# Patient Record
Sex: FEMALE | Race: WHITE | Employment: FULL TIME | URBAN - METROPOLITAN AREA
[De-identification: names, ages, dates, MRNs, and addresses within clinical notes are randomized per-mention and may not be internally consistent; named-entity substitution may affect disease eponyms.]

---

## 2022-04-11 ENCOUNTER — HOSPITAL ENCOUNTER (OUTPATIENT)
Age: 62
Setting detail: OBSERVATION
Discharge: HOME | End: 2022-04-12
Attending: EMERGENCY MEDICINE
Payer: COMMERCIAL

## 2022-04-11 ENCOUNTER — APPOINTMENT (OUTPATIENT)
Dept: CT IMAGING | Age: 62
End: 2022-04-11
Payer: COMMERCIAL

## 2022-04-11 DIAGNOSIS — K43.9 ABDOMINAL WALL HERNIA: ICD-10-CM

## 2022-04-11 DIAGNOSIS — R19.7 NAUSEA VOMITING AND DIARRHEA: Primary | ICD-10-CM

## 2022-04-11 DIAGNOSIS — R11.2 NAUSEA VOMITING AND DIARRHEA: Primary | ICD-10-CM

## 2022-04-11 DIAGNOSIS — E87.6 HYPOKALEMIA: ICD-10-CM

## 2022-04-11 LAB
-: ABNORMAL
ABSOLUTE EOS #: 0 K/UL (ref 0–0.4)
ABSOLUTE LYMPH #: 3.3 K/UL (ref 1–4.8)
ABSOLUTE MONO #: 1 K/UL (ref 0.1–1.2)
ALBUMIN SERPL-MCNC: 3.1 G/DL (ref 3.5–5.2)
ALBUMIN/GLOBULIN RATIO: 0.8 (ref 1–2.5)
ALP BLD-CCNC: 138 U/L (ref 35–104)
ALT SERPL-CCNC: 41 U/L (ref 5–33)
ANION GAP SERPL CALCULATED.3IONS-SCNC: 10 MMOL/L (ref 9–17)
AST SERPL-CCNC: 59 U/L
BACTERIA: ABNORMAL
BASOPHILS # BLD: 1 % (ref 0–2)
BASOPHILS ABSOLUTE: 0.1 K/UL (ref 0–0.2)
BILIRUB SERPL-MCNC: 1.62 MG/DL (ref 0.3–1.2)
BILIRUBIN DIRECT: 0.48 MG/DL
BILIRUBIN URINE: ABNORMAL
BILIRUBIN, INDIRECT: 1.14 MG/DL (ref 0–1)
BUN BLDV-MCNC: 14 MG/DL (ref 8–23)
CALCIUM SERPL-MCNC: 7.6 MG/DL (ref 8.6–10.4)
CASTS UA: ABNORMAL /LPF
CASTS UA: ABNORMAL /LPF
CHLORIDE BLD-SCNC: 90 MMOL/L (ref 98–107)
CO2: 30 MMOL/L (ref 20–31)
COLOR: ABNORMAL
CREAT SERPL-MCNC: 0.87 MG/DL (ref 0.5–0.9)
EOSINOPHILS RELATIVE PERCENT: 1 % (ref 1–4)
EPITHELIAL CELLS UA: ABNORMAL /HPF (ref 0–5)
GFR AFRICAN AMERICAN: >60 ML/MIN
GFR NON-AFRICAN AMERICAN: >60 ML/MIN
GFR SERPL CREATININE-BSD FRML MDRD: ABNORMAL ML/MIN/{1.73_M2}
GLUCOSE BLD-MCNC: 119 MG/DL (ref 70–99)
GLUCOSE URINE: NEGATIVE
HCT VFR BLD CALC: 50.5 % (ref 36–46)
HEMOGLOBIN: 16.9 G/DL (ref 12–16)
KETONES, URINE: ABNORMAL
LACTIC ACID: 2.4 MMOL/L (ref 0.5–2.2)
LEUKOCYTE ESTERASE, URINE: NEGATIVE
LIPASE: 20 U/L (ref 13–60)
LYMPHOCYTES # BLD: 42 % (ref 24–44)
MCH RBC QN AUTO: 30.7 PG (ref 26–34)
MCHC RBC AUTO-ENTMCNC: 33.5 G/DL (ref 31–37)
MCV RBC AUTO: 91.6 FL (ref 80–100)
MONOCYTES # BLD: 13 % (ref 2–11)
NITRITE, URINE: POSITIVE
OTHER OBSERVATIONS UA: ABNORMAL
PDW BLD-RTO: 16.4 % (ref 12.5–15.4)
PH UA: 6 (ref 5–8)
PLATELET # BLD: 215 K/UL (ref 140–450)
PMV BLD AUTO: 8 FL (ref 6–12)
POTASSIUM SERPL-SCNC: 3 MMOL/L (ref 3.7–5.3)
PROTEIN UA: ABNORMAL
RBC # BLD: 5.51 M/UL (ref 4–5.2)
RBC UA: ABNORMAL /HPF (ref 0–2)
SARS-COV-2, RAPID: NOT DETECTED
SEG NEUTROPHILS: 43 % (ref 36–66)
SEGMENTED NEUTROPHILS ABSOLUTE COUNT: 3.3 K/UL (ref 1.8–7.7)
SODIUM BLD-SCNC: 130 MMOL/L (ref 135–144)
SPECIFIC GRAVITY UA: 1.03 (ref 1–1.03)
SPECIMEN DESCRIPTION: NORMAL
TOTAL PROTEIN: 6.9 G/DL (ref 6.4–8.3)
TROPONIN, HIGH SENSITIVITY: 6 NG/L (ref 0–14)
TURBIDITY: CLEAR
URINE HGB: NEGATIVE
UROBILINOGEN, URINE: NORMAL
WBC # BLD: 7.8 K/UL (ref 3.5–11)
WBC UA: ABNORMAL /HPF (ref 0–5)

## 2022-04-11 PROCEDURE — 2580000003 HC RX 258: Performed by: EMERGENCY MEDICINE

## 2022-04-11 PROCEDURE — 83605 ASSAY OF LACTIC ACID: CPT

## 2022-04-11 PROCEDURE — G0378 HOSPITAL OBSERVATION PER HR: HCPCS

## 2022-04-11 PROCEDURE — 6370000000 HC RX 637 (ALT 250 FOR IP): Performed by: STUDENT IN AN ORGANIZED HEALTH CARE EDUCATION/TRAINING PROGRAM

## 2022-04-11 PROCEDURE — 6360000002 HC RX W HCPCS: Performed by: STUDENT IN AN ORGANIZED HEALTH CARE EDUCATION/TRAINING PROGRAM

## 2022-04-11 PROCEDURE — 93005 ELECTROCARDIOGRAM TRACING: CPT | Performed by: STUDENT IN AN ORGANIZED HEALTH CARE EDUCATION/TRAINING PROGRAM

## 2022-04-11 PROCEDURE — 6360000004 HC RX CONTRAST MEDICATION: Performed by: EMERGENCY MEDICINE

## 2022-04-11 PROCEDURE — 83690 ASSAY OF LIPASE: CPT

## 2022-04-11 PROCEDURE — 81001 URINALYSIS AUTO W/SCOPE: CPT

## 2022-04-11 PROCEDURE — 87635 SARS-COV-2 COVID-19 AMP PRB: CPT

## 2022-04-11 PROCEDURE — 96374 THER/PROPH/DIAG INJ IV PUSH: CPT

## 2022-04-11 PROCEDURE — 74177 CT ABD & PELVIS W/CONTRAST: CPT

## 2022-04-11 PROCEDURE — 85025 COMPLETE CBC W/AUTO DIFF WBC: CPT

## 2022-04-11 PROCEDURE — 96372 THER/PROPH/DIAG INJ SC/IM: CPT

## 2022-04-11 PROCEDURE — 99219 PR INITIAL OBSERVATION CARE/DAY 50 MINUTES: CPT | Performed by: STUDENT IN AN ORGANIZED HEALTH CARE EDUCATION/TRAINING PROGRAM

## 2022-04-11 PROCEDURE — 6370000000 HC RX 637 (ALT 250 FOR IP): Performed by: EMERGENCY MEDICINE

## 2022-04-11 PROCEDURE — 96365 THER/PROPH/DIAG IV INF INIT: CPT

## 2022-04-11 PROCEDURE — 96375 TX/PRO/DX INJ NEW DRUG ADDON: CPT

## 2022-04-11 PROCEDURE — 6360000002 HC RX W HCPCS: Performed by: EMERGENCY MEDICINE

## 2022-04-11 PROCEDURE — 84484 ASSAY OF TROPONIN QUANT: CPT

## 2022-04-11 PROCEDURE — 2580000003 HC RX 258: Performed by: STUDENT IN AN ORGANIZED HEALTH CARE EDUCATION/TRAINING PROGRAM

## 2022-04-11 PROCEDURE — 80076 HEPATIC FUNCTION PANEL: CPT

## 2022-04-11 PROCEDURE — 99284 EMERGENCY DEPT VISIT MOD MDM: CPT

## 2022-04-11 PROCEDURE — 80048 BASIC METABOLIC PNL TOTAL CA: CPT

## 2022-04-11 PROCEDURE — 36415 COLL VENOUS BLD VENIPUNCTURE: CPT

## 2022-04-11 RX ORDER — HYDROCHLOROTHIAZIDE 12.5 MG/1
TABLET ORAL
COMMUNITY
Start: 2022-01-14

## 2022-04-11 RX ORDER — CITALOPRAM 20 MG/1
10 TABLET ORAL DAILY
Status: DISCONTINUED | OUTPATIENT
Start: 2022-04-11 | End: 2022-04-12 | Stop reason: HOSPADM

## 2022-04-11 RX ORDER — 0.9 % SODIUM CHLORIDE 0.9 %
1000 INTRAVENOUS SOLUTION INTRAVENOUS ONCE
Status: COMPLETED | OUTPATIENT
Start: 2022-04-11 | End: 2022-04-11

## 2022-04-11 RX ORDER — ACETAMINOPHEN 650 MG/1
650 SUPPOSITORY RECTAL EVERY 6 HOURS PRN
Status: DISCONTINUED | OUTPATIENT
Start: 2022-04-11 | End: 2022-04-12 | Stop reason: HOSPADM

## 2022-04-11 RX ORDER — LISINOPRIL 10 MG/1
40 TABLET ORAL
COMMUNITY
Start: 2022-02-14

## 2022-04-11 RX ORDER — ONDANSETRON 4 MG/1
4 TABLET, FILM COATED ORAL EVERY 8 HOURS PRN
Qty: 20 TABLET | Refills: 0 | Status: SHIPPED | OUTPATIENT
Start: 2022-04-11 | End: 2022-05-09 | Stop reason: ALTCHOICE

## 2022-04-11 RX ORDER — ONDANSETRON 4 MG/1
4 TABLET, ORALLY DISINTEGRATING ORAL EVERY 8 HOURS PRN
Status: DISCONTINUED | OUTPATIENT
Start: 2022-04-11 | End: 2022-04-12 | Stop reason: HOSPADM

## 2022-04-11 RX ORDER — SODIUM CHLORIDE 0.9 % (FLUSH) 0.9 %
5-40 SYRINGE (ML) INJECTION EVERY 12 HOURS SCHEDULED
Status: DISCONTINUED | OUTPATIENT
Start: 2022-04-11 | End: 2022-04-12 | Stop reason: HOSPADM

## 2022-04-11 RX ORDER — 0.9 % SODIUM CHLORIDE 0.9 %
80 INTRAVENOUS SOLUTION INTRAVENOUS ONCE
Status: DISCONTINUED | OUTPATIENT
Start: 2022-04-11 | End: 2022-04-12 | Stop reason: HOSPADM

## 2022-04-11 RX ORDER — SODIUM CHLORIDE 9 MG/ML
INJECTION, SOLUTION INTRAVENOUS PRN
Status: DISCONTINUED | OUTPATIENT
Start: 2022-04-11 | End: 2022-04-12 | Stop reason: HOSPADM

## 2022-04-11 RX ORDER — SODIUM CHLORIDE 0.9 % (FLUSH) 0.9 %
5-40 SYRINGE (ML) INJECTION PRN
Status: DISCONTINUED | OUTPATIENT
Start: 2022-04-11 | End: 2022-04-12 | Stop reason: HOSPADM

## 2022-04-11 RX ORDER — LISINOPRIL 10 MG/1
10 TABLET ORAL DAILY
Status: DISCONTINUED | OUTPATIENT
Start: 2022-04-11 | End: 2022-04-12 | Stop reason: HOSPADM

## 2022-04-11 RX ORDER — SODIUM CHLORIDE 9 MG/ML
INJECTION, SOLUTION INTRAVENOUS CONTINUOUS
Status: DISCONTINUED | OUTPATIENT
Start: 2022-04-11 | End: 2022-04-12 | Stop reason: HOSPADM

## 2022-04-11 RX ORDER — ONDANSETRON 2 MG/ML
4 INJECTION INTRAMUSCULAR; INTRAVENOUS ONCE
Status: COMPLETED | OUTPATIENT
Start: 2022-04-11 | End: 2022-04-11

## 2022-04-11 RX ORDER — POLYETHYLENE GLYCOL 3350 17 G/17G
17 POWDER, FOR SOLUTION ORAL DAILY PRN
Status: DISCONTINUED | OUTPATIENT
Start: 2022-04-11 | End: 2022-04-12 | Stop reason: HOSPADM

## 2022-04-11 RX ORDER — CITALOPRAM 10 MG/1
TABLET ORAL
COMMUNITY
Start: 2021-09-14

## 2022-04-11 RX ORDER — LOSARTAN POTASSIUM 50 MG/1
50 TABLET ORAL DAILY
Status: ON HOLD | COMMUNITY
End: 2022-04-11

## 2022-04-11 RX ORDER — CEPHALEXIN 500 MG/1
500 CAPSULE ORAL 3 TIMES DAILY
Qty: 21 CAPSULE | Refills: 0 | Status: SHIPPED | OUTPATIENT
Start: 2022-04-11 | End: 2022-04-18

## 2022-04-11 RX ORDER — POTASSIUM CHLORIDE 20 MEQ/1
40 TABLET, EXTENDED RELEASE ORAL ONCE
Status: COMPLETED | OUTPATIENT
Start: 2022-04-11 | End: 2022-04-11

## 2022-04-11 RX ORDER — SODIUM CHLORIDE 0.9 % (FLUSH) 0.9 %
10 SYRINGE (ML) INJECTION PRN
Status: DISCONTINUED | OUTPATIENT
Start: 2022-04-11 | End: 2022-04-12 | Stop reason: HOSPADM

## 2022-04-11 RX ORDER — ACETAMINOPHEN 325 MG/1
650 TABLET ORAL EVERY 6 HOURS PRN
Status: DISCONTINUED | OUTPATIENT
Start: 2022-04-11 | End: 2022-04-12 | Stop reason: HOSPADM

## 2022-04-11 RX ORDER — HYDROCHLOROTHIAZIDE 25 MG/1
12.5 TABLET ORAL DAILY
Status: DISCONTINUED | OUTPATIENT
Start: 2022-04-11 | End: 2022-04-12 | Stop reason: HOSPADM

## 2022-04-11 RX ORDER — ONDANSETRON 2 MG/ML
4 INJECTION INTRAMUSCULAR; INTRAVENOUS EVERY 6 HOURS PRN
Status: DISCONTINUED | OUTPATIENT
Start: 2022-04-11 | End: 2022-04-12 | Stop reason: HOSPADM

## 2022-04-11 RX ADMIN — SODIUM CHLORIDE: 9 INJECTION, SOLUTION INTRAVENOUS at 11:20

## 2022-04-11 RX ADMIN — CITALOPRAM HYDROBROMIDE 10 MG: 20 TABLET, FILM COATED ORAL at 11:17

## 2022-04-11 RX ADMIN — POTASSIUM CHLORIDE 40 MEQ: 20 TABLET, EXTENDED RELEASE ORAL at 09:40

## 2022-04-11 RX ADMIN — HYDROCHLOROTHIAZIDE 12.5 MG: 25 TABLET ORAL at 11:17

## 2022-04-11 RX ADMIN — CEFTRIAXONE SODIUM 1000 MG: 1 INJECTION, POWDER, FOR SOLUTION INTRAMUSCULAR; INTRAVENOUS at 09:44

## 2022-04-11 RX ADMIN — IOPAMIDOL 100 ML: 755 INJECTION, SOLUTION INTRAVENOUS at 08:24

## 2022-04-11 RX ADMIN — LISINOPRIL 10 MG: 10 TABLET ORAL at 11:18

## 2022-04-11 RX ADMIN — SODIUM CHLORIDE, PRESERVATIVE FREE 10 ML: 5 INJECTION INTRAVENOUS at 11:21

## 2022-04-11 RX ADMIN — ONDANSETRON 4 MG: 2 INJECTION INTRAMUSCULAR; INTRAVENOUS at 07:45

## 2022-04-11 RX ADMIN — Medication 80 ML: at 08:26

## 2022-04-11 RX ADMIN — ACETAMINOPHEN 650 MG: 325 TABLET ORAL at 11:17

## 2022-04-11 RX ADMIN — ENOXAPARIN SODIUM 30 MG: 100 INJECTION SUBCUTANEOUS at 20:34

## 2022-04-11 RX ADMIN — ACETAMINOPHEN 650 MG: 325 TABLET ORAL at 20:31

## 2022-04-11 RX ADMIN — SODIUM CHLORIDE 1000 ML: 9 INJECTION, SOLUTION INTRAVENOUS at 07:44

## 2022-04-11 RX ADMIN — SODIUM CHLORIDE, PRESERVATIVE FREE 10 ML: 5 INJECTION INTRAVENOUS at 08:25

## 2022-04-11 RX ADMIN — SODIUM CHLORIDE, PRESERVATIVE FREE 10 ML: 5 INJECTION INTRAVENOUS at 22:52

## 2022-04-11 ASSESSMENT — PAIN DESCRIPTION - LOCATION
LOCATION: ABDOMEN

## 2022-04-11 ASSESSMENT — PAIN DESCRIPTION - ONSET: ONSET: ON-GOING

## 2022-04-11 ASSESSMENT — PAIN SCALES - GENERAL
PAINLEVEL_OUTOF10: 0
PAINLEVEL_OUTOF10: 3
PAINLEVEL_OUTOF10: 5
PAINLEVEL_OUTOF10: 7
PAINLEVEL_OUTOF10: 3
PAINLEVEL_OUTOF10: 7

## 2022-04-11 ASSESSMENT — ENCOUNTER SYMPTOMS
DIARRHEA: 1
ABDOMINAL PAIN: 1
VOMITING: 1
NAUSEA: 1

## 2022-04-11 ASSESSMENT — PAIN DESCRIPTION - PAIN TYPE
TYPE: ACUTE PAIN

## 2022-04-11 ASSESSMENT — PAIN - FUNCTIONAL ASSESSMENT
PAIN_FUNCTIONAL_ASSESSMENT: ACTIVITIES ARE NOT PREVENTED
PAIN_FUNCTIONAL_ASSESSMENT: 0-10

## 2022-04-11 ASSESSMENT — PAIN DESCRIPTION - DESCRIPTORS
DESCRIPTORS: DISCOMFORT
DESCRIPTORS: DISCOMFORT
DESCRIPTORS: ACHING;DISCOMFORT

## 2022-04-11 ASSESSMENT — PAIN DESCRIPTION - FREQUENCY: FREQUENCY: INTERMITTENT

## 2022-04-11 ASSESSMENT — PAIN DESCRIPTION - PROGRESSION: CLINICAL_PROGRESSION: NOT CHANGED

## 2022-04-11 NOTE — H&P
[facility]  Office: [address], DO, Sherrie Garza, DO, Mary Ellen Lewis, DO, Fransisco Peralta Blood, DO, Emilie Sheth MD, Shira Luis MD, Michael Reyes MD, Jerad Beck MD, Pushpa Mccarthy MD, Hernando Ceballos MD, Romana Ramos MD, Winston Serra, DO, Trung Collado, DO, Juliane Ascencio MD,  Bruce Schroeder, DO, Berto Heredia MD, Eri Gale MD, Hope Opitz, MD, Mercedes Morales, DO, Harshil Pierre MD, Tenzin Matamoros MD, Abby Abdalla, [facility], [facility], CNP, Anneliese Abraham, CNP, Dennis Benavidez, CNP, James Garcia, CNP, Marianna Warren, CNP, Nette Negron PARebeccaC, Lindsey Colon, [facility], Demetrio Birmingham, CNP, Meche Lucas, CNP, Juanis Abel, CNP, Gilmar Coles, CNS, Katarzyna Cha, [facility], Vianey Parry, CNP, Laura Connolly, CNP, Royal Pickering, [address]    HISTORY AND PHYSICAL EXAMINATION            Date:   4/11/2022  Patient name:  Bhavna Cuadra  Date of admission:  4/11/2022  7:08 AM  MRN:   [id number]  Account:  [de-identified]  YOB: 1960  PCP:    CONCHIS Vargas CNP  Room:   ER11/ER11  Code Status:    No Order    Chief Complaint:     Chief Complaint   Patient presents with    Fever     since wednesday, denies sick contacts    Nausea & Vomiting    Diarrhea    Abdominal Pain     discomfort, hx of diverticulitis     History Obtained From:     patient    History of Present Illness:     Bhavna Cuadra is a 62 y.o. female with a past medical history of depression, hypertension and hyperlipidemia who presented to the emergency department on 4/11/2022 complaining of intractable diarrhea, nausea/vomiting and abdominal pain. The patient states that her symptoms began last Wednesday and have remained stable. She states that she has had very little to eat or drink since then due to persistent nausea. In the ED, the patient was afebrile and nontoxic appearing. Lipase was within normal limits.  Hepatic function panel was remarkable for a mild transaminitis (ALT 41, AST 59). CT scan of the abdomen and pelvis was done and was negative for an acute intraabdominal finding. The patient is admitted to internal medicine for further management of intractable nausea, vomiting and diarrhea likely secondary to gastroenteritis. Past Medical History:     Past Medical History:   Diagnosis Date    Anemia 10/23/2013    History of depression     History of spontaneous      Hyperlipidemia     Hypertension     Vision abnormalities         Past Surgical History:     Past Surgical History:   Procedure Laterality Date    CHOLECYSTECTOMY      COLONOSCOPY      [facility]    HERNIA REPAIR Right 1987        Medications Prior to Admission:     Prior to Admission medications    Medication Sig Start Date End Date Taking? Authorizing Provider   citalopram (CELEXA) 10 MG tablet TAKE 1 TABLET BY MOUTH EVERY DAY 21  Yes Historical Provider, MD   lisinopril (PRINIVIL;ZESTRIL) 10 MG tablet TAKE 1 TABLET BY MOUTH EVERY DAY 22  Yes Historical Provider, MD   hydroCHLOROthiazide (HYDRODIURIL) 12.5 MG tablet TAKE 1 TABLET BY MOUTH EVERY DAY 22  Yes Historical Provider, MD   ondansetron (ZOFRAN) 4 MG tablet Take 1 tablet by mouth every 8 hours as needed for Nausea 22  Yes Katherin Santiago MD   cephALEXin (KEFLEX) 500 MG capsule Take 1 capsule by mouth 3 times daily for 7 days 22 Yes Katherin Santiago MD   losartan-hydrochlorothiazide [facility]) 50-12.5 MG per tablet TAKE 1 TABLET DAILY 16   Liane Wheatley MD        Allergies:     Codeine    Social History:     Tobacco:    reports that she has never smoked. She has never used smokeless tobacco.  Alcohol:      reports current alcohol use. Drug Use:  reports no history of drug use.     Family History:     Family History   Problem Relation Age of Onset    Heart Disease Father     Hypertension Mother     Heart Disease Mother     Breast Cancer Neg Hx     Cancer Neg Hx     Colon Cancer Neg Hx     Diabetes Neg Hx     Eclampsia Neg Hx     Ovarian Cancer Neg Hx      Labor Neg Hx     Spont Abortions Neg Hx     Stroke Neg Hx        Review of Systems:     Positive and Negative as described in HPI. CONSTITUTIONAL: Positive for fever and chills. HEENT:  negative for vision, hearing changes, runny nose, throat pain  RESPIRATORY:  negative for shortness of breath, cough, congestion, wheezing  CARDIOVASCULAR:  negative for chest pain, palpitations  GASTROINTESTINAL: Positive for abdominal pain, nausea/vomiting and diarrhea. GENITOURINARY:  negative for difficulty of urination, burning with urination, frequency   INTEGUMENT:  negative for rash, skin lesions, easy bruising   HEMATOLOGIC/LYMPHATIC:  negative for swelling/edema   ALLERGIC/IMMUNOLOGIC:  negative for urticaria , itching  ENDOCRINE:  negative increase in drinking, increase in urination, hot or cold intolerance  MUSCULOSKELETAL:  negative joint pains, muscle aches, swelling of joints  NEUROLOGICAL:  negative for headaches, dizziness, lightheadedness, numbness, pain, tingling extremities  BEHAVIOR/PSYCH:  negative for depression, anxiety    Physical Exam:   BP (!) 152/78   Pulse 97   Temp 98 °F (36.7 °C) (Oral)   Resp 14   Ht 5' 4\" (1.626 m)   Wt 250 lb (113.4 kg)   LMP 10/01/2013   SpO2 94%   BMI 42.91 kg/m²   Temp (24hrs), Av °F (36.7 °C), Min:98 °F (36.7 °C), Max:98 °F (36.7 °C)    No results for input(s): POCGLU in the last 72 hours.   No intake or output data in the 24 hours ending 22 1001    General Appearance:  alert, well appearing, and in no acute distress  Mental status: oriented to person, place, and time  Head:  normocephalic, atraumatic  Eye: no icterus, redness, pupils equal and reactive, extraocular eye movements intact, conjunctiva clear  Ear: normal external ear, no discharge, hearing intact  Nose:  no drainage noted  Mouth: mucous membranes moist  Neck: supple, no carotid bruits, thyroid not palpable  Lungs: Bilateral equal air entry, clear to ausculation, no wheezing, rales or rhonchi, normal effort  Cardiovascular: normal rate, regular rhythm, no murmur, gallop, rub  Abdomen: Diffuse tenderness to palpation appreciated.    Neurologic: There are no new focal motor or sensory deficits, normal muscle tone and bulk, no abnormal sensation, normal speech, cranial nerves II through XII grossly intact  Skin: No gross lesions, rashes, bruising or bleeding on exposed skin area  Extremities:  peripheral pulses palpable, no pedal edema or calf pain with palpation  Psych: normal affect     Investigations:      Laboratory Testing:  Recent Results (from the past 24 hour(s))   Basic Metabolic Panel    Collection Time: 04/11/22  7:45 AM   Result Value Ref Range    Glucose 119 (H) 70 - 99 mg/dL    BUN 14 8 - 23 mg/dL    CREATININE 0.87 0.50 - 0.90 mg/dL    Calcium 7.6 (L) 8.6 - 10.4 mg/dL    Sodium 130 (L) 135 - 144 mmol/L    Potassium 3.0 (L) 3.7 - 5.3 mmol/L    Chloride 90 (L) 98 - 107 mmol/L    CO2 30 20 - 31 mmol/L    Anion Gap 10 9 - 17 mmol/L    GFR Non-African American >60 >60 mL/min    GFR African American >60 >60 mL/min    GFR Comment         CBC with Auto Differential    Collection Time: 04/11/22  7:45 AM   Result Value Ref Range    WBC 7.8 3.5 - 11.0 k/uL    RBC 5.51 (H) 4.0 - 5.2 m/uL    Hemoglobin 16.9 (H) 12.0 - 16.0 g/dL    Hematocrit 50.5 (H) 36 - 46 %    MCV 91.6 80 - 100 fL    MCH 30.7 26 - 34 pg    MCHC 33.5 31 - 37 g/dL    RDW 16.4 (H) 12.5 - 15.4 %    Platelets 280 093 - 190 k/uL    MPV 8.0 6.0 - 12.0 fL    Seg Neutrophils 43 36 - 66 %    Lymphocytes 42 24 - 44 %    Monocytes 13 (H) 2 - 11 %    Eosinophils % 1 1 - 4 %    Basophils 1 0 - 2 %    Segs Absolute 3.30 1.8 - 7.7 k/uL    Absolute Lymph # 3.30 1.0 - 4.8 k/uL    Absolute Mono # 1.00 0.1 - 1.2 k/uL    Absolute Eos # 0.00 0.0 - 0.4 k/uL    Basophils Absolute 0.10 0.0 - 0.2 k/uL   Lactic Acid    Collection Time: 04/11/22  7:45 AM   Result Value Ref Range    Lactic Acid 2.4 (H) 0.5 - 2.2 mmol/L   Hepatic Function Panel    Collection Time: 04/11/22  7:45 AM   Result Value Ref Range    Albumin 3.1 (L) 3.5 - 5.2 g/dL    Alkaline Phosphatase 138 (H) 35 - 104 U/L    ALT 41 (H) 5 - 33 U/L    AST 59 (H) <32 U/L    Total Bilirubin 1.62 (H) 0.3 - 1.2 mg/dL    Bilirubin, Direct 0.48 (H) <0.31 mg/dL    Bilirubin, Indirect 1.14 (H) 0.00 - 1.00 mg/dL    Total Protein 6.9 6.4 - 8.3 g/dL    Albumin/Globulin Ratio 0.8 (L) 1.0 - 2.5   Lipase    Collection Time: 04/11/22  7:45 AM   Result Value Ref Range    Lipase 20 13 - 60 U/L   Troponin    Collection Time: 04/11/22  7:45 AM   Result Value Ref Range    Troponin, High Sensitivity 6 0 - 14 ng/L   Urinalysis with Reflex to Culture    Collection Time: 04/11/22  8:19 AM   Result Value Ref Range    Color, UA Dark Yellow (A) Yellow    Turbidity UA Clear Clear    Glucose, Ur NEGATIVE NEGATIVE    Bilirubin Urine NEGATIVE  Verified by ictotest. (A) NEGATIVE    Ketones, Urine TRACE (A) NEGATIVE    Specific Gravity, UA 1.034 (H) 1.005 - 1.030    Urine Hgb NEGATIVE NEGATIVE    pH, UA 6.0 5.0 - 8.0    Protein, UA 1+ (A) NEGATIVE    Urobilinogen, Urine Normal Normal    Nitrite, Urine POSITIVE (A) NEGATIVE    Leukocyte Esterase, Urine NEGATIVE NEGATIVE   Microscopic Urinalysis    Collection Time: 04/11/22  8:19 AM   Result Value Ref Range    -          WBC, UA 2 TO 5 0 - 5 /HPF    RBC, UA 0 TO 2 0 - 2 /HPF    Casts UA 0 TO 2 /LPF    Casts UA HYALINE /LPF    Epithelial Cells UA 2 TO 5 0 - 5 /HPF    Bacteria, UA FEW (A) None    Other Observations UA (A) NOT REQ. Utilizing a urinalysis as the only screening method to exclude a potential uropathogen can be unreliable in many patient populations. Rapid screening tests are less sensitive than culture and if UTI is a clinical possibility, culture should be considered despite a negative urinalysis.    COVID-19, Rapid    Collection Time: 04/11/22  9:30 AM Specimen: Nasopharyngeal Swab   Result Value Ref Range    Specimen Description . NASOPHARYNGEAL SWAB     SARS-CoV-2, Rapid Not Detected Not Detected       Imaging/Diagnostics:  CT ABDOMEN PELVIS W IV CONTRAST Additional Contrast? None    Result Date: 4/11/2022  Diastasis of the abdominal structures muscle with fat containing midline hernias throughout a majority of the span of the abdomen. A hernia inferiorly contains fat and small bowel loops. Uncomplicated colonic diverticulosis. Assessment :      Hospital Problems           Last Modified POA    * (Principal) Intractable vomiting with nausea 4/11/2022 Yes    Hypertension 4/11/2022 Yes    History of depression 4/11/2022 Yes          Plan:     Patient status observation in the Med/Surge    Intractable nausea and vomiting   -Likely secondary to viral gastroenteritis   -GI pathogen panel pending   -Maintenance fluids at 125 mL/hr   -Diet clear liquid   -Daily CBC   -Daily BMP   -PRN ondansetron   -Anticipate discharge home within the next 24-hours if the patient improves     Hypertension   -Continue home lisinopril 10 mg daily   -Continue home hydrochlorothiazide    Depression   -Continue home citalopram 10 mg daily      Consultations:   None    Patient is admitted as inpatient status because of co-morbidities listed above, severity of signs and symptoms as outlined, requirement for current medical therapies and most importantly because of direct risk to patient if care not provided in a hospital setting. Expected length of stay > 48 hours.     Lupe Rodriguez MD  4/11/2022  10:01 AM    Copy sent to CONCHIS Carlos - CNP

## 2022-04-11 NOTE — LETTER
[address] [facility] ICU  [address] [facility] [address]  Phone: [phone]        April 12, 2022     Patient: Terese Olszewski   YOB: 1960   Date of Visit: 4/11/2022       To Whom It May Concern: It is my medical opinion that Diane Hooker may return to work Friday 4/15/2022. If you have any questions or concerns, please don't hesitate to call.     Sincerely,        Dr Willard Pearson

## 2022-04-11 NOTE — PROGRESS NOTES
[facility]up[facility] [address] [employer]  Occupational Therapy Not Seen Note    DATE: 2022    NAME: Rafael Baker  MRN: [id number]   : 1960      Patient not seen this date for Occupational Therapy due to:      Patient independent with ADLs and functional tasks with no acute OT needs. Pt demo ability to independently don socks while seated EOB and perform functional mobility within hospital room/hallway independently. Pt reports no safety concerns for returning home at discharge or with performing ADL tasks at this time; pt states only \"feeling tired\" due to lack of tolerating nutrients for ~1 week. Will defer OT evaluation at this time. Please reorder OT if future needs arise. Pt educated in activity promotion while hospitalized and verbalized understanding.          Electronically signed by Josiane Zhang OT on 2022 at 11:50 AM

## 2022-04-11 NOTE — PROGRESS NOTES
Physical Therapy         Physical Therapy Cancel Note      DATE: 2022    NAME: Jackie Oliver  MRN: [id number]   : 1960      Patient not seen this date for Physical Therapy due to:    Patient independent with functional mobility. Will defer PT evaluation at this time. Please reorder PT if future needs arise. Pt ambulated without device Independently without LOB or difficulty. No PT needs at this time.       Electronically signed by Butch Peoples PT on 2022 at 11:50 AM

## 2022-04-11 NOTE — PROGRESS NOTES
Pharmacist Review and Automatic Dose Adjustment of Prophylactic Enoxaparin      The reviewing pharmacist has made an adjustment to the ordered enoxaparin dose or converted to UFH per the approved [facility] protocol and table as identified below. Gautam Meyers is a 62 y.o. female. Recent Labs     04/11/22  0745   CREATININE 0.87       Estimated Creatinine Clearance: 85 mL/min (based on SCr of 0.87 mg/dL). Height:   Ht Readings from Last 1 Encounters:   04/11/22 5' 4\" (1.626 m)     Weight:  Wt Readings from Last 1 Encounters:   04/11/22 250 lb (113.4 kg)               Plan: Based upon the patient's weight and renal function, the enoxaparin dose has been changed/converted to Lovenox 30 mg BID.       Thank you,  Geoffrey Diallo [id number], [facility]  4/11/2022, 10:38 AM

## 2022-04-11 NOTE — ED PROVIDER NOTES
[id number] [facility] ED  [address] [facility] [address]  Phone: [phone]  Fax: [phone]        Pt Name: Yoel Henson  MRN: [id number]  [place] 1960  Date of evaluation: 22      CHIEF COMPLAINT     Chief Complaint   Patient presents with    Fever     since wednesday, denies sick contacts    Nausea & Vomiting    Diarrhea    Abdominal Pain     discomfort, hx of diverticulitis         HISTORY OF PRESENT ILLNESS  (Location/Symptom, Timing/Onset, Context/Setting, Quality, Duration, Modifying Factors, Severity.)    Yoel Henson is a 62 y.o. female who presents with fever chills nausea vomiting diarrhea. The patient dates on Wednesday she started developing nausea vomiting diarrhea fever chills some abdominal cramping she had one episode where she thought there might be some blood in her stool she does have a history of diverticular disease denies any bad food exposure nobody around her with similar symptoms no chest pain no shortness of breath she is able to eat small amounts of food but then will have either vomiting or diarrhea if she eats too much      REVIEW OF SYSTEMS    (2-9 systems for level 4, 10 or more for level 5)     Review of Systems   Constitutional: Positive for chills and fever. Gastrointestinal: Positive for abdominal pain, diarrhea, nausea and vomiting. All other systems reviewed and are negative. PAST MEDICAL HISTORY    has a past medical history of Anemia, History of depression, History of spontaneous , Hyperlipidemia, Hypertension, and Vision abnormalities. SURGICAL HISTORY      has a past surgical history that includes Cholecystectomy (); hernia repair (Right, ); and Colonoscopy.     CURRENTMEDICATIONS       Previous Medications    CITALOPRAM (CELEXA) 10 MG TABLET    TAKE 1 TABLET BY MOUTH EVERY DAY    HYDROCHLOROTHIAZIDE (HYDRODIURIL) 12.5 MG TABLET    TAKE 1 TABLET BY MOUTH EVERY DAY    LISINOPRIL (PRINIVIL;ZESTRIL) 10 MG TABLET    TAKE 1 TABLET BY MOUTH EVERY DAY    LOSARTAN-HYDROCHLOROTHIAZIDE (HYZAAR) 50-12.5 MG PER TABLET    TAKE 1 TABLET DAILY       ALLERGIES     is allergic to codeine. FAMILY HISTORY     She indicated that her mother is alive. She indicated that her father is . She indicated that the status of her neg hx is unknown.     family history includes Heart Disease in her father and mother; Hypertension in her mother. SOCIAL HISTORY      reports that she has never smoked. She has never used smokeless tobacco. She reports current alcohol use. She reports that she does not use drugs. PHYSICAL EXAM    (up to 7 for level 4, 8 or more for level 5)   INITIAL VITALS:  height is 5' 4\" (1.626 m) and weight is 113.4 kg (250 lb). Her oral temperature is 98 °F (36.7 °C). Her blood pressure is 152/78 (abnormal) and her pulse is 97. Her respiration is 14 and oxygen saturation is 94%. Physical Exam  Vitals and nursing note reviewed. Constitutional:       Appearance: Normal appearance. HENT:      Head: Normocephalic and atraumatic. Eyes:      Conjunctiva/sclera: Conjunctivae normal.   Cardiovascular:      Rate and Rhythm: Regular rhythm. Tachycardia present. Pulmonary:      Effort: Pulmonary effort is normal.      Breath sounds: Normal breath sounds. Abdominal:      General: Bowel sounds are normal. There is no distension. Palpations: Abdomen is soft. There is no mass. Tenderness: There is abdominal tenderness. There is no right CVA tenderness, left CVA tenderness, guarding or rebound. Comments: Mild tenderness in the periumbilical region no other abdominal tenderness no overt peritoneal findings   Musculoskeletal:         General: Normal range of motion. Cervical back: Normal range of motion and neck supple. Skin:     General: Skin is warm and dry. Neurological:      General: No focal deficit present. Mental Status: She is alert. DIFFERENTIAL DIAGNOSIS/ MDM:     We will establish an IV give the patient IV fluids check labs UA EKG and a CT of the abdomen and pelvis    DIAGNOSTIC RESULTS     EKG: All EKG's are interpreted by the Emergency Department Physician who either signs or Co-signs this chart in the absence of a cardiologist.      Interpreted by Clovis Nogueira MD     Rhythm: normal sinus   Rate: 99  Axis: -18  Ectopy: none  Conduction: normal  ST Segments: no acute change  T Waves: no acute change  Q Waves: none    Clinical Impression: normal sinus rhythm with no acute changes/normal EKG. No acute infarction/ischemia noted. RADIOLOGY:        Interpretation per the Radiologist below, if available at the time of this note:    CT ABDOMEN PELVIS W IV CONTRAST Additional Contrast? None (Final result)  Result time 04/11/22 09:08:38  Final result by Ephriam Severe, MD (04/11/22 09:08:38)                Impression:    Diastasis of the abdominal structures muscle with fat containing midline   hernias throughout a majority of the span of the abdomen.  A hernia   inferiorly contains fat and small bowel loops. Uncomplicated colonic diverticulosis. Narrative:    EXAMINATION:   CT OF THE ABDOMEN AND PELVIS WITH CONTRAST 4/11/2022 8:22 am     TECHNIQUE:   CT of the abdomen and pelvis was performed with the administration of   intravenous contrast. Multiplanar reformatted images are provided for review. Dose modulation, iterative reconstruction, and/or weight based adjustment of   the mA/kV was utilized to reduce the radiation dose to as low as reasonably   achievable. COMPARISON:   CT abdomen and pelvis performed 07/26/2016.      HISTORY:   ORDERING SYSTEM PROVIDED HISTORY: pain   TECHNOLOGIST PROVIDED HISTORY:     pain   Decision Support Exception - unselect if not a suspected or confirmed   emergency medical condition->Emergency Medical Condition (MA)   Reason for Exam: Fever; Nausea & Vomiting; Diarrhea; Abdominal Pain     FINDINGS:   Lower Chest: The lung bases are without consolidation or effusion.  The   visualized cardiac structures are unremarkable. Organs: The liver and spleen are normal size and overall attenuation.  There   are splenic granulomas.  The gallbladder has been removed.  Pancreas and   adrenal glands are unremarkable. Harvey Patee are without obstructive uropathy. The ureters are not dilated.  The urinary bladder is contracted. GI/Bowel: The stomach is contracted and otherwise unremarkable.  Loops of   small bowel are normal in caliber without evidence for obstruction.  The   colon contains air and fecal residue. Jackie Cheeks are uncomplicated diverticula. There is no free air or free fluid. Pelvis: The uterus is age-appropriate. Peritoneum/Retroperitoneum: The psoas muscles are symmetric.  The abdominal   aorta is normal in caliber.  The inferior vena cava is unremarkable. Jackie Cheeks   is no retroperitoneal or mesenteric adenopathy. Bones/Soft Tissues: There is diastasis of the abdominus rectus muscle with   herniation of intra-abdominal fat in the midline throughout a majority of the   abdomen.  There is a midline hernia inferiorly containing abdominal fat and   small bowel loops.  There is no acute osseous abnormality.                    LABS:  Results for orders placed or performed during the hospital encounter of 35/97/03   Basic Metabolic Panel   Result Value Ref Range    Glucose 119 (H) 70 - 99 mg/dL    BUN 14 8 - 23 mg/dL    CREATININE 0.87 0.50 - 0.90 mg/dL    Calcium 7.6 (L) 8.6 - 10.4 mg/dL    Sodium 130 (L) 135 - 144 mmol/L    Potassium 3.0 (L) 3.7 - 5.3 mmol/L    Chloride 90 (L) 98 - 107 mmol/L    CO2 30 20 - 31 mmol/L    Anion Gap 10 9 - 17 mmol/L    GFR Non-African American >60 >60 mL/min    GFR African American >60 >60 mL/min    GFR Comment         CBC with Auto Differential   Result Value Ref Range    WBC 7.8 3.5 - 11.0 k/uL    RBC 5.51 (H) 4.0 - 5.2 m/uL    Hemoglobin 16.9 (H) 12.0 - 16.0 g/dL    Hematocrit 50.5 (H) 36 - 46 %    MCV 91.6 80 - 100 fL    MCH 30.7 26 - 34 pg    MCHC 33.5 31 - 37 g/dL    RDW 16.4 (H) 12.5 - 15.4 %    Platelets 933 506 - 407 k/uL    MPV 8.0 6.0 - 12.0 fL    Seg Neutrophils 43 36 - 66 %    Lymphocytes 42 24 - 44 %    Monocytes 13 (H) 2 - 11 %    Eosinophils % 1 1 - 4 %    Basophils 1 0 - 2 %    Segs Absolute 3.30 1.8 - 7.7 k/uL    Absolute Lymph # 3.30 1.0 - 4.8 k/uL    Absolute Mono # 1.00 0.1 - 1.2 k/uL    Absolute Eos # 0.00 0.0 - 0.4 k/uL    Basophils Absolute 0.10 0.0 - 0.2 k/uL   Lactic Acid   Result Value Ref Range    Lactic Acid 2.4 (H) 0.5 - 2.2 mmol/L   Hepatic Function Panel   Result Value Ref Range    Albumin 3.1 (L) 3.5 - 5.2 g/dL    Alkaline Phosphatase 138 (H) 35 - 104 U/L    ALT 41 (H) 5 - 33 U/L    AST 59 (H) <32 U/L    Total Bilirubin 1.62 (H) 0.3 - 1.2 mg/dL    Bilirubin, Direct 0.48 (H) <0.31 mg/dL    Bilirubin, Indirect 1.14 (H) 0.00 - 1.00 mg/dL    Total Protein 6.9 6.4 - 8.3 g/dL    Albumin/Globulin Ratio 0.8 (L) 1.0 - 2.5   Lipase   Result Value Ref Range    Lipase 20 13 - 60 U/L   Troponin   Result Value Ref Range    Troponin, High Sensitivity 6 0 - 14 ng/L   Urinalysis with Reflex to Culture   Result Value Ref Range    Color, UA Dark Yellow (A) Yellow    Turbidity UA Clear Clear    Glucose, Ur NEGATIVE NEGATIVE    Bilirubin Urine NEGATIVE  Verified by ictotest. (A) NEGATIVE    Ketones, Urine TRACE (A) NEGATIVE    Specific Gravity, UA 1.034 (H) 1.005 - 1.030    Urine Hgb NEGATIVE NEGATIVE    pH, UA 6.0 5.0 - 8.0    Protein, UA 1+ (A) NEGATIVE    Urobilinogen, Urine Normal Normal    Nitrite, Urine POSITIVE (A) NEGATIVE    Leukocyte Esterase, Urine NEGATIVE NEGATIVE   Microscopic Urinalysis   Result Value Ref Range    -          WBC, UA 2 TO 5 0 - 5 /HPF    RBC, UA 0 TO 2 0 - 2 /HPF    Casts UA 0 TO 2 /LPF    Casts UA HYALINE /LPF    Epithelial Cells UA 2 TO 5 0 - 5 /HPF    Bacteria, UA FEW (A) None    Other Observations UA (A) NOT REQ. Utilizing a urinalysis as the only screening method to exclude a potential uropathogen can be unreliable in many patient populations. Rapid screening tests are less sensitive than culture and if UTI is a clinical possibility, culture should be considered despite a negative urinalysis. EMERGENCY DEPARTMENT COURSE:   Vitals:    Vitals:    04/11/22 0715 04/11/22 0915   BP: (!) 161/95 (!) 152/78   Pulse: 111 97   Resp: 16 14   Temp: 98 °F (36.7 °C)    TempSrc: Oral    SpO2: 95% 94%   Weight: 113.4 kg (250 lb)    Height: 5' 4\" (1.626 m)      -------------------------  BP: (!) 152/78, Temp: 98 °F (36.7 °C), Pulse: 97, Resp: 14      RE-EVALUATION:  On repeat evaluation the patient is feeling better after IV fluids I did did review her labs and CT report and offer the patient admission to hospital setting giving her multiple hernias nausea vomiting diarrhea initially the patient refused however after discussing this with her  the patient states now she would prefer to be admitted as she has not been able to eat and drink much and feels as though additional IV fluids would help get her over her symptoms quicker so I will contact my hospitalist for admission    Consult:  Dr. Vladimir Quintana is kind enough to omit the patient to his service      PROCEDURES:  None    FINAL IMPRESSION      1. Nausea vomiting and diarrhea    2. Hypokalemia    3.  Abdominal wall hernia          DISPOSITION/PLAN   DISPOSITION        CONDITION ON DISPOSITION:   Improved - Stable    PATIENT REFERRED TO:  CONCHIS Alvarez - CNP  [address] [id number]  [phone]    Call in 1 day        DISCHARGE MEDICATIONS:  New Prescriptions    CEPHALEXIN (KEFLEX) 500 MG CAPSULE    Take 1 capsule by mouth 3 times daily for 7 days    ONDANSETRON (ZOFRAN) 4 MG TABLET    Take 1 tablet by mouth every 8 hours as needed for Nausea       (Please note that portions of this note were completed with a voicerecognition program.  Efforts were made to edit the dictations but occasionally words are mis-transcribed.)    Debby Rojas MD,, MD, F.A.C.E.P.   Attending Emergency Medicine Physician       Debby Rojas MD  04/11/22 1999

## 2022-04-12 VITALS
OXYGEN SATURATION: 93 % | BODY MASS INDEX: 48.55 KG/M2 | WEIGHT: 284.39 LBS | HEART RATE: 90 BPM | TEMPERATURE: 98.3 F | SYSTOLIC BLOOD PRESSURE: 133 MMHG | HEIGHT: 64 IN | DIASTOLIC BLOOD PRESSURE: 68 MMHG | RESPIRATION RATE: 16 BRPM

## 2022-04-12 PROBLEM — R19.7 NAUSEA VOMITING AND DIARRHEA: Status: ACTIVE | Noted: 2022-04-11

## 2022-04-12 LAB
ABSOLUTE EOS #: 0.05 K/UL (ref 0–0.4)
ABSOLUTE LYMPH #: 0.99 K/UL (ref 1–4.8)
ABSOLUTE MONO #: 0.41 K/UL (ref 0.1–0.8)
ALBUMIN SERPL-MCNC: 2.4 G/DL (ref 3.5–5.2)
ALBUMIN/GLOBULIN RATIO: 0.8 (ref 1–2.5)
ALP BLD-CCNC: 102 U/L (ref 35–104)
ALT SERPL-CCNC: 35 U/L (ref 5–33)
ANION GAP SERPL CALCULATED.3IONS-SCNC: 6 MMOL/L (ref 9–17)
AST SERPL-CCNC: 49 U/L
ATYPICAL LYMPHOCYTE ABSOLUTE COUNT: 0.23 K/UL
ATYPICAL LYMPHOCYTES: 5 %
BASOPHILS # BLD: 1 % (ref 0–2)
BASOPHILS ABSOLUTE: 0.05 K/UL (ref 0–0.2)
BILIRUB SERPL-MCNC: 1.05 MG/DL (ref 0.3–1.2)
BUN BLDV-MCNC: 9 MG/DL (ref 8–23)
CALCIUM SERPL-MCNC: 7.1 MG/DL (ref 8.6–10.4)
CHLORIDE BLD-SCNC: 97 MMOL/L (ref 98–107)
CO2: 30 MMOL/L (ref 20–31)
CREAT SERPL-MCNC: 0.71 MG/DL (ref 0.5–0.9)
EKG ATRIAL RATE: 99 BPM
EKG P AXIS: 22 DEGREES
EKG P-R INTERVAL: 178 MS
EKG Q-T INTERVAL: 358 MS
EKG QRS DURATION: 90 MS
EKG QTC CALCULATION (BAZETT): 459 MS
EKG R AXIS: -18 DEGREES
EKG T AXIS: 17 DEGREES
EKG VENTRICULAR RATE: 99 BPM
EOSINOPHILS RELATIVE PERCENT: 1 % (ref 1–4)
GFR AFRICAN AMERICAN: >60 ML/MIN
GFR NON-AFRICAN AMERICAN: >60 ML/MIN
GFR SERPL CREATININE-BSD FRML MDRD: ABNORMAL ML/MIN/{1.73_M2}
GLUCOSE BLD-MCNC: 101 MG/DL (ref 70–99)
HCT VFR BLD CALC: 44.4 % (ref 36–46)
HEMOGLOBIN: 14.9 G/DL (ref 12–16)
LYMPHOCYTES # BLD: 22 % (ref 24–44)
MAGNESIUM: 1.2 MG/DL (ref 1.6–2.6)
MCH RBC QN AUTO: 30.9 PG (ref 26–34)
MCHC RBC AUTO-ENTMCNC: 33.5 G/DL (ref 31–37)
MCV RBC AUTO: 92.3 FL (ref 80–100)
MONOCYTES # BLD: 9 % (ref 1–7)
MORPHOLOGY: ABNORMAL
PDW BLD-RTO: 16.7 % (ref 12.5–15.4)
PLATELET # BLD: 221 K/UL (ref 140–450)
PMV BLD AUTO: 7.9 FL (ref 6–12)
POTASSIUM SERPL-SCNC: 3.4 MMOL/L (ref 3.7–5.3)
RBC # BLD: 4.81 M/UL (ref 4–5.2)
SEG NEUTROPHILS: 62 % (ref 36–66)
SEGMENTED NEUTROPHILS ABSOLUTE COUNT: 2.77 K/UL (ref 1.8–7.7)
SODIUM BLD-SCNC: 133 MMOL/L (ref 135–144)
TOTAL PROTEIN: 5.4 G/DL (ref 6.4–8.3)
WBC # BLD: 4.5 K/UL (ref 3.5–11)

## 2022-04-12 PROCEDURE — G0378 HOSPITAL OBSERVATION PER HR: HCPCS

## 2022-04-12 PROCEDURE — 2580000003 HC RX 258: Performed by: STUDENT IN AN ORGANIZED HEALTH CARE EDUCATION/TRAINING PROGRAM

## 2022-04-12 PROCEDURE — 6370000000 HC RX 637 (ALT 250 FOR IP): Performed by: STUDENT IN AN ORGANIZED HEALTH CARE EDUCATION/TRAINING PROGRAM

## 2022-04-12 PROCEDURE — 96376 TX/PRO/DX INJ SAME DRUG ADON: CPT

## 2022-04-12 PROCEDURE — 85025 COMPLETE CBC W/AUTO DIFF WBC: CPT

## 2022-04-12 PROCEDURE — 80053 COMPREHEN METABOLIC PANEL: CPT

## 2022-04-12 PROCEDURE — 96372 THER/PROPH/DIAG INJ SC/IM: CPT

## 2022-04-12 PROCEDURE — 6360000002 HC RX W HCPCS: Performed by: STUDENT IN AN ORGANIZED HEALTH CARE EDUCATION/TRAINING PROGRAM

## 2022-04-12 PROCEDURE — 6370000000 HC RX 637 (ALT 250 FOR IP): Performed by: HOSPITALIST

## 2022-04-12 PROCEDURE — 36415 COLL VENOUS BLD VENIPUNCTURE: CPT

## 2022-04-12 PROCEDURE — 83735 ASSAY OF MAGNESIUM: CPT

## 2022-04-12 PROCEDURE — 99217 PR OBSERVATION CARE DISCHARGE MANAGEMENT: CPT | Performed by: HOSPITALIST

## 2022-04-12 RX ORDER — POTASSIUM CHLORIDE 20 MEQ/1
40 TABLET, EXTENDED RELEASE ORAL ONCE
Status: COMPLETED | OUTPATIENT
Start: 2022-04-12 | End: 2022-04-12

## 2022-04-12 RX ORDER — POTASSIUM CHLORIDE 20 MEQ/1
40 TABLET, EXTENDED RELEASE ORAL ONCE
Status: DISCONTINUED | OUTPATIENT
Start: 2022-04-12 | End: 2022-04-12

## 2022-04-12 RX ADMIN — SODIUM CHLORIDE: 9 INJECTION, SOLUTION INTRAVENOUS at 11:44

## 2022-04-12 RX ADMIN — CITALOPRAM HYDROBROMIDE 10 MG: 20 TABLET, FILM COATED ORAL at 08:36

## 2022-04-12 RX ADMIN — ENOXAPARIN SODIUM 30 MG: 100 INJECTION SUBCUTANEOUS at 08:38

## 2022-04-12 RX ADMIN — POTASSIUM CHLORIDE 40 MEQ: 1500 TABLET, EXTENDED RELEASE ORAL at 08:38

## 2022-04-12 RX ADMIN — SODIUM CHLORIDE, PRESERVATIVE FREE 10 ML: 5 INJECTION INTRAVENOUS at 08:45

## 2022-04-12 RX ADMIN — ONDANSETRON 4 MG: 2 INJECTION INTRAMUSCULAR; INTRAVENOUS at 06:19

## 2022-04-12 RX ADMIN — LISINOPRIL 10 MG: 10 TABLET ORAL at 08:38

## 2022-04-12 RX ADMIN — HYDROCHLOROTHIAZIDE 12.5 MG: 25 TABLET ORAL at 08:37

## 2022-04-12 ASSESSMENT — PAIN SCALES - GENERAL
PAINLEVEL_OUTOF10: 0

## 2022-04-12 NOTE — PROGRESS NOTES
Pt discharged via wheelchair with all belongings, scripts and discharge paperwork. Follow up appointments and discharge instructions reviewed with pt. All questions answered to satisfaction.

## 2022-04-12 NOTE — PROGRESS NOTES
[facility]  Office: [address], DO, Sherrie Garza, DO, Mary Ellen Lewis, DO, Fransisco Peralta Blood, DO, Emilie Sheth MD, Shira Luis MD, Michael Reyes MD, Jerad Beck MD, Pushpa Mccarthy MD, Hernando Ceballos MD, Romana Ramos MD, Winston Serra, DO, Trung Collado, DO, Juliane Ascencio MD,  Bruce Schroeder, DO, Berto Heredia MD, Eri Gale MD, Hope Opitz, MD, Mercedes Morales, DO, Harshil Pierre MD, Tenzin Matamoros MD, Abby Abdalla, [facility], [facility] KemAxsonnetta, CNP, Anneliese Abraham, CNP, Dennis Benavidez CNP, James Garcia, CNP, Marianna Warren, CNP, FLORENCIA ThomasC, Lindsey Colon, [facility], Demetrio Birmingham, CNP, Meche Lucas, CNP, Juanis Abel, CNP, Gilmar Coles, CNS, Katarzyna Cha, [facility], Vianey Parry, CNP, Laura Connolly, CNP, Royal Pickering, CNP         [address]    Progress Note    4/12/2022    2:29 PM    Name:   Bhavna Cuadra  MRN:     [id number]     Acct:      [de-identified]   Room:   [address] [place] [address] Day:  0  Admit Date:  4/11/2022  7:08 AM    PCP:   CONCHIS Vargas CNP  Code Status:  Full Code    Subjective:     C/C:   Chief Complaint   Patient presents with    Fever     since wednesday, denies sick contacts    Nausea & Vomiting    Diarrhea    Abdominal Pain     discomfort, hx of diverticulitis   Patient seen in follow-up for intractable nausea and vomiting secondary to clinical viral gastroenteritis, patient states \"I am feeling much better\"    Interval History Status: significantly improved. Patient is feeling much better. At this point time we will advance her diet. Clinically this is a viral gastroenteritis. She is mildly hypokalemic which we will replace with oral supplementation. The patient's mild hyponatremia has improved dramatically. At this point in time she is hopeful that if she is able to tolerate a diet we will provide her Zofran and she can be discharged home with outpatient follow-up. reports current alcohol use. She reports that she does not use drugs. Family History:   Family History   Problem Relation Age of Onset    Heart Disease Father     Hypertension Mother     Heart Disease Mother     Breast Cancer Neg Hx     Cancer Neg Hx     Colon Cancer Neg Hx     Diabetes Neg Hx     Eclampsia Neg Hx     Ovarian Cancer Neg Hx      Labor Neg Hx     Spont Abortions Neg Hx     Stroke Neg Hx        Vitals:  /68   Pulse 90   Temp 98.3 °F (36.8 °C) (Oral)   Resp 16   Ht 5' 4\" (1.626 m)   Wt 284 lb 6.3 oz (129 kg)   LMP 10/01/2013   SpO2 93%   BMI 48.82 kg/m²   Temp (24hrs), Av.5 °F (36.9 °C), Min:97.9 °F (36.6 °C), Max:99 °F (37.2 °C)    No results for input(s): POCGLU in the last 72 hours. I/O (24Hr):     Intake/Output Summary (Last 24 hours) at 2022 1429  Last data filed at 2022 1241  Gross per 24 hour   Intake 15 ml   Output 600 ml   Net -585 ml       Labs:  Hematology:  Recent Labs     22  0745 22  0528   WBC 7.8 4.5   RBC 5.51* 4.81   HGB 16.9* 14.9   HCT 50.5* 44.4   MCV 91.6 92.3   MCH 30.7 30.9   MCHC 33.5 33.5   RDW 16.4* 16.7*    221   MPV 8.0 7.9     Chemistry:  Recent Labs     22  0745 22  0528   * 133*   K 3.0* 3.4*   CL 90* 97*   CO2 30 30   GLUCOSE 119* 101*   BUN 14 9   CREATININE 0.87 0.71   MG  --  1.2*   ANIONGAP 10 6*   LABGLOM >60 >60   GFRAA >60 >60   CALCIUM 7.6* 7.1*   TROPHS 6  --      Recent Labs     22  0745 22  0528   PROT 6.9 5.4*   LABALBU 3.1* 2.4*   AST 59* 49*   ALT 41* 35*   ALKPHOS 138* 102   BILITOT 1.62* 1.05   BILIDIR 0.48*  --    LIPASE 20  --      ABG:No results found for: POCPH, PHART, PH, POCPCO2, ESQ8WRQ, PCO2, POCPO2, PO2ART, PO2, POCHCO3, LHX3PVA, HCO3, NBEA, PBEA, BEART, BE, THGBART, THB, MKX1ZQH, PYSV3OTH, J0IGBPMJ, O2SAT, FIO2  Lab Results   Component Value Date/Time    SPECIAL  2015 07:00 AM     RT HAND Performed at [address] [place] [address]

## 2022-04-12 NOTE — DISCHARGE SUMMARY
[facility]  Office: [address], DO, Ayden Pearce, DO, Bairon Horvath, DO, Corinne Alex Blood, DO, Grace Chowdhury MD, Mayi Martinez MD, Loly Busby MD, Christiano Hickman MD, Angel Hensley MD, Jaleesa Friend MD, Gabbi Burr MD, Annia Mixon, DO, Frankie Razo, DO, Lainey Busby MD,  Juanjose Alvarado DO, Ac Mayers MD, Dixie Kendrick MD, Lupe Rodriguez MD, Seth Solis DO, Carlos Tilley MD, Ean Camarillo MD, Elzbieta Nance, [facility], [facility], CNP, Jarvis Devi, CNP, Alpha Grain, CNP, Klaus Ing, CNP, Clyde Dorado, CNP, FLORENCIA CrespoC, Yanelis Arevalo, DNP, Kade Rangel, CNP, Waldemar Whiteside, CNP, Philippe Melendez, CNP, Ming Somers, CNS, Galen Bird, [facility], Ricky Reyes, CNP, Colby Castillo, CNP, Roland Messer, [facility]    Discharge Summary     Patient ID: Royce Baker  :  1960   MRN: [id number]     ACCOUNT:  [de-identified]   Patient's PCP: CONCHIS Sierra CNP  Admit Date: 2022   Discharge Date: 2022    Length of Stay: 0  Code Status:  Full Code  Admitting Physician: No admitting provider for patient encounter. Discharge Physician: Rose Villeda DO     Active Discharge Diagnoses:     Hospital Problem Lists:  Principal Problem:    Intractable vomiting with nausea  Active Problems:    Hypertension    History of depression  Resolved Problems:    * No resolved hospital problems. *      Admission Condition:  fair     Discharged Condition: good    Hospital Stay:     Hospital Course:  Royce Baker is a 62 y.o. female who was admitted for the management of   Intractable vomiting with nausea , presented to ER with Fever (since wednesday, denies sick contacts), Nausea & Vomiting, Diarrhea, and Abdominal Pain (discomfort, hx of diverticulitis)    Mrs. Daisy Tian first developed symptoms of a clinical gastroenteritis approximately a week ago.   She has been attempting to manage her symptoms and outpatient however presented to the hospital due to intractable nausea, vomiting, diarrhea. She was found to be in acute kidney injury with hyponatremia and hypokalemia. The patient was admitted with initiation of IV fluids. There is some concern of a possible urinary tract infection and she is being treated empirically with Keflex. Following fluid resuscitation and electrolyte correction the patient improved dramatically. The patient tolerated a diet and ultimately was transitioned to Zofran oral to control her symptomology. The patient is discharged home in stable condition with instructions to follow-up with her primary care physician. Although she did have some mild hyponatremia and hypokalemia it is reasonable to continue her hydrochlorothiazide at this point in time as both of these corrected well with fluid resuscitation. Significant therapeutic interventions: Fluid resuscitation, antiemetics    Significant Diagnostic Studies:   Labs / Micro:  CBC:   Lab Results   Component Value Date    WBC 4.5 04/12/2022    RBC 4.81 04/12/2022    RBC 4.61 10/17/2011    HGB 14.9 04/12/2022    HCT 44.4 04/12/2022    MCV 92.3 04/12/2022    MCH 30.9 04/12/2022    MCHC 33.5 04/12/2022    RDW 16.7 04/12/2022     04/12/2022     10/17/2011     BMP:    Lab Results   Component Value Date    GLUCOSE 101 04/12/2022    GLUCOSE 97 10/17/2011     04/12/2022    K 3.4 04/12/2022    CL 97 04/12/2022    CO2 30 04/12/2022    ANIONGAP 6 04/12/2022    BUN 9 04/12/2022    CREATININE 0.71 04/12/2022    BUNCRER NOT REPORTED 07/26/2016    CALCIUM 7.1 04/12/2022    LABGLOM >60 04/12/2022    GFRAA >60 04/12/2022    GFR      04/12/2022        Radiology:  CT ABDOMEN PELVIS W IV CONTRAST Additional Contrast? None    Result Date: 4/11/2022  Diastasis of the abdominal structures muscle with fat containing midline hernias throughout a majority of the span of the abdomen.   A hernia inferiorly contains fat and small bowel loops. Uncomplicated colonic diverticulosis. Consultations:    Consults:     Final Specialist Recommendations/Findings:   None      The patient was seen and examined on day of discharge and this discharge summary is in conjunction with any daily progress note from day of discharge. Discharge plan:     Disposition: Home    Physician Follow Up:     CONCHIS Alfaro CNP  [address] [facility]  [phone]    Call in 1 day         Requiring Further Evaluation/Follow Up POST HOSPITALIZATION/Incidental Findings: None    Diet: regular diet    Activity: As tolerated    Instructions to Patient: None    Discharge Medications:      Medication List      START taking these medications    cephALEXin 500 MG capsule  Commonly known as: Keflex  Take 1 capsule by mouth 3 times daily for 7 days     ondansetron 4 MG tablet  Commonly known as: Zofran  Take 1 tablet by mouth every 8 hours as needed for Nausea        CONTINUE taking these medications    citalopram 10 MG tablet  Commonly known as: CELEXA     hydroCHLOROthiazide 12.5 MG tablet  Commonly known as: HYDRODIURIL     lisinopril 10 MG tablet  Commonly known as: PRINIVIL;ZESTRIL           Where to Get Your Medications      These medications were sent to [facility], [address] [place] [address]    Phone: [phone]   · cephALEXin 500 MG capsule  · ondansetron 4 MG tablet         No discharge procedures on file. Time Spent on discharge is  20 mins in patient examination, evaluation, counseling as well as medication reconciliation, prescriptions for required medications, discharge plan and follow up. Electronically signed by   Saravanan Valencia DO  4/12/2022  3:22 PM      Thank you CONCHIS Lee CNP for the opportunity to be involved in this patient's care.

## 2022-04-13 ENCOUNTER — CARE COORDINATION (OUTPATIENT)
Dept: CASE MANAGEMENT | Age: 62
End: 2022-04-13

## 2022-04-13 NOTE — CARE COORDINATION
[facility] Transitions Initial Follow Up Call    Call within 2 business days of discharge: Yes    Patient: Terese Olszewski Patient : 1960   MRN: [id number]  Reason for Admission: Nausea/vomiting/diarrhea  Discharge Date: 22 RARS: No data recorded    Last Discharge [facility]       Complaint Diagnosis Description Type Department Provider    22 Fever; Nausea & Vomiting; Diarrhea; Abdominal Pain Nausea vomiting and diarrhea . .. ED to Hosp-Admission (Discharged) (ADMITTED) DEIDRA PETERS MS Tonya Andrea, DO; Mark Witt. ..           1st attempt to reach patient for Care Transitions. [facility] requesting return call. Contact information provided. [phone]    Facility: [facility]    Was able to contact Cheryl Palmer for initial transitional outreach. She stated that she was so much better. She said that she was able to sleep 12 hours last night. She said that she still is experiencing some diarrhea, but no further fevers, or N/V. She said that she was able to even eat. Medications reviewed and she stated that she has all her medications. She has a follow up with PCP tomorrow. She denied any concerns/questions or needs. Will not follow for care transitions due to having a non [facility] provider      Non-face-to-face services provided:  Obtained and reviewed discharge summary and/or continuity of care documents  Assessment and support for treatment adherence and medication management-reviewed     Transitions of Care Initial Call    Was this an external facility discharge? No Discharge Facility: [facility]    Challenges to be reviewed by the provider   Additional needs identified to be addressed with provider: No  none             Method of communication with provider : none      Advance Care Planning:   Does patient have an Advance Directive: pt stated had ACP doc, not up loaded in to chart. Was this a readmission?  No  Patient stated reason for admission: N/V/fever/diarrhea  Patients top risk factors for readmission: medical condition-viral infection/dehydration    Care Transition Nurse (CTN) contacted the patient by telephone to perform post hospital discharge assessment. Verified name and  with patient as identifiers. Provided introduction to self, and explanation of the CTN role. CTN reviewed discharge instructions, medical action plan and red flags with patient who verbalized understanding. Patient given an opportunity to ask questions and does not have any further questions or concerns at this time. Were discharge instructions available to patient? Yes. Reviewed appropriate site of care based on symptoms and resources available to patient including: PCP  When to call 911. The patient agrees to contact the PCP office for questions related to their healthcare. Medication review was performed with patient, who verbalizes understanding of administration of home medications. Covid Risk Education     Educated patient about risk for severe COVID-19 due to risk factors according to CDC guidelines. CTN reviewed discharge instructions, medical action plan and red flag symptoms with the patient who verbalized understanding. Discussed COVID vaccination status: Yes and vaccinated and has had booster. Education provided on COVID-19 vaccination as appropriate. Discussed exposure protocols and quarantine with CDC Guidelines. Patient was given an opportunity to verbalize any questions and concerns and agrees to contact CTN or health care provider for questions related to their healthcare. Reviewed and educated patient on any new and changed medications related to discharge diagnosis. Was patient discharged with a pulse oximeter? No       CTN provided contact information. No further follow-up call indicated based on severity of symptoms and risk factors.   Plan for next call: final call pt with [facility] provider        Care Transitions 24 Hour Call    Do you have any ongoing symptoms?: Yes  Patient-reported symptoms: Other (Comment: diarrhea)  Do you have a copy of your discharge instructions?: Yes  Do you have all of your prescriptions and are they filled?: Yes  Have you been contacted by a [address]?: No  Have you scheduled your follow up appointment?: Yes  How are you going to get to your appointment?: Car - drive self  Were you discharged with any Home Care or Post Acute Services: No  Do you feel like you have everything you need to keep you well at home?: Yes  Care Transitions Interventions         Follow Up  No future appointments.     Ramon Zaragoza RN

## 2022-04-25 ENCOUNTER — APPOINTMENT (OUTPATIENT)
Dept: ULTRASOUND IMAGING | Age: 62
End: 2022-04-25
Payer: COMMERCIAL

## 2022-04-25 ENCOUNTER — APPOINTMENT (OUTPATIENT)
Dept: CT IMAGING | Age: 62
End: 2022-04-25
Payer: COMMERCIAL

## 2022-04-25 ENCOUNTER — HOSPITAL ENCOUNTER (OUTPATIENT)
Age: 62
Setting detail: OBSERVATION
Discharge: HOME | End: 2022-04-26
Attending: EMERGENCY MEDICINE
Payer: COMMERCIAL

## 2022-04-25 DIAGNOSIS — I82.402 ACUTE DEEP VEIN THROMBOSIS (DVT) OF LEFT LOWER EXTREMITY, UNSPECIFIED VEIN (HCC): ICD-10-CM

## 2022-04-25 DIAGNOSIS — I26.99 BILATERAL PULMONARY EMBOLISM (HCC): Primary | ICD-10-CM

## 2022-04-25 PROBLEM — E66.01 MORBID OBESITY (HCC): Status: ACTIVE | Noted: 2022-04-25

## 2022-04-25 PROBLEM — I82.409 DVT (DEEP VENOUS THROMBOSIS) (HCC): Status: ACTIVE | Noted: 2022-04-25

## 2022-04-25 LAB
ABSOLUTE EOS #: 0.1 K/UL (ref 0–0.4)
ABSOLUTE LYMPH #: 1.9 K/UL (ref 1–4.8)
ABSOLUTE MONO #: 0.9 K/UL (ref 0.1–1.2)
ALBUMIN SERPL-MCNC: 3 G/DL (ref 3.5–5.2)
ALBUMIN/GLOBULIN RATIO: 0.8 (ref 1–2.5)
ALP BLD-CCNC: 130 U/L (ref 35–104)
ALT SERPL-CCNC: 19 U/L (ref 5–33)
ANION GAP SERPL CALCULATED.3IONS-SCNC: 12 MMOL/L (ref 9–17)
AST SERPL-CCNC: 28 U/L
BASOPHILS # BLD: 1 % (ref 0–2)
BASOPHILS ABSOLUTE: 0.1 K/UL (ref 0–0.2)
BILIRUB SERPL-MCNC: 1.72 MG/DL (ref 0.3–1.2)
BILIRUBIN DIRECT: 0.4 MG/DL
BILIRUBIN, INDIRECT: 1.32 MG/DL (ref 0–1)
BUN BLDV-MCNC: 19 MG/DL (ref 8–23)
CALCIUM SERPL-MCNC: 8.2 MG/DL (ref 8.6–10.4)
CHLORIDE BLD-SCNC: 94 MMOL/L (ref 98–107)
CO2: 28 MMOL/L (ref 20–31)
CREAT SERPL-MCNC: 0.71 MG/DL (ref 0.5–0.9)
EOSINOPHILS RELATIVE PERCENT: 1 % (ref 1–4)
GFR AFRICAN AMERICAN: >60 ML/MIN
GFR NON-AFRICAN AMERICAN: >60 ML/MIN
GFR SERPL CREATININE-BSD FRML MDRD: ABNORMAL ML/MIN/{1.73_M2}
GLUCOSE BLD-MCNC: 112 MG/DL (ref 70–99)
HCT VFR BLD CALC: 49.1 % (ref 36–46)
HEMOGLOBIN: 16.5 G/DL (ref 12–16)
LV EF: 62 %
LVEF MODALITY: NORMAL
LYMPHOCYTES # BLD: 27 % (ref 24–44)
MCH RBC QN AUTO: 30.9 PG (ref 26–34)
MCHC RBC AUTO-ENTMCNC: 33.5 G/DL (ref 31–37)
MCV RBC AUTO: 92.3 FL (ref 80–100)
MONOCYTES # BLD: 13 % (ref 2–11)
PARTIAL THROMBOPLASTIN TIME: 118.1 SEC (ref 21.3–31.3)
PARTIAL THROMBOPLASTIN TIME: 23.6 SEC (ref 21.3–31.3)
PDW BLD-RTO: 16.2 % (ref 12.5–15.4)
PLATELET # BLD: 374 K/UL (ref 140–450)
PMV BLD AUTO: 6.9 FL (ref 6–12)
POTASSIUM SERPL-SCNC: 4.2 MMOL/L (ref 3.7–5.3)
PRO-BNP: 78 PG/ML
RBC # BLD: 5.32 M/UL (ref 4–5.2)
SEG NEUTROPHILS: 58 % (ref 36–66)
SEGMENTED NEUTROPHILS ABSOLUTE COUNT: 4.2 K/UL (ref 1.8–7.7)
SODIUM BLD-SCNC: 134 MMOL/L (ref 135–144)
TOTAL PROTEIN: 7 G/DL (ref 6.4–8.3)
TROPONIN, HIGH SENSITIVITY: <6 NG/L (ref 0–14)
WBC # BLD: 7.2 K/UL (ref 3.5–11)

## 2022-04-25 PROCEDURE — 99219 PR INITIAL OBSERVATION CARE/DAY 50 MINUTES: CPT | Performed by: STUDENT IN AN ORGANIZED HEALTH CARE EDUCATION/TRAINING PROGRAM

## 2022-04-25 PROCEDURE — G0378 HOSPITAL OBSERVATION PER HR: HCPCS

## 2022-04-25 PROCEDURE — 85730 THROMBOPLASTIN TIME PARTIAL: CPT

## 2022-04-25 PROCEDURE — 93306 TTE W/DOPPLER COMPLETE: CPT

## 2022-04-25 PROCEDURE — 93971 EXTREMITY STUDY: CPT

## 2022-04-25 PROCEDURE — 99285 EMERGENCY DEPT VISIT HI MDM: CPT

## 2022-04-25 PROCEDURE — 96366 THER/PROPH/DIAG IV INF ADDON: CPT

## 2022-04-25 PROCEDURE — 36415 COLL VENOUS BLD VENIPUNCTURE: CPT

## 2022-04-25 PROCEDURE — 6360000004 HC RX CONTRAST MEDICATION: Performed by: EMERGENCY MEDICINE

## 2022-04-25 PROCEDURE — 93005 ELECTROCARDIOGRAM TRACING: CPT | Performed by: EMERGENCY MEDICINE

## 2022-04-25 PROCEDURE — 71260 CT THORAX DX C+: CPT

## 2022-04-25 PROCEDURE — 6370000000 HC RX 637 (ALT 250 FOR IP): Performed by: NURSE PRACTITIONER

## 2022-04-25 PROCEDURE — 96375 TX/PRO/DX INJ NEW DRUG ADDON: CPT

## 2022-04-25 PROCEDURE — 6360000002 HC RX W HCPCS: Performed by: EMERGENCY MEDICINE

## 2022-04-25 PROCEDURE — 80076 HEPATIC FUNCTION PANEL: CPT

## 2022-04-25 PROCEDURE — 80048 BASIC METABOLIC PNL TOTAL CA: CPT

## 2022-04-25 PROCEDURE — 83880 ASSAY OF NATRIURETIC PEPTIDE: CPT

## 2022-04-25 PROCEDURE — 6370000000 HC RX 637 (ALT 250 FOR IP): Performed by: STUDENT IN AN ORGANIZED HEALTH CARE EDUCATION/TRAINING PROGRAM

## 2022-04-25 PROCEDURE — 6360000002 HC RX W HCPCS: Performed by: STUDENT IN AN ORGANIZED HEALTH CARE EDUCATION/TRAINING PROGRAM

## 2022-04-25 PROCEDURE — 84484 ASSAY OF TROPONIN QUANT: CPT

## 2022-04-25 PROCEDURE — 85025 COMPLETE CBC W/AUTO DIFF WBC: CPT

## 2022-04-25 PROCEDURE — 2580000003 HC RX 258: Performed by: EMERGENCY MEDICINE

## 2022-04-25 PROCEDURE — 96365 THER/PROPH/DIAG IV INF INIT: CPT

## 2022-04-25 RX ORDER — HEPARIN SODIUM 1000 [USP'U]/ML
10000 INJECTION, SOLUTION INTRAVENOUS; SUBCUTANEOUS PRN
Status: DISCONTINUED | OUTPATIENT
Start: 2022-04-25 | End: 2022-04-25

## 2022-04-25 RX ORDER — LANOLIN ALCOHOL/MO/W.PET/CERES
3 CREAM (GRAM) TOPICAL NIGHTLY PRN
Status: DISCONTINUED | OUTPATIENT
Start: 2022-04-25 | End: 2022-04-26 | Stop reason: HOSPADM

## 2022-04-25 RX ORDER — CITALOPRAM 20 MG/1
10 TABLET ORAL DAILY
Status: DISCONTINUED | OUTPATIENT
Start: 2022-04-25 | End: 2022-04-26 | Stop reason: HOSPADM

## 2022-04-25 RX ORDER — HEPARIN SODIUM 1000 [USP'U]/ML
80 INJECTION, SOLUTION INTRAVENOUS; SUBCUTANEOUS PRN
Status: DISCONTINUED | OUTPATIENT
Start: 2022-04-25 | End: 2022-04-26 | Stop reason: HOSPADM

## 2022-04-25 RX ORDER — ONDANSETRON 4 MG/1
4 TABLET, ORALLY DISINTEGRATING ORAL EVERY 8 HOURS PRN
Status: DISCONTINUED | OUTPATIENT
Start: 2022-04-25 | End: 2022-04-26 | Stop reason: HOSPADM

## 2022-04-25 RX ORDER — SODIUM CHLORIDE 9 MG/ML
INJECTION, SOLUTION INTRAVENOUS PRN
Status: DISCONTINUED | OUTPATIENT
Start: 2022-04-25 | End: 2022-04-26 | Stop reason: HOSPADM

## 2022-04-25 RX ORDER — HEPARIN SODIUM 1000 [USP'U]/ML
40 INJECTION, SOLUTION INTRAVENOUS; SUBCUTANEOUS PRN
Status: DISCONTINUED | OUTPATIENT
Start: 2022-04-25 | End: 2022-04-26 | Stop reason: HOSPADM

## 2022-04-25 RX ORDER — SODIUM CHLORIDE 0.9 % (FLUSH) 0.9 %
10 SYRINGE (ML) INJECTION PRN
Status: DISCONTINUED | OUTPATIENT
Start: 2022-04-25 | End: 2022-04-25 | Stop reason: SDUPTHER

## 2022-04-25 RX ORDER — POLYETHYLENE GLYCOL 3350 17 G/17G
17 POWDER, FOR SOLUTION ORAL DAILY PRN
Status: DISCONTINUED | OUTPATIENT
Start: 2022-04-25 | End: 2022-04-26 | Stop reason: HOSPADM

## 2022-04-25 RX ORDER — SODIUM CHLORIDE 0.9 % (FLUSH) 0.9 %
5-40 SYRINGE (ML) INJECTION PRN
Status: DISCONTINUED | OUTPATIENT
Start: 2022-04-25 | End: 2022-04-26 | Stop reason: HOSPADM

## 2022-04-25 RX ORDER — SODIUM CHLORIDE 0.9 % (FLUSH) 0.9 %
5-40 SYRINGE (ML) INJECTION EVERY 12 HOURS SCHEDULED
Status: DISCONTINUED | OUTPATIENT
Start: 2022-04-25 | End: 2022-04-26 | Stop reason: HOSPADM

## 2022-04-25 RX ORDER — ACETAMINOPHEN 325 MG/1
650 TABLET ORAL EVERY 6 HOURS PRN
Status: DISCONTINUED | OUTPATIENT
Start: 2022-04-25 | End: 2022-04-26 | Stop reason: HOSPADM

## 2022-04-25 RX ORDER — ONDANSETRON 2 MG/ML
4 INJECTION INTRAMUSCULAR; INTRAVENOUS EVERY 6 HOURS PRN
Status: DISCONTINUED | OUTPATIENT
Start: 2022-04-25 | End: 2022-04-26 | Stop reason: HOSPADM

## 2022-04-25 RX ORDER — 0.9 % SODIUM CHLORIDE 0.9 %
80 INTRAVENOUS SOLUTION INTRAVENOUS ONCE
Status: DISCONTINUED | OUTPATIENT
Start: 2022-04-25 | End: 2022-04-26 | Stop reason: HOSPADM

## 2022-04-25 RX ORDER — HYDROCHLOROTHIAZIDE 25 MG/1
12.5 TABLET ORAL DAILY
Status: DISCONTINUED | OUTPATIENT
Start: 2022-04-25 | End: 2022-04-26 | Stop reason: HOSPADM

## 2022-04-25 RX ORDER — HEPARIN SODIUM 1000 [USP'U]/ML
10000 INJECTION, SOLUTION INTRAVENOUS; SUBCUTANEOUS ONCE
Status: DISCONTINUED | OUTPATIENT
Start: 2022-04-25 | End: 2022-04-25

## 2022-04-25 RX ORDER — HEPARIN SODIUM 1000 [USP'U]/ML
80 INJECTION, SOLUTION INTRAVENOUS; SUBCUTANEOUS ONCE
Status: COMPLETED | OUTPATIENT
Start: 2022-04-25 | End: 2022-04-25

## 2022-04-25 RX ORDER — HEPARIN SODIUM 1000 [USP'U]/ML
5000 INJECTION, SOLUTION INTRAVENOUS; SUBCUTANEOUS PRN
Status: DISCONTINUED | OUTPATIENT
Start: 2022-04-25 | End: 2022-04-25

## 2022-04-25 RX ORDER — LISINOPRIL 10 MG/1
10 TABLET ORAL DAILY
Status: DISCONTINUED | OUTPATIENT
Start: 2022-04-25 | End: 2022-04-26 | Stop reason: HOSPADM

## 2022-04-25 RX ORDER — ACETAMINOPHEN 650 MG/1
650 SUPPOSITORY RECTAL EVERY 6 HOURS PRN
Status: DISCONTINUED | OUTPATIENT
Start: 2022-04-25 | End: 2022-04-26 | Stop reason: HOSPADM

## 2022-04-25 RX ADMIN — MELATONIN 3 MG: 3 TAB ORAL at 23:14

## 2022-04-25 RX ADMIN — HYDROCHLOROTHIAZIDE 12.5 MG: 25 TABLET ORAL at 11:58

## 2022-04-25 RX ADMIN — IOPAMIDOL 75 ML: 755 INJECTION, SOLUTION INTRAVENOUS at 09:13

## 2022-04-25 RX ADMIN — Medication 14.3 UNITS/KG/HR: at 17:47

## 2022-04-25 RX ADMIN — CITALOPRAM HYDROBROMIDE 10 MG: 20 TABLET, FILM COATED ORAL at 11:57

## 2022-04-25 RX ADMIN — Medication 14.3 UNITS/KG/HR: at 23:19

## 2022-04-25 RX ADMIN — HEPARIN SODIUM 9690 UNITS: 1000 INJECTION INTRAVENOUS; SUBCUTANEOUS at 10:14

## 2022-04-25 RX ADMIN — Medication 17.3 UNITS/KG/HR: at 10:25

## 2022-04-25 RX ADMIN — LISINOPRIL 10 MG: 10 TABLET ORAL at 11:58

## 2022-04-25 RX ADMIN — SODIUM CHLORIDE, PRESERVATIVE FREE 10 ML: 5 INJECTION INTRAVENOUS at 09:13

## 2022-04-25 RX ADMIN — Medication 80 ML: at 09:13

## 2022-04-25 RX ADMIN — ACETAMINOPHEN 650 MG: 325 TABLET ORAL at 20:33

## 2022-04-25 ASSESSMENT — PAIN DESCRIPTION - LOCATION
LOCATION: LEG
LOCATION: LEG

## 2022-04-25 ASSESSMENT — PAIN DESCRIPTION - ONSET
ONSET: ON-GOING
ONSET: ON-GOING

## 2022-04-25 ASSESSMENT — PAIN - FUNCTIONAL ASSESSMENT
PAIN_FUNCTIONAL_ASSESSMENT: PREVENTS OR INTERFERES SOME ACTIVE ACTIVITIES AND ADLS
PAIN_FUNCTIONAL_ASSESSMENT: 0-10

## 2022-04-25 ASSESSMENT — PAIN SCALES - GENERAL
PAINLEVEL_OUTOF10: 4
PAINLEVEL_OUTOF10: 0
PAINLEVEL_OUTOF10: 3

## 2022-04-25 ASSESSMENT — PAIN DESCRIPTION - ORIENTATION
ORIENTATION: OTHER (COMMENT)
ORIENTATION: LEFT

## 2022-04-25 ASSESSMENT — PAIN DESCRIPTION - FREQUENCY
FREQUENCY: INTERMITTENT
FREQUENCY: INTERMITTENT

## 2022-04-25 ASSESSMENT — PAIN DESCRIPTION - PAIN TYPE
TYPE: ACUTE PAIN
TYPE: ACUTE PAIN

## 2022-04-25 ASSESSMENT — PAIN DESCRIPTION - DESCRIPTORS
DESCRIPTORS: ACHING;DISCOMFORT
DESCRIPTORS: ACHING

## 2022-04-25 ASSESSMENT — ENCOUNTER SYMPTOMS: ROS SKIN COMMENTS: LEFT LEG SWELLING

## 2022-04-25 NOTE — CARE COORDINATION
04/25/22 1456   Readmission Assessment   Number of Days since last admission? 8-30 days   Previous Disposition Home with Family   Who is being Interviewed Patient   What was the patient's/caregiver's perception as to why they think they needed to return back to the hospital? Other (Comment)  (Leg pain)   Did you visit your Primary Care Physician after you left the hospital, before you returned this time? Yes   Did you see a specialist, such as Cardiac, Pulmonary, Orthopedic Physician, etc. after you left the hospital? No   Who advised the patient to return to the hospital? Self-referral   Does the patient report anything that got in the way of taking their medications? No   In our efforts to provide the best possible care to you and others like you, can you think of anything that we could have done to help you after you left the hospital the first time, so that you might not have needed to return so soon?  Other (Comment)  (no)

## 2022-04-25 NOTE — ED NOTES
Patient called nurses station via call light and advised that she is having some shortness of breath. This nurse checked on the patient. Patient described a \"heaviness\" located in the middle of her chest and that it is causing her to have difficulty breathing. Patient was placed on the heart monitor.  Dr. Duke Lopez was updated, he advised to continue to evaluate the patient and obtain a 12 lead EKG if symptoms persist.      Pako Luz RN  04/25/22 0800

## 2022-04-25 NOTE — PROGRESS NOTES
Physical Therapy        Physical Therapy Cancel Note      DATE: 2022    NAME: Jeet Marin  MRN: [id number]   : 1960      Patient not seen this date for Physical Therapy due to:    Patient is not appropriate for PT evaluation/treatment at this time d/t on heparin drip for found PE and DVT- started today at 9:45am. Check back       Electronically signed by Adin Martinez PT on 2022 at 12:32 PM

## 2022-04-25 NOTE — H&P
[facility]  Office: [address], DO, Lucia Skaggs, DO, Ap Simmons, DO, Lennox Mars Blood, DO, Inés Reyez MD, Manny Murray MD, Juan Colon MD, Wisam Baron MD, Sarai Bates MD, Mika Mercer MD, Jhoana Samuel MD, Flor Juarez, DO, Oneida Buitrago, DO, Eloisa Murphy MD,  Galilea Viramontes, DO, Burna Cockayne, MD, Lina Murray MD, Marimar Raymundo MD, Curt Carbajal, DO, Gaston Sorensen MD, Helen Vanegas MD, Mauricio Rosario, [facility], [facility] Hospital[facility] Noel, CNP, Rocio Okeefe, CNP, Linda Valdez, CNP, Kim Saldana, CNP, Mel Nicholson, CNP, Allison Lala PA-C, Daisey Meigs, [facility], Alie Ocampo, CNP, Paloma Cheek, CNP, Dilip Reyes, CNP, Paddy Duane, CNS, Shereen Tran, [facility], Daxa Hagan, CNP, Summer Lamb, CNP, Ayla Meyer, [address]    HISTORY AND PHYSICAL EXAMINATION            Date:   4/25/2022  Patient name:  Stella Carrillo  Date of admission:  4/25/2022  7:08 AM  MRN:   [id number]  Account:  [de-identified]  YOB: 1960  PCP:    CONCHIS Diaz CNP  Room:   ER06/ER06  Code Status:    Prior    Chief Complaint:     Chief Complaint   Patient presents with    Leg Swelling     left    Leg Pain     left     History Obtained From:     patient    History of Present Illness:     Stella Carrillo is a 65 y.o. female with a past medical history of hypertension, depression and obesity who presented to the emergency department on 4/25/2022 complaining of left calf pain, left leg swelling and chest discomfort. The patient states that her symptoms began several weeks ago and have progressively worsened. In the ED, the patient was afebrile but tachycardic and uncomfortable appearing. Venous doppler of the left leg was remarkable for DVT of the posterior tibial and peroneal veins. CTPA was done and was significant for bilateral pulmonary emboli with evidence of mild right heart strain.  The patient is admitted to internal medicine for further management of acute DVT and bilateral pulmonary emboli. Past Medical History:     Past Medical History:   Diagnosis Date    Anemia 10/23/2013    History of depression     History of spontaneous      Hyperlipidemia     Hypertension     Morbid obesity ([address] 2022    Vision abnormalities         Past Surgical History:     Past Surgical History:   Procedure Laterality Date    CHOLECYSTECTOMY      COLONOSCOPY      [facility]    HERNIA REPAIR Right 1987        Medications Prior to Admission:     Prior to Admission medications    Medication Sig Start Date End Date Taking? Authorizing Provider   citalopram (CELEXA) 10 MG tablet TAKE 1 TABLET BY MOUTH EVERY DAY 21   Historical Provider, MD   lisinopril (PRINIVIL;ZESTRIL) 10 MG tablet TAKE 1 TABLET BY MOUTH EVERY DAY 22   Historical Provider, MD   hydroCHLOROthiazide (HYDRODIURIL) 12.5 MG tablet TAKE 1 TABLET BY MOUTH EVERY DAY 22   Historical Provider, MD   ondansetron (ZOFRAN) 4 MG tablet Take 1 tablet by mouth every 8 hours as needed for Nausea 22   Katherin Santiago MD        Allergies:     Codeine    Social History:     Tobacco:    reports that she has never smoked. She has never used smokeless tobacco.  Alcohol:      reports current alcohol use. Drug Use:  reports no history of drug use. Family History:     Family History   Problem Relation Age of Onset    Heart Disease Father     Hypertension Mother     Heart Disease Mother     Breast Cancer Neg Hx     Cancer Neg Hx     Colon Cancer Neg Hx     Diabetes Neg Hx     Eclampsia Neg Hx     Ovarian Cancer Neg Hx      Labor Neg Hx     Spont Abortions Neg Hx     Stroke Neg Hx        Review of Systems:     Positive and Negative as described in HPI.     CONSTITUTIONAL:  negative for fevers, chills, sweats, fatigue, weight loss  HEENT:  negative for vision, hearing changes, runny nose, throat pain  RESPIRATORY:  negative for shortness of breath, cough, congestion, wheezing  CARDIOVASCULAR:  Positive for mild chest pain. GASTROINTESTINAL:  negative for nausea, vomiting, diarrhea, constipation, change in bowel habits, abdominal pain   GENITOURINARY:  negative for difficulty of urination, burning with urination, frequency   INTEGUMENT:  negative for rash, skin lesions, easy bruising   HEMATOLOGIC/LYMPHATIC:  negative for swelling/edema   ALLERGIC/IMMUNOLOGIC:  negative for urticaria , itching  ENDOCRINE:  negative increase in drinking, increase in urination, hot or cold intolerance  MUSCULOSKELETAL:  Positive for left leg swelling. NEUROLOGICAL:  negative for headaches, dizziness, lightheadedness, numbness, pain, tingling extremities  BEHAVIOR/PSYCH:  negative for depression, anxiety    Physical Exam:   BP (!) 149/90   Pulse 100   Temp 98.1 °F (36.7 °C) (Oral)   Resp 19   Ht 5' 4\" (1.626 m)   Wt 267 lb (121.1 kg)   LMP 10/01/2013   SpO2 93%   BMI 45.83 kg/m²   Temp (24hrs), Av.1 °F (36.7 °C), Min:98.1 °F (36.7 °C), Max:98.1 °F (36.7 °C)    No results for input(s): POCGLU in the last 72 hours. No intake or output data in the 24 hours ending 22 1019    General Appearance:  alert, well appearing, and in no acute distress  Mental status: oriented to person, place, and time  Head:  normocephalic, atraumatic  Eye: no icterus, redness, pupils equal and reactive, extraocular eye movements intact, conjunctiva clear  Ear: normal external ear, no discharge, hearing intact  Nose:  no drainage noted  Mouth: mucous membranes moist  Neck: supple, no carotid bruits, thyroid not palpable  Lungs: Bilateral equal air entry, clear to ausculation, no wheezing, rales or rhonchi, normal effort  Cardiovascular: Tachycardia appreciated. Abdomen: Obese.  Soft, nontender, nondistended, normal bowel sounds, no hepatomegaly or splenomegaly  Neurologic: There are no new focal motor or sensory deficits, normal muscle tone and bulk, no abnormal sensation, normal speech, cranial nerves II through XII grossly intact  Skin: No gross lesions, rashes, bruising or bleeding on exposed skin area  Extremities:  Bilateral lower extremity edema appreciated; left greater than right   Psych: normal affect     Investigations:      Laboratory Testing:  Recent Results (from the past 24 hour(s))   CBC with Auto Differential    Collection Time: 04/25/22  8:36 AM   Result Value Ref Range    WBC 7.2 3.5 - 11.0 k/uL    RBC 5.32 (H) 4.0 - 5.2 m/uL    Hemoglobin 16.5 (H) 12.0 - 16.0 g/dL    Hematocrit 49.1 (H) 36 - 46 %    MCV 92.3 80 - 100 fL    MCH 30.9 26 - 34 pg    MCHC 33.5 31 - 37 g/dL    RDW 16.2 (H) 12.5 - 15.4 %    Platelets 606 731 - 050 k/uL    MPV 6.9 6.0 - 12.0 fL    Seg Neutrophils 58 36 - 66 %    Lymphocytes 27 24 - 44 %    Monocytes 13 (H) 2 - 11 %    Eosinophils % 1 1 - 4 %    Basophils 1 0 - 2 %    Segs Absolute 4.20 1.8 - 7.7 k/uL    Absolute Lymph # 1.90 1.0 - 4.8 k/uL    Absolute Mono # 0.90 0.1 - 1.2 k/uL    Absolute Eos # 0.10 0.0 - 0.4 k/uL    Basophils Absolute 0.10 0.0 - 0.2 k/uL   Basic Metabolic Panel    Collection Time: 04/25/22  8:36 AM   Result Value Ref Range    Glucose 112 (H) 70 - 99 mg/dL    BUN 19 8 - 23 mg/dL    CREATININE 0.71 0.50 - 0.90 mg/dL    Calcium 8.2 (L) 8.6 - 10.4 mg/dL    Sodium 134 (L) 135 - 144 mmol/L    Potassium 4.2 3.7 - 5.3 mmol/L    Chloride 94 (L) 98 - 107 mmol/L    CO2 28 20 - 31 mmol/L    Anion Gap 12 9 - 17 mmol/L    GFR Non-African American >60 >60 mL/min    GFR African American >60 >60 mL/min    GFR Comment         Troponin    Collection Time: 04/25/22  8:36 AM   Result Value Ref Range    Troponin, High Sensitivity <6 0 - 14 ng/L   Hepatic Function Panel    Collection Time: 04/25/22  8:36 AM   Result Value Ref Range    Albumin 3.0 (L) 3.5 - 5.2 g/dL    Alkaline Phosphatase 130 (H) 35 - 104 U/L    ALT 19 5 - 33 U/L    AST 28 <32 U/L    Total Bilirubin 1.72 (H) 0.3 - 1.2 mg/dL Bilirubin, Direct 0.40 (H) <0.31 mg/dL    Bilirubin, Indirect 1.32 (H) 0.00 - 1.00 mg/dL    Total Protein 7.0 6.4 - 8.3 g/dL    Albumin/Globulin Ratio 0.8 (L) 1.0 - 2.5   Brain Natriuretic Peptide    Collection Time: 04/25/22  8:36 AM   Result Value Ref Range    Pro-BNP 78 <300 pg/mL   APTT    Collection Time: 04/25/22  8:36 AM   Result Value Ref Range    PTT 23.6 21.3 - 31.3 sec       Imaging/Diagnostics:  CT CHEST PULMONARY EMBOLISM W CONTRAST    Result Date: 4/25/2022  1. Diffuse bilateral small filling defects mainly in segmental and subsegmental branches and minimal involvement of the lobar branches compatible with acute pulmonary emboli. Mild right heart strain. 2. Subsegmental atelectasis at the left lung base as discussed above. Findings were discussed with Dr. Nadir Branch at 9:32 am on 4/25/2022. RECOMMENDATIONS: Unavailable     US DUP LOWER EXTREMITY LEFT JATIN    Result Date: 4/25/2022  Calf DVT in the posterior tibial and peroneal veins. Findings discussed with Dr. Joelle Olea from the ureters are not on 04/25/2022 at 8:30 a.m.        Assessment :      Hospital Problems           Last Modified POA    * (Principal) Bilateral pulmonary embolism ([address] 4/25/2022 Yes    DVT (deep venous thrombosis) ([address] 4/25/2022 Yes    Morbid obesity ([address] 4/25/2022 Yes    Hypertension 4/25/2022 Yes    History of depression 4/25/2022 Yes          Plan:     Patient status observation in the Med/Surge    Acute pulmonary embolism   -CT chest showing diffuse bilateral and small filling defects consistent with bilateral pulmonary emboli with mild right heart strain   -Provoked secondary to LLE DVT   -Continue high-dose heparin infusion x 24-hours   -Plan to transition to Eliquis tomorrow AM   -Echocardiogram pending   -Recommend continuing Eliquis for a minimum of 90-days on discharge     Acute DVT   -LLE doppler showing a calf DVT in the posterior tibial and peroneal veins   -Heparin infusion as above     Hypertension -Continue home lisinopril 10 mg daily   -Continue home hydrochlorothiazide 12.5 mg daily     Depression   -Continue home citalopram 10 mg daily     Obesity 2/2 excessive caloric intake   -Will  on dietary modifications when appropriate     Consultations:   None      Bonita Rojas MD  4/25/2022  10:19 AM    Copy sent to CONCHIS White - CNP

## 2022-04-25 NOTE — PLAN OF CARE
Problem: Discharge Planning  Goal: Discharge to home or other facility with appropriate resources  Outcome: Progressing     Problem: Pain  Goal: Verbalizes/displays adequate comfort level or baseline comfort level  Outcome: Progressing     Problem: Skin/Tissue Integrity  Goal: Absence of new skin breakdown  Description: 1. Monitor for areas of redness and/or skin breakdown  2. Assess vascular access sites hourly  3. Every 4-6 hours minimum:  Change oxygen saturation probe site  4. Every 4-6 hours:  If on nasal continuous positive airway pressure, respiratory therapy assess nares and determine need for appliance change or resting period.   Outcome: Progressing     Problem: ABCDS Injury Assessment  Goal: Absence of physical injury  Outcome: Progressing

## 2022-04-25 NOTE — ED PROVIDER NOTES
[id number] [facility] ED  [address] [facility]. [facility] [address]  Phone: [phone]  Fax: [phone]        Pt Name: Elva Patel  MRN: [id number]  [place] 1960  Date of evaluation: 22      CHIEF COMPLAINT     Chief Complaint   Patient presents with    Leg Swelling     left    Leg Pain     left         HISTORY OF PRESENT ILLNESS  (Location/Symptom, Timing/Onset, Context/Setting, Quality, Duration, Modifying Factors, Severity.)    Elva Patel is a 65 y.o. female who presents with left leg pain and swelling. The patient dates for last couple days she has had pain and swelling denies any trauma denies any chest pain or shortness of breath no fever no chills no numbness no weakness nothing she does makes her symptoms better or worse the patient states approximately 2 weeks ago she was admitted to the hospital for gastroenteritis that has resolved no abdominal pain no nausea vomiting or diarrhea      REVIEW OF SYSTEMS    (2-9 systems for level 4, 10 or more for level 5)     Review of Systems   Musculoskeletal:        Left leg pain   Skin:        Left leg swelling   All other systems reviewed and are negative. PAST MEDICAL HISTORY    has a past medical history of Anemia, History of depression, History of spontaneous , Hyperlipidemia, Hypertension, and Vision abnormalities. SURGICAL HISTORY      has a past surgical history that includes Cholecystectomy (); hernia repair (Right, ); and Colonoscopy. CURRENTMEDICATIONS       Previous Medications    CITALOPRAM (CELEXA) 10 MG TABLET    TAKE 1 TABLET BY MOUTH EVERY DAY    HYDROCHLOROTHIAZIDE (HYDRODIURIL) 12.5 MG TABLET    TAKE 1 TABLET BY MOUTH EVERY DAY    LISINOPRIL (PRINIVIL;ZESTRIL) 10 MG TABLET    TAKE 1 TABLET BY MOUTH EVERY DAY    ONDANSETRON (ZOFRAN) 4 MG TABLET    Take 1 tablet by mouth every 8 hours as needed for Nausea       ALLERGIES     is allergic to codeine.     FAMILY HISTORY     She indicated that her mother is alive. She indicated that her father is . She indicated that the status of her neg hx is unknown.     family history includes Heart Disease in her father and mother; Hypertension in her mother. SOCIAL HISTORY      reports that she has never smoked. She has never used smokeless tobacco. She reports current alcohol use. She reports that she does not use drugs. PHYSICAL EXAM    (up to 7 for level 4, 8 or more for level 5)   INITIAL VITALS:  height is 5' 4\" (1.626 m) and weight is 128.8 kg (284 lb). Her oral temperature is 98.1 °F (36.7 °C). Her blood pressure is 149/90 (abnormal) and her pulse is 100. Her respiration is 19 and oxygen saturation is 93%. Physical Exam  Vitals and nursing note reviewed. Constitutional:       Appearance: Normal appearance. HENT:      Head: Normocephalic and atraumatic. Eyes:      Conjunctiva/sclera: Conjunctivae normal.   Cardiovascular:      Rate and Rhythm: Normal rate and regular rhythm. Pulmonary:      Effort: Pulmonary effort is normal.      Breath sounds: Normal breath sounds. Abdominal:      General: Bowel sounds are normal. There is no distension. Palpations: Abdomen is soft. There is no mass. Tenderness: There is no abdominal tenderness. There is no guarding or rebound. Musculoskeletal:         General: Normal range of motion. Cervical back: Normal range of motion and neck supple. Comments: Patient is noted to have some slight swelling of the left lower leg as compared to the right with some tenderness to palpation posterior knee region otherwise good pulses motor sensation throughout all extremities   Skin:     General: Skin is warm and dry. Capillary Refill: Capillary refill takes less than 2 seconds. Findings: No rash. Neurological:      General: No focal deficit present. Mental Status: She is alert.          DIFFERENTIAL DIAGNOSIS/ MDM:     I will get an ultrasound of the left leg    DIAGNOSTIC RESULTS     EKG: All EKG's are interpreted by the Emergency Department Physician who either signs or Co-signs this chart in the absence of a cardiologist.      Interpreted by Natalio Alarcon MD     Rhythm: sinus rhythm  Rate: 96  Axis: -14  Ectopy: none  Conduction: normal  ST Segments: no acute change  T Waves: no acute change  Q Waves: none    Clinical Impression: normal sinus rhythm with no acute changes/normal EKG. No acute infarction/ischemia noted. RADIOLOGY:    US DUP LOWER EXTREMITY LEFT JATIN (Final result)  Result time 04/25/22 08:33:14  Final result by Beth Ramirez MD (04/25/22 08:33:14)                Impression:    Calf DVT in the posterior tibial and peroneal veins. Findings discussed with Dr. Kaleb Syed from the ureters are not on 04/25/2022 at   8:30 a.m. Narrative:    EXAMINATION:   DUPLEX VENOUS ULTRASOUND OF THE LEFT LOWER EXTREMITY     4/25/2022 7:18 am     TECHNIQUE:   Duplex ultrasound using B-mode/gray scaled imaging and Doppler spectral   analysis and color flow was obtained of the deep venous structures of the   left lower extremity. COMPARISON:   None. HISTORY:   ORDERING SYSTEM PROVIDED HISTORY: Pain/swelling. TECHNOLOGIST PROVIDED HISTORY: Pain/swelling. FINDINGS:   There is evidence of DVT in the posterior tibial and peroneal veins.  The   popliteal and femoral veins appear patent with no evidence of DVT. Interpretation per the Radiologist below, if available at the time of this note:    CT CHEST PULMONARY EMBOLISM W CONTRAST (Final result)  Result time 04/25/22 09:33:36  Final result by Bigg Carranza MD (04/25/22 09:33:36)                Impression:    1. Diffuse bilateral small filling defects mainly in segmental and   subsegmental branches and minimal involvement of the lobar branches   compatible with acute pulmonary emboli.  Mild right heart strain.    2. Subsegmental atelectasis at the left lung base as discussed above. Findings were discussed with Dr. Shauna Campos at 9:32 am on 4/25/2022. RECOMMENDATIONS:   Unavailable             Narrative:    EXAMINATION:   CTA OF THE CHEST 4/25/2022 8:33 am     TECHNIQUE:   CTA of the chest was performed after the administration of intravenous   contrast.  Multiplanar reformatted images are provided for review.  MIP   images are provided for review. Dose modulation, iterative reconstruction,   and/or weight based adjustment of the mA/kV was utilized to reduce the   radiation dose to as low as reasonably achievable. COMPARISON:   02/17/2014     HISTORY:   ORDERING SYSTEM PROVIDED HISTORY: chest pain / known dvt   TECHNOLOGIST PROVIDED HISTORY:   chest pain / known dvt   Decision Support Exception - unselect if not a suspected or confirmed   emergency medical condition->Emergency Medical Condition (MA)   Reason for Exam: Pt c/o chest pain and leg swelling. Positive DVT on US today     FINDINGS:   Pulmonary Arteries: Scattered filling defects in segmental and subsegmental   branches right lower lobe and middle lobe.  Similar but lesser involvement in   the left upper and lower lobe.  Smaller non occluding emboli in the lobar   branches.  Compatible with scattered acute pulmonary emboli.  Moderate   thrombus load.  Mild right heart strain. Mediastinum: Cardiac size normal.  No lymphadenopathy.  Aorta normal caliber. Thyroid gland and esophagus grossly normal.     Lungs/pleura: Small amount of subsegmental atelectasis anterior basal left   lower lobe and adjacent lingula.  Some calcified nodules posteromedial right   lower lobe at the lung base.  No significant nodules or masses.  No pleural   effusion or pneumothorax.  Central airways unremarkable. Upper Abdomen: No acute process.  No suspicious mass. Soft Tissues/Bones: No acute bone or soft tissue abnormality.                    LABS:  Results for orders placed or performed during the hospital encounter of 04/25/22   CBC with Auto Differential   Result Value Ref Range    WBC 7.2 3.5 - 11.0 k/uL    RBC 5.32 (H) 4.0 - 5.2 m/uL    Hemoglobin 16.5 (H) 12.0 - 16.0 g/dL    Hematocrit 49.1 (H) 36 - 46 %    MCV 92.3 80 - 100 fL    MCH 30.9 26 - 34 pg    MCHC 33.5 31 - 37 g/dL    RDW 16.2 (H) 12.5 - 15.4 %    Platelets 634 760 - 692 k/uL    MPV 6.9 6.0 - 12.0 fL    Seg Neutrophils 58 36 - 66 %    Lymphocytes 27 24 - 44 %    Monocytes 13 (H) 2 - 11 %    Eosinophils % 1 1 - 4 %    Basophils 1 0 - 2 %    Segs Absolute 4.20 1.8 - 7.7 k/uL    Absolute Lymph # 1.90 1.0 - 4.8 k/uL    Absolute Mono # 0.90 0.1 - 1.2 k/uL    Absolute Eos # 0.10 0.0 - 0.4 k/uL    Basophils Absolute 0.10 0.0 - 0.2 k/uL   Basic Metabolic Panel   Result Value Ref Range    Glucose 112 (H) 70 - 99 mg/dL    BUN 19 8 - 23 mg/dL    CREATININE 0.71 0.50 - 0.90 mg/dL    Calcium 8.2 (L) 8.6 - 10.4 mg/dL    Sodium 134 (L) 135 - 144 mmol/L    Potassium 4.2 3.7 - 5.3 mmol/L    Chloride 94 (L) 98 - 107 mmol/L    CO2 28 20 - 31 mmol/L    Anion Gap 12 9 - 17 mmol/L    GFR Non-African American >60 >60 mL/min    GFR African American >60 >60 mL/min    GFR Comment         Troponin   Result Value Ref Range    Troponin, High Sensitivity <6 0 - 14 ng/L   Hepatic Function Panel   Result Value Ref Range    Albumin 3.0 (L) 3.5 - 5.2 g/dL    Alkaline Phosphatase 130 (H) 35 - 104 U/L    ALT 19 5 - 33 U/L    AST 28 <32 U/L    Total Bilirubin 1.72 (H) 0.3 - 1.2 mg/dL    Bilirubin, Direct 0.40 (H) <0.31 mg/dL    Bilirubin, Indirect 1.32 (H) 0.00 - 1.00 mg/dL    Total Protein 7.0 6.4 - 8.3 g/dL    Albumin/Globulin Ratio 0.8 (L) 1.0 - 2.5   Brain Natriuretic Peptide   Result Value Ref Range    Pro-BNP 78 <300 pg/mL           EMERGENCY DEPARTMENT COURSE:   Vitals:    Vitals:    04/25/22 0711 04/25/22 0745 04/25/22 0800 04/25/22 0815   BP: (!) 159/83 (!) 156/88 (!) 149/85 (!) 149/90   Pulse: 102  93 100   Resp: 18  17 19   Temp: 98.1 °F (36.7 °C)      TempSrc: Oral      SpO2: 94% 93% 92% 93%   Weight: 128.8 kg (284 lb)      Height: 5' 4\" (1.626 m)        -------------------------  BP: (!) 149/90, Temp: 98.1 °F (36.7 °C), Pulse: 100, Resp: 19      RE-EVALUATION:  The patient's ED stay the patient states that she felt some stuffiness denies any overt chest pain but states she just feels a little heaviness sensation in her chest so given that she has the known DVT we will get a CT of the chest we will get labs and an EKG    Lab work is essentially unremarkable CT did show that the patient has bilateral pulmonary emboli along with some right heart strain so she is being started on heparin at this point I do feel the patient warrants admission to hospital setting given the bilateral PE with right heart strain the patient is agreeable to this plan so will contact my hospitalist for admission    CONSULTS:  I spoke with my hospitalist is kind of to admit the patient to his service    PROCEDURES:  None    FINAL IMPRESSION      1. Bilateral pulmonary embolism ([address]    2. Acute deep vein thrombosis (DVT) of left lower extremity, unspecified vein (HCC)          DISPOSITION/PLAN   DISPOSITION        CONDITION ON DISPOSITION:   Stable    PATIENT REFERRED TO:  No follow-up provider specified. DISCHARGE MEDICATIONS:  New Prescriptions    No medications on file       (Please note that portions of this note were completed with a voicerecognition program.  Efforts were made to edit the dictations but occasionally words are mis-transcribed.)    Christian Fonseca MD,, MD, F.A.C.E.P.   Attending Emergency Medicine Physician       Christian Fonseca MD  04/25/22 2030

## 2022-04-25 NOTE — PROGRESS NOTES
Occupational [employer] [employer]  Occupational Therapy Not Seen Note    DATE: 2022    NAME: Cristal Bravo  MRN: [id number]   : 1960      Patient not seen this date for Occupational Therapy due to:      Patient is not appropriate for OT evaluation/treatment at this time d/t on heparin drip for found PE and DVT- started today at 9:45am.       Next Scheduled Treatment: Will check back 2022    Electronically signed by Rissa Castro OT on 2022 at 2:29 PM

## 2022-04-25 NOTE — CARE COORDINATION
Case Management Initial Discharge Plan  Stella Carrillo           Met with:patient to discuss discharge plans. Information verified: address, contacts, phone number, , insurance Yes  Insurance Provider: [facility]    Emergency Contact/Next of Kin name & number: Tamara Villalobos [phone] spouse  Who are involved in patient's support system? family    PCP: CONCHIS Diaz CNP  Date of last visit: 2022 per Epic    Discharge Planning    Living Arrangements:        Patient able to perform ADL's:Independent    Current Services (outpatient & in home) none  DME equipment: none  DME provider:     Is patient receiving oral anticoagulation therapy? No    Potential Assistance Needed:       Patient agreeable to home care: No  [place] of choice provided:  no    Prior SNF/Rehab Placement and Facility: no  Agreeable to SNF/Rehab: No  [place] of choice provided: no     Evaluation: no    Expected Discharge date:       Patient expects to be discharged to: If home: is the family and/or caregiver wiling & able to provide support at home? yes  Who will be providing this support? spouse    Follow Up Appointment: Best Day/ Time:      Transportation provider: self, spouse  Transportation arrangements needed for discharge: No    Readmission Risk              Risk of Unplanned Readmission:  0         Does patient have a readmission risk score greater than 14?: No  If yes, follow-up appointment must be made within 7 days of discharge.      Goals of Care: anti-coagulation planning      Educated patient on transitional options, provided freedom of choice and are agreeable with plan      Discharge Plan: home with spouse - per notes plan for Eliquis at d/c. Provided patient assistance cards to patient          Electronically signed by Jayce Dunn RN on 22 at 2:14 PM EDT

## 2022-04-26 VITALS
SYSTOLIC BLOOD PRESSURE: 131 MMHG | WEIGHT: 272.93 LBS | DIASTOLIC BLOOD PRESSURE: 69 MMHG | BODY MASS INDEX: 46.6 KG/M2 | HEART RATE: 90 BPM | OXYGEN SATURATION: 96 % | RESPIRATION RATE: 16 BRPM | HEIGHT: 64 IN | TEMPERATURE: 98.6 F

## 2022-04-26 LAB
ABSOLUTE EOS #: 0.2 K/UL (ref 0–0.4)
ABSOLUTE LYMPH #: 2.7 K/UL (ref 1–4.8)
ABSOLUTE MONO #: 1 K/UL (ref 0.1–1.2)
ANION GAP SERPL CALCULATED.3IONS-SCNC: 11 MMOL/L (ref 9–17)
BASOPHILS # BLD: 2 % (ref 0–2)
BASOPHILS ABSOLUTE: 0.1 K/UL (ref 0–0.2)
BUN BLDV-MCNC: 14 MG/DL (ref 8–23)
CALCIUM SERPL-MCNC: 7.9 MG/DL (ref 8.6–10.4)
CHLORIDE BLD-SCNC: 96 MMOL/L (ref 98–107)
CO2: 29 MMOL/L (ref 20–31)
CREAT SERPL-MCNC: 0.73 MG/DL (ref 0.5–0.9)
EKG ATRIAL RATE: 96 BPM
EKG P AXIS: 25 DEGREES
EKG P-R INTERVAL: 182 MS
EKG Q-T INTERVAL: 350 MS
EKG QRS DURATION: 74 MS
EKG QTC CALCULATION (BAZETT): 442 MS
EKG R AXIS: -14 DEGREES
EKG T AXIS: 11 DEGREES
EKG VENTRICULAR RATE: 96 BPM
EOSINOPHILS RELATIVE PERCENT: 3 % (ref 1–4)
GFR AFRICAN AMERICAN: >60 ML/MIN
GFR NON-AFRICAN AMERICAN: >60 ML/MIN
GFR SERPL CREATININE-BSD FRML MDRD: ABNORMAL ML/MIN/{1.73_M2}
GLUCOSE BLD-MCNC: 109 MG/DL (ref 70–99)
HCT VFR BLD CALC: 47.1 % (ref 36–46)
HEMOGLOBIN: 15.7 G/DL (ref 12–16)
LYMPHOCYTES # BLD: 40 % (ref 24–44)
MCH RBC QN AUTO: 31.2 PG (ref 26–34)
MCHC RBC AUTO-ENTMCNC: 33.3 G/DL (ref 31–37)
MCV RBC AUTO: 93.8 FL (ref 80–100)
MONOCYTES # BLD: 14 % (ref 2–11)
PARTIAL THROMBOPLASTIN TIME: 48.3 SEC (ref 21.3–31.3)
PARTIAL THROMBOPLASTIN TIME: 77.5 SEC (ref 21.3–31.3)
PDW BLD-RTO: 16.2 % (ref 12.5–15.4)
PLATELET # BLD: 384 K/UL (ref 140–450)
PMV BLD AUTO: 6.9 FL (ref 6–12)
POTASSIUM SERPL-SCNC: 3.8 MMOL/L (ref 3.7–5.3)
RBC # BLD: 5.02 M/UL (ref 4–5.2)
SEG NEUTROPHILS: 41 % (ref 36–66)
SEGMENTED NEUTROPHILS ABSOLUTE COUNT: 2.7 K/UL (ref 1.8–7.7)
SODIUM BLD-SCNC: 136 MMOL/L (ref 135–144)
WBC # BLD: 6.7 K/UL (ref 3.5–11)

## 2022-04-26 PROCEDURE — 6360000002 HC RX W HCPCS: Performed by: STUDENT IN AN ORGANIZED HEALTH CARE EDUCATION/TRAINING PROGRAM

## 2022-04-26 PROCEDURE — 96366 THER/PROPH/DIAG IV INF ADDON: CPT

## 2022-04-26 PROCEDURE — 99225 PR SBSQ OBSERVATION CARE/DAY 25 MINUTES: CPT | Performed by: HOSPITALIST

## 2022-04-26 PROCEDURE — G0378 HOSPITAL OBSERVATION PER HR: HCPCS

## 2022-04-26 PROCEDURE — 85730 THROMBOPLASTIN TIME PARTIAL: CPT

## 2022-04-26 PROCEDURE — 80048 BASIC METABOLIC PNL TOTAL CA: CPT

## 2022-04-26 PROCEDURE — 96376 TX/PRO/DX INJ SAME DRUG ADON: CPT

## 2022-04-26 PROCEDURE — 6370000000 HC RX 637 (ALT 250 FOR IP): Performed by: STUDENT IN AN ORGANIZED HEALTH CARE EDUCATION/TRAINING PROGRAM

## 2022-04-26 PROCEDURE — 36415 COLL VENOUS BLD VENIPUNCTURE: CPT

## 2022-04-26 PROCEDURE — 6370000000 HC RX 637 (ALT 250 FOR IP): Performed by: HOSPITALIST

## 2022-04-26 PROCEDURE — 85025 COMPLETE CBC W/AUTO DIFF WBC: CPT

## 2022-04-26 PROCEDURE — 2580000003 HC RX 258: Performed by: STUDENT IN AN ORGANIZED HEALTH CARE EDUCATION/TRAINING PROGRAM

## 2022-04-26 RX ADMIN — APIXABAN 10 MG: 5 TABLET, FILM COATED ORAL at 11:55

## 2022-04-26 RX ADMIN — HEPARIN SODIUM 4840 UNITS: 1000 INJECTION INTRAVENOUS; SUBCUTANEOUS at 00:50

## 2022-04-26 RX ADMIN — HYDROCHLOROTHIAZIDE 12.5 MG: 25 TABLET ORAL at 07:59

## 2022-04-26 RX ADMIN — Medication 16.3 UNITS/KG/HR: at 00:52

## 2022-04-26 RX ADMIN — SODIUM CHLORIDE, PRESERVATIVE FREE 10 ML: 5 INJECTION INTRAVENOUS at 07:59

## 2022-04-26 RX ADMIN — LISINOPRIL 10 MG: 10 TABLET ORAL at 07:59

## 2022-04-26 RX ADMIN — CITALOPRAM HYDROBROMIDE 10 MG: 20 TABLET, FILM COATED ORAL at 07:59

## 2022-04-26 ASSESSMENT — PAIN SCALES - GENERAL
PAINLEVEL_OUTOF10: 0
PAINLEVEL_OUTOF10: 0

## 2022-04-26 NOTE — PLAN OF CARE
Problem: Pain  Goal: Verbalizes/displays adequate comfort level or baseline comfort level  4/26/2022 1359 by Ritu Rodriguez RN  Outcome: Not Progressing  4/26/2022 0231 by Stephen Lara RN  Outcome: Progressing   Pain scale preformed per protocol and pt treated for pain as documented. Education given. Problem: Skin/Tissue Integrity  Goal: Absence of new skin breakdown  4/26/2022 1359 by Ritu Rodriguez RN  Outcome: Not Progressing   Skin assessment preformed. Pt turned every 2 hours with heals elevated off bed. Waffle mattress in place. Will continue to monitor.

## 2022-04-26 NOTE — PROGRESS NOTES
CLINICAL PHARMACY NOTE: MEDS TO BEDS    Total # of Prescriptions Filled: 1   The following medications were delivered to the patient:  · eliquis starter    Additional Documentation:

## 2022-04-26 NOTE — DISCHARGE INSTR - COC
Continuity of Care Form    Patient Name: Rafael Baker   :  1960  MRN:  [id number]    Admit date:  2022  Discharge date:  ***    Code Status Order: Full Code   Advance Directives:      Admitting Physician:  No admitting provider for patient encounter.   PCP: CONCHIS Hardy CNP    Discharging Nurse: [facility] Unit/Room#: 813/288-58  Discharging Unit Phone Number: ***    Emergency Contact:   Extended Emergency Contact Information  Primary Emergency Contact: Fidel Clemente  Address: NA  Home Phone: [phone]  Relation: Child  Secondary Emergency Contact: Hunter Roof  Home Phone: [phone]  Relation: Spouse    Past Surgical History:  Past Surgical History:   Procedure Laterality Date    CHOLECYSTECTOMY      [address] Right 1987       Immunization History:   Immunization History   Administered Date(s) Administered    COVID-19, [address] top, DO NOT Dilute, Bill-Sucrose, 12+ yrs, PF, 30 mcg/0.3 mL dose 2022    COVID-19, Pfizer Purple top, DILUTE for use, 12+ yrs, 30mcg/0.3mL dose 2021, 2021    Influenza 2013    Influenza Virus Vaccine 10/22/2014, 2015       Active Problems:  Patient Active Problem List   Diagnosis Code    Hypertension I10    Hyperlipidemia E78.5    History of depression Z86.59    Vision abnormalities H53.9    History of spontaneous  Z87.59    Anemia D64.9    Nausea vomiting and diarrhea R11.2, R19.7    Hypokalemia E87.6    Bilateral pulmonary embolism (HCC) I26.99    DVT (deep venous thrombosis) ([facility] I82.409    Morbid obesity ([facility] E66.01       Isolation/Infection:   Isolation            No Isolation          Patient Infection Status       Infection Onset Added Last Indicated Last Indicated By Review Planned Expiration Resolved Resolved By    None active    Resolved    C-diff Rule Out 22 Gastrointestinal Panel, Molecular (Ordered)   22 [facility] RESHMA [place]            Nurse Assessment:  Last Vital Signs: /71   Pulse 93   Temp 98.2 °F (36.8 °C) (Oral)   Resp 16   Ht 5' 4\" (1.626 m)   Wt 272 lb 14.9 oz (123.8 kg)   LMP 10/01/2013   SpO2 95%   BMI 46.85 kg/m²     Last documented pain score (0-10 scale): Pain Level: 0  Last Weight:   Wt Readings from Last 1 Encounters:   04/26/22 272 lb 14.9 oz (123.8 kg)     Mental Status:  {IP PT MENTAL STATUS:20030}    IV Access:  { RODRI IV ACCESS:934429817}    Nursing Mobility/ADLs:  Walking   {CHP DME HACK:379919640}  Transfer  {CHP DME GEPM:028216581}  Bathing  {CHP DME DJWN:893991036}  Dressing  {CHP DME QLQP:138132754}  Toileting  {CHP DME ZLTR:074061345}  Feeding  {CHP DME XJFJ:052002469}  Med Admin  {CHP DME RXZX:419939588}  Med Delivery   { RODRI MED Delivery:969311401}    Wound Care Documentation and Therapy:        Elimination:  Continence: Bowel: {YES / PULLIAM:92314}  Bladder: {YES / UT:86594}  Urinary Catheter: {Urinary Catheter:240292399}   Colostomy/Ileostomy/Ileal Conduit: {YES / LM:49451}       Date of Last BM: ***    Intake/Output Summary (Last 24 hours) at 4/26/2022 1205  Last data filed at 4/26/2022 0758  Gross per 24 hour   Intake 650 ml   Output 1150 ml   Net -500 ml     I/O last 3 completed shifts:   In: 300 [P.O.:300]  Out: 800 [Urine:800]    Safety Concerns:     [address] [employer] Safety Concerns:540058290}    Impairments/Disabilities:      [address] [employer] Impairments/Disabilities:351244562}    Nutrition Therapy:  Current Nutrition Therapy:   [address] [employer] Diet List:070723241}    Routes of Feeding: {CHP DME Other Feedings:168695672}  Liquids: {Slp liquid thickness:70871}  Daily Fluid Restriction: {CHP DME Yes amt example:956853553}  Last Modified Barium Swallow with Video (Video Swallowing Test): {Done Not Done SBNY:575590249}    Treatments at the Time of Hospital Discharge:   Respiratory Treatments: ***  Oxygen Therapy:  {Therapy; copd oxygen:18086}  Ventilator:    {MH CC Vent VVFH:201319934}    Rehab Therapies: {THERAPEUTIC INTERVENTION:1093247210}  Weight Bearing Status/Restrictions: {[facility] Weight Bearin}  Other Medical Equipment (for information only, NOT a DME order):  {EQUIPMENT:745987950}  Other Treatments: ***    Patient's personal belongings (please select all that are sent with patient):  {[facility] DME Belongings:216210315}    RN SIGNATURE:  {Esignature:229378784}    CASE MANAGEMENT/SOCIAL WORK SECTION    Inpatient Status Date: ***    Readmission Risk Assessment Score:  Readmission Risk              Risk of Unplanned Readmission:  0           Discharging to Facility/ Agency   Name:   Address:  Phone:  Fax:    Dialysis Facility (if applicable)   Name:  Address:  Dialysis Schedule:  Phone:  Fax:    / signature: {Esignature:234069139}    PHYSICIAN SECTION    Prognosis: {Prognosis:9928130907}    Condition at Discharge: [address] [place] [address] Patient Condition:894980706}    Rehab Potential (if transferring to Rehab): {Prognosis:5784477060}    Recommended Labs or Other Treatments After Discharge: ***    Physician Certification: I certify the above information and transfer of Antonietta How  is necessary for the continuing treatment of the diagnosis listed and that she requires {Admit to Appropriate Level of Care:56320} for {GREATER/LESS:566273287} 30 days.      Update Admission H&P: {CHP DME Changes in UENX:283163045}    PHYSICIAN SIGNATURE:  {Esignature:681275447}

## 2022-04-26 NOTE — DISCHARGE SUMMARY
[facility]  Office: [address], DO, Usmansaima Hurst, DO, Diana Galarza, DO, Hiram Mirza Blood, DO, Selin Lema MD, Jerry Pandey MD, Clayton Del Rosario MD, Connie Reeder MD, Floridalma Vegas MD, Nolvia Casas MD, Aspen Juarez MD, Cayetano Martins, DO, Cecy Woods DO, Jadene Boast, MD,  Joe Hilton DO, Silvestre Rahman MD, Yadira Clinton MD, Benita Ramirez MD, Lorrie High DO, Erik Benavides MD, Kavya Johnson MD, Jackie Gould [facility], [facility], CNP, Myra Mendoza CNP, Allyssa Starkey, CNP, Moe Vu, CNP, Liane Mayers CNP, Elizabeth Allison PA-C, Karo Diaz, [facility], Yesenia Hutson, CNP, Marianne Lares, CNP, Tia Howe CNP, Dc Street, CNS, Amanda Hoang, [facility], Maria Elena Hunter, CNP, Sang Matos, CNP, Edwin Mireles, [facility]    Discharge Summary     Patient ID: Shantell Holland  :  1960   MRN: [id number]     ACCOUNT:  [de-identified]   Patient's PCP: CONCHIS Perez CNP  Admit Date: 2022   Discharge Date: 2022    Length of Stay: 0  Code Status:  Full Code  Admitting Physician: No admitting provider for patient encounter. Discharge Physician: Therese Vick DO     Active Discharge Diagnoses:     Hospital Problem Lists:  Principal Problem:    Bilateral pulmonary embolism ([facility] [address]  Active Problems:    DVT (deep venous thrombosis) ([facility] [address]    Morbid obesity ([facility] [address]    Hypertension    History of depression  Resolved Problems:    * No resolved hospital problems. *      Admission Condition:  fair     Discharged Condition: good    Hospital Stay:     Hospital Course:  Shantell Holland is a 65 y.o. female who was admitted for the management of   Bilateral pulmonary embolism ([facility] [address] , presented to ER with Leg Swelling (left) and Leg Pain (left)    This very pleasant 71-year-old female was recently and discharged from our facility with a viral gastroenteritis.   Unfortunately she was not moving particularly well and was relatively static at home. She noticed left lower extremity swelling. She presented to the hospital where imaging studies have shown bilateral pulmonary embolism in addition to left lower extremity DVT. Echocardiogram did not show any significant right-sided heart strain so she subsequently was placed on Eliquis. Recommendations are for 9 months of Eliquis. This was a provoked DVT and providing the patient is not static in the future it is unlikely this will occur again. The patient is discharged home in stable condition. Significant therapeutic interventions: As above    Significant Diagnostic Studies:   Labs / Micro:  CBC:   Lab Results   Component Value Date    WBC 6.7 04/26/2022    RBC 5.02 04/26/2022    RBC 4.61 10/17/2011    HGB 15.7 04/26/2022    HCT 47.1 04/26/2022    MCV 93.8 04/26/2022    MCH 31.2 04/26/2022    MCHC 33.3 04/26/2022    RDW 16.2 04/26/2022     04/26/2022     10/17/2011     BMP:    Lab Results   Component Value Date    GLUCOSE 109 04/26/2022    GLUCOSE 97 10/17/2011     04/26/2022    K 3.8 04/26/2022    CL 96 04/26/2022    CO2 29 04/26/2022    ANIONGAP 11 04/26/2022    BUN 14 04/26/2022    CREATININE 0.73 04/26/2022    BUNCRER NOT REPORTED 07/26/2016    CALCIUM 7.9 04/26/2022    LABGLOM >60 04/26/2022    GFRAA >60 04/26/2022    GFR      04/26/2022       Radiology:  CT CHEST PULMONARY EMBOLISM W CONTRAST    Result Date: 4/25/2022  1. Diffuse bilateral small filling defects mainly in segmental and subsegmental branches and minimal involvement of the lobar branches compatible with acute pulmonary emboli. Mild right heart strain. 2. Subsegmental atelectasis at the left lung base as discussed above. Findings were discussed with Dr. Ana Lei at 9:32 am on 4/25/2022. RECOMMENDATIONS: Unavailable     US DUP LOWER EXTREMITY LEFT JATIN    Result Date: 4/25/2022  Calf DVT in the posterior tibial and peroneal veins.  Findings discussed with Dr. Grullon Ohms from the ureters are not on 04/25/2022 at 8:30 a.m. Consultations:    Consults:     Final Specialist Recommendations/Findings:   None      The patient was seen and examined on day of discharge and this discharge summary is in conjunction with any daily progress note from day of discharge. Discharge plan:     Disposition: Home    Physician Follow Up:     Aliza Grey, APRN - CNP  [place] [address]  [phone]             Requiring Further Evaluation/Follow Up POST HOSPITALIZATION/Incidental Findings: Follow-up as instructed    Diet: regular diet    Activity: As tolerated    Instructions to Patient: None    Discharge Medications:      Medication List      START taking these medications    * apixaban 5 MG Tabs tablet  Commonly known as: ELIQUIS  Take 2 tablets by mouth 2 times daily for 14 doses     * apixaban 5 MG Tabs tablet  Commonly known as: Eliquis  Take 1 tablet by mouth 2 times daily     * apixaban 5 MG Tabs tablet  Commonly known as: ELIQUIS  Take 1 tablet by mouth 2 times daily for 21 days  Start taking on: May 3, 2022         * This list has 3 medication(s) that are the same as other medications prescribed for you. Read the directions carefully, and ask your doctor or other care provider to review them with you. CONTINUE taking these medications    citalopram 10 MG tablet  Commonly known as: CELEXA     hydroCHLOROthiazide 12.5 MG tablet  Commonly known as: HYDRODIURIL     lisinopril 10 MG tablet  Commonly known as: PRINIVIL;ZESTRIL     ondansetron 4 MG tablet  Commonly known as: Zofran  Take 1 tablet by mouth every 8 hours as needed for Nausea        Medication Instructions:     Take Eliquis 10mg twice daily for 7 days then 5mg twice daily thereafter          Where to Get Your Medications      These medications were sent to [facility] [address], [facility] [address] [facility], [address]    Phone: [phone]   · apixaban 5 MG Tabs tablet  · apixaban 5 MG Tabs tablet     These medications were sent to [address] [place] [address], [address] [place] [address]    Phone: [phone]   · apixaban 5 MG Tabs tablet         No discharge procedures on file. Time Spent on discharge is  20 mins in patient examination, evaluation, counseling as well as medication reconciliation, prescriptions for required medications, discharge plan and follow up. Electronically signed by   Daniel Nur DO  4/26/2022  1:19 PM      Thank you Dr. Edson Shah, APRN - CNP for the opportunity to be involved in this patient's care.

## 2022-04-26 NOTE — PROGRESS NOTES
[facility]  Office: [address], DO, Abdulazizninoska Howe, DO, Vivian Bacon, DO, Sav Abdul Blood, DO, Tri Choi MD, Zohreh Alvarenga MD, Maria C Montano MD, Jamari Bueno MD, Ellie Moses MD, Brady Morrell MD, Junior Cristina MD, Bobby Hilton, DO, Reyna Chin, DO, Leif Elliott MD,  Usha Miller, DO, Deborah Nguyen MD, Dot Martinez MD, Johnny Wheatley MD, Annalise Sheppard DO, Dorene Conklin MD, Jarvis Pierson MD, Isaac Cosby, [facility], [facility], [facility], Coelman Van, CNP, Shahida Bocanegra, CNP, Yuly Moreno, CNP, Ashok Palmer, CNP, Dedra Ng PA-C, John Montenegro, BERNA, Asuncion Celestin, CNP, Claudy Cuevas, CNP, Con Distance, CNP, Monique Hager, CNS, [facility], Belkis Cardoza, [facility], [place], CNP, Linda Grullon, [facility]   [facility]    Progress Note    4/26/2022    1:17 PM    Name:   Loretta Hodges  MRN:     [id number]     Acct:      [de-identified]   Room:   [facility]-01   Day:  0  Admit Date:  4/25/2022  7:08 AM    PCP:   CONCHIS Null CNP  Code Status:  Full Code    Subjective:     C/C:   Chief Complaint   Patient presents with    Leg Swelling     left    Leg Pain     left     Patient seen in follow-up for left lower extremity swelling and pain, acute bilateral pulmonary embolism. Patient states \"I feel better\"    Interval History Status: significantly improved. Patient is doing well overall. I had a very long discussion with her regarding the etiology behind DVT/PE. Unfortunately the patient has been relatively static leading to the development of her DVT/PE. This is a provoked DVT. At this point in time I do recommend treatment with Eliquis for minimum of 9 months. All questions answered. Brief History:     Is a very pleasant 61-year-old female who presents to the hospital with a left lower extremity swelling and has been found to have an acute DVT/PE.   Echocardiogram is unremarkable and does not show any right-sided heart strain. Review of Systems:     Constitutional:  negative for chills, fevers, sweats  Respiratory:  negative for cough, dyspnea on exertion, shortness of breath, wheezing  Cardiovascular:  negative for chest pain, chest pressure/discomfort, lower extremity edema, palpitations  Gastrointestinal:  negative for abdominal pain, constipation, diarrhea, nausea, vomiting  Neurological:  negative for dizziness, headache    Medications: Allergies: Allergies   Allergen Reactions    Codeine Nausea And Vomiting       Current Meds:   Scheduled Meds:    apixaban  10 mg Oral BID    Followed by   Lauren Kan ON 5/3/2022] apixaban  5 mg Oral BID    sodium chloride  80 mL IntraVENous Once    citalopram  10 mg Oral Daily    hydroCHLOROthiazide  12.5 mg Oral Daily    lisinopril  10 mg Oral Daily    sodium chloride flush  5-40 mL IntraVENous 2 times per day     Continuous Infusions:    sodium chloride Stopped (22 1158)     PRN Meds: sodium chloride flush, sodium chloride, ondansetron **OR** ondansetron, polyethylene glycol, acetaminophen **OR** acetaminophen, heparin (porcine), heparin (porcine), melatonin    Data:     Past Medical History:   has a past medical history of Anemia, History of depression, History of spontaneous , Hyperlipidemia, Hypertension, Morbid obesity ([address] and Vision abnormalities. Social History:   reports that she has never smoked. She has never used smokeless tobacco. She reports current alcohol use. She reports that she does not use drugs.      Family History:   Family History   Problem Relation Age of Onset    Heart Disease Father     Hypertension Mother     Heart Disease Mother     Breast Cancer Neg Hx     Cancer Neg Hx     Colon Cancer Neg Hx     Diabetes Neg Hx     Eclampsia Neg Hx     Ovarian Cancer Neg Hx      Labor Neg Hx     Spont Abortions Neg Hx     Stroke Neg Hx        Vitals:  /69   Pulse 90 Temp 98.6 °F (37 °C) (Oral)   Resp 16   Ht 5' 4\" (1.626 m)   Wt 272 lb 14.9 oz (123.8 kg)   LMP 10/01/2013   SpO2 96%   BMI 46.85 kg/m²   Temp (24hrs), Av.5 °F (36.9 °C), Min:98.2 °F (36.8 °C), Max:98.9 °F (37.2 °C)    No results for input(s): POCGLU in the last 72 hours. I/O (24Hr): Intake/Output Summary (Last 24 hours) at 2022 1317  Last data filed at 2022 0758  Gross per 24 hour   Intake 650 ml   Output 1150 ml   Net -500 ml       Labs:  Hematology:  Recent Labs     22  0836 22  0711   WBC 7.2 6.7   RBC 5.32* 5.02   HGB 16.5* 15.7   HCT 49.1* 47.1*   MCV 92.3 93.8   MCH 30.9 31.2   MCHC 33.5 33.3   RDW 16.2* 16.2*    384   MPV 6.9 6.9     Chemistry:  Recent Labs     22  0836 22  0711   * 136   K 4.2 3.8   CL 94* 96*   CO2 28 29   GLUCOSE 112* 109*   BUN 19 14   CREATININE 0.71 0.73   ANIONGAP 12 11   LABGLOM >60 >60   GFRAA >60 >60   CALCIUM 8.2* 7.9*   PROBNP 78  --    TROPHS <6  --      Recent Labs     22  0836   PROT 7.0   LABALBU 3.0*   AST 28   ALT 19   ALKPHOS 130*   BILITOT 1.72*   BILIDIR 0.40*     ABG:No results found for: POCPH, PHART, PH, POCPCO2, KZV5UUU, PCO2, POCPO2, PO2ART, PO2, POCHCO3, FWA3DJD, HCO3, NBEA, PBEA, BEART, BE, THGBART, THB, JFP9EQR, AZFN8IFZ, N9GUIYDE, O2SAT, FIO2  Lab Results   Component Value Date/Time    SPECIAL  2015 07:00 AM     RT HAND Performed at [address], [address] [place] [address]  [phone] 2015 07:00 AM     Lab Results   Component Value Date/Time    CULTURE NO GROWTH 6 DAYS 2015 07:00 AM    CULTURE  2015 07:00 AM     Performed at [address] [place] [address], [address] [place] [address] [phone]       Radiology:  CT CHEST PULMONARY EMBOLISM W CONTRAST    Result Date: 2022  1.  Diffuse bilateral small filling defects mainly in segmental and subsegmental branches and minimal involvement of the lobar branches compatible with acute pulmonary emboli. Mild right heart strain. 2. Subsegmental atelectasis at the left lung base as discussed above. Findings were discussed with Dr. Maximo Haider at 9:32 am on 4/25/2022. RECOMMENDATIONS: Unavailable     US DUP LOWER EXTREMITY LEFT JATIN    Result Date: 4/25/2022  Calf DVT in the posterior tibial and peroneal veins. Findings discussed with Dr. John Rodriguez from the ureters are not on 04/25/2022 at 8:30 a.m. Physical Examination:        General appearance:  alert, cooperative and no distress  Mental Status:  oriented to person, place and time and normal affect  Lungs:  clear to auscultation bilaterally, normal effort  Heart:  regular rate and rhythm, no murmur  Abdomen:  soft, nontender, nondistended, normal bowel sounds, no masses, hepatomegaly, splenomegaly  Extremities:  no edema, redness, tenderness in the calves  Skin:  no gross lesions, rashes, induration    Assessment:        Hospital Problems           Last Modified POA    * (Principal) Bilateral pulmonary embolism ([address] 4/25/2022 Yes    DVT (deep venous thrombosis) ([address] 4/25/2022 Yes    Morbid obesity ([address] 4/25/2022 Yes    Hypertension 4/25/2022 Yes    History of depression 4/25/2022 Yes          Plan:        1. DVT/PE  1. Transition to Eliquis  2. Essential hypertension  1.  Continue home medications    Discharge home    Steven Mcdermott DO  4/26/2022  1:17 PM

## 2022-04-26 NOTE — PLAN OF CARE
Problem: Discharge Planning  Goal: Discharge to home or other facility with appropriate resources  4/26/2022 0231 by Santos Medina RN  Outcome: Progressing     Problem: Pain  Goal: Verbalizes/displays adequate comfort level or baseline comfort level  4/26/2022 0231 by Santos Medina RN  Outcome: Progressing   PRN pain medications ordered (See Mar). Problem: Skin/Tissue Integrity  Goal: Absence of new skin breakdown  Description: 1. Monitor for areas of redness and/or skin breakdown  2. Assess vascular access sites hourly  3. Every 4-6 hours minimum:  Change oxygen saturation probe site  4. Every 4-6 hours:  If on nasal continuous positive airway pressure, respiratory therapy assess nares and determine need for appliance change or resting period.   4/26/2022 0231 by Santos Medina RN  Outcome: Progressing     Problem: ABCDS Injury Assessment  Goal: Absence of physical injury  4/26/2022 0231 by Santos Medina RN  Outcome: Progressing

## 2022-04-26 NOTE — PROGRESS NOTES
[address] [facility]  [address] [facility] [address]  Phone: [phone]        April 26, 2022     Patient: Cesario Lorenzo   YOB: 1960   Date of Visit: 4/252022       To Whom It May Concern: It is my medical opinion that Annamarie Sal may return to work Juan, May 1 2022. If you have any questions or concerns, please don't hesitate to call.     Sincerely,        Dr Carmen Benz

## 2022-04-26 NOTE — PROGRESS NOTES
Occupational [address]  Occupational Therapy Not Seen Note    DATE: 2022    NAME: Jackie Oliver  MRN: [id number]   : 1960      Patient not seen this date for Occupational Therapy due to:      Patient independent with ADLs and functional tasks with no acute OT needs per pt and RN report. Pt states no safety concerns for returning home at discharge. Will defer OT evaluation at this time. Please reorder OT if future needs arise.         Electronically signed by Carlene David OT on 2022 at 12:20 PM

## 2022-04-26 NOTE — PROGRESS NOTES
Physical Therapy         Physical Therapy Cancel Note      DATE: 2022    NAME: Elva Patel  MRN: [id number]   : 1960      Patient not seen this date for Physical Therapy due to:    Patient independent with functional mobility. Will defer PT evaluation at this time. Please reorder PT if future needs arise.        Electronically signed by Leyla Russ PT on 2022 at 12:30 PM

## 2022-04-27 ENCOUNTER — CARE COORDINATION (OUTPATIENT)
Dept: CASE MANAGEMENT | Age: 62
End: 2022-04-27

## 2022-04-27 NOTE — CARE COORDINATION
[facility] Transitions Initial Follow Up Call    Call within 2 business days of discharge: Yes    Patient: Johnny York Patient : 1960   MRN: [id number]  Reason for Admission: Leg pain  Discharge Date: 22 RARS: No data recorded    Last Discharge [facility]       Complaint Diagnosis Description Type Department Provider    22 Leg Swelling; Leg Pain Bilateral pulmonary embolism ([facility] . .. ED to Hosp-Admission (Discharged) (ADMITTED) [facility], DO; Danni Fear. .. # 1 attempt-LM  Unable to reach patient, left vm message with name and call back information, requested call back//JU    Facility: [facility]  Non-face-to-face services provided:  Transitions of Care Initial Call    Was this an external facility discharge? No Discharge [facility]    Challenges to be reviewed by the provider         CTN provided contact information. Plan for follow-up call in 1-2 days based on severity of symptoms and risk factors. Plan for next call: initial 24 hr follow up        Care Transitions 24 Hour Call    Do you have all of your prescriptions and are they filled?: Yes  Care Transitions Interventions         Follow Up  No future appointments.     Link Robles RN

## 2022-04-28 ENCOUNTER — CARE COORDINATION (OUTPATIENT)
Dept: CASE MANAGEMENT | Age: 62
End: 2022-04-28

## 2022-04-28 NOTE — CARE COORDINATION
[facility] Transitions Initial Follow Up Call    Call within 2 business days of discharge: Yes    Patient: Gautam Late Patient : 1960   MRN: [id number]  Reason for Admission: Leg swelling  Discharge Date: 22 RARS: No data recorded    Last Discharge [facility]       Complaint Diagnosis Description Type Department Provider    22 Leg Swelling; Leg Pain Bilateral pulmonary embolism ([facility] [address] . .. ED to Hosp-Admission (Discharged) (ADMITTED) [facility] Joel Vickers, DO; Emmett Friends. ..           # 2 attempt-LM  Unable to reach patient, left  message with name and call back information, requested call back//JU  2 unsuccessful attempts to reach patient, care transitions episode resolved/non [facility]    [facility]ility: [facility]    Non-face-to-face services provided:      Care Transitions 24 Hour Call    Do you have all of your prescriptions and are they filled?: Yes  Care Transitions Interventions         Follow Up  No future appointments.     Terrie Forbes RN

## 2022-05-09 ENCOUNTER — HOSPITAL ENCOUNTER (EMERGENCY)
Age: 62
Discharge: HOME | End: 2022-05-09
Attending: EMERGENCY MEDICINE
Payer: COMMERCIAL

## 2022-05-09 ENCOUNTER — APPOINTMENT (OUTPATIENT)
Dept: ULTRASOUND IMAGING | Age: 62
End: 2022-05-09
Payer: COMMERCIAL

## 2022-05-09 ENCOUNTER — APPOINTMENT (OUTPATIENT)
Dept: CT IMAGING | Age: 62
End: 2022-05-09
Payer: COMMERCIAL

## 2022-05-09 VITALS
SYSTOLIC BLOOD PRESSURE: 129 MMHG | HEART RATE: 97 BPM | BODY MASS INDEX: 45.24 KG/M2 | WEIGHT: 265 LBS | HEIGHT: 64 IN | RESPIRATION RATE: 14 BRPM | OXYGEN SATURATION: 93 % | DIASTOLIC BLOOD PRESSURE: 97 MMHG | TEMPERATURE: 98.1 F

## 2022-05-09 DIAGNOSIS — I80.02 THROMBOPHLEBITIS OF SUPERFICIAL VEINS OF LEFT LOWER EXTREMITY: Primary | ICD-10-CM

## 2022-05-09 DIAGNOSIS — I82.452 DEEP VENOUS THROMBOSIS (DVT) OF LEFT PERONEAL VEIN, UNSPECIFIED CHRONICITY (HCC): ICD-10-CM

## 2022-05-09 DIAGNOSIS — I26.99 PULMONARY EMBOLISM WITHOUT ACUTE COR PULMONALE, UNSPECIFIED CHRONICITY, UNSPECIFIED PULMONARY EMBOLISM TYPE (HCC): ICD-10-CM

## 2022-05-09 LAB
ABSOLUTE EOS #: 0.3 K/UL (ref 0–0.4)
ABSOLUTE LYMPH #: 2.7 K/UL (ref 1–4.8)
ABSOLUTE MONO #: 0.7 K/UL (ref 0.1–1.2)
ALBUMIN SERPL-MCNC: 3.4 G/DL (ref 3.5–5.2)
ALBUMIN/GLOBULIN RATIO: 0.9 (ref 1–2.5)
ALP BLD-CCNC: 110 U/L (ref 35–104)
ALT SERPL-CCNC: 24 U/L (ref 5–33)
ANION GAP SERPL CALCULATED.3IONS-SCNC: 12 MMOL/L (ref 9–17)
AST SERPL-CCNC: 37 U/L
BASOPHILS # BLD: 2 % (ref 0–2)
BASOPHILS ABSOLUTE: 0.1 K/UL (ref 0–0.2)
BILIRUB SERPL-MCNC: 1.39 MG/DL (ref 0.3–1.2)
BUN BLDV-MCNC: 18 MG/DL (ref 8–23)
CALCIUM SERPL-MCNC: 8.3 MG/DL (ref 8.6–10.4)
CHLORIDE BLD-SCNC: 97 MMOL/L (ref 98–107)
CO2: 25 MMOL/L (ref 20–31)
CREAT SERPL-MCNC: 0.72 MG/DL (ref 0.5–0.9)
EOSINOPHILS RELATIVE PERCENT: 4 % (ref 1–4)
GFR AFRICAN AMERICAN: >60 ML/MIN
GFR NON-AFRICAN AMERICAN: >60 ML/MIN
GFR SERPL CREATININE-BSD FRML MDRD: ABNORMAL ML/MIN/{1.73_M2}
GLUCOSE BLD-MCNC: 123 MG/DL (ref 70–99)
HCT VFR BLD CALC: 46.3 % (ref 36–46)
HEMOGLOBIN: 15.8 G/DL (ref 12–16)
INR BLD: 1.1
LYMPHOCYTES # BLD: 38 % (ref 24–44)
MCH RBC QN AUTO: 31.9 PG (ref 26–34)
MCHC RBC AUTO-ENTMCNC: 34.2 G/DL (ref 31–37)
MCV RBC AUTO: 93.2 FL (ref 80–100)
MONOCYTES # BLD: 9 % (ref 2–11)
PARTIAL THROMBOPLASTIN TIME: 25.8 SEC (ref 21.3–31.3)
PDW BLD-RTO: 15.7 % (ref 12.5–15.4)
PLATELET # BLD: 303 K/UL (ref 140–450)
PMV BLD AUTO: 7.4 FL (ref 6–12)
POTASSIUM SERPL-SCNC: 3.9 MMOL/L (ref 3.7–5.3)
PRO-BNP: 171 PG/ML
PROTHROMBIN TIME: 11.6 SEC (ref 9.4–12.6)
RBC # BLD: 4.97 M/UL (ref 4–5.2)
SEG NEUTROPHILS: 47 % (ref 36–66)
SEGMENTED NEUTROPHILS ABSOLUTE COUNT: 3.4 K/UL (ref 1.8–7.7)
SODIUM BLD-SCNC: 134 MMOL/L (ref 135–144)
TOTAL PROTEIN: 7 G/DL (ref 6.4–8.3)
TROPONIN, HIGH SENSITIVITY: <6 NG/L (ref 0–14)
WBC # BLD: 7.1 K/UL (ref 3.5–11)

## 2022-05-09 PROCEDURE — 83880 ASSAY OF NATRIURETIC PEPTIDE: CPT

## 2022-05-09 PROCEDURE — 2580000003 HC RX 258: Performed by: EMERGENCY MEDICINE

## 2022-05-09 PROCEDURE — 6370000000 HC RX 637 (ALT 250 FOR IP): Performed by: EMERGENCY MEDICINE

## 2022-05-09 PROCEDURE — 36415 COLL VENOUS BLD VENIPUNCTURE: CPT

## 2022-05-09 PROCEDURE — 99285 EMERGENCY DEPT VISIT HI MDM: CPT

## 2022-05-09 PROCEDURE — 84484 ASSAY OF TROPONIN QUANT: CPT

## 2022-05-09 PROCEDURE — 71260 CT THORAX DX C+: CPT

## 2022-05-09 PROCEDURE — 80053 COMPREHEN METABOLIC PANEL: CPT

## 2022-05-09 PROCEDURE — 85610 PROTHROMBIN TIME: CPT

## 2022-05-09 PROCEDURE — 85730 THROMBOPLASTIN TIME PARTIAL: CPT

## 2022-05-09 PROCEDURE — 93971 EXTREMITY STUDY: CPT

## 2022-05-09 PROCEDURE — 6360000004 HC RX CONTRAST MEDICATION: Performed by: EMERGENCY MEDICINE

## 2022-05-09 PROCEDURE — 85025 COMPLETE CBC W/AUTO DIFF WBC: CPT

## 2022-05-09 PROCEDURE — 93005 ELECTROCARDIOGRAM TRACING: CPT | Performed by: EMERGENCY MEDICINE

## 2022-05-09 RX ORDER — OXYCODONE HYDROCHLORIDE AND ACETAMINOPHEN 5; 325 MG/1; MG/1
2 TABLET ORAL ONCE
Status: COMPLETED | OUTPATIENT
Start: 2022-05-09 | End: 2022-05-09

## 2022-05-09 RX ORDER — SODIUM CHLORIDE 0.9 % (FLUSH) 0.9 %
10 SYRINGE (ML) INJECTION PRN
Status: DISCONTINUED | OUTPATIENT
Start: 2022-05-09 | End: 2022-05-09 | Stop reason: HOSPADM

## 2022-05-09 RX ORDER — 0.9 % SODIUM CHLORIDE 0.9 %
80 INTRAVENOUS SOLUTION INTRAVENOUS ONCE
Status: DISCONTINUED | OUTPATIENT
Start: 2022-05-09 | End: 2022-05-09 | Stop reason: HOSPADM

## 2022-05-09 RX ADMIN — IOPAMIDOL 75 ML: 755 INJECTION, SOLUTION INTRAVENOUS at 06:55

## 2022-05-09 RX ADMIN — Medication 80 ML: at 06:55

## 2022-05-09 RX ADMIN — SODIUM CHLORIDE, PRESERVATIVE FREE 10 ML: 5 INJECTION INTRAVENOUS at 06:55

## 2022-05-09 RX ADMIN — OXYCODONE HYDROCHLORIDE AND ACETAMINOPHEN 2 TABLET: 5; 325 TABLET ORAL at 06:33

## 2022-05-09 ASSESSMENT — ENCOUNTER SYMPTOMS
SHORTNESS OF BREATH: 0
COUGH: 0
SORE THROAT: 0
VOMITING: 0
STRIDOR: 0
WHEEZING: 0
DIARRHEA: 0
CONSTIPATION: 0
EYE PAIN: 0
CHEST TIGHTNESS: 1
ABDOMINAL PAIN: 0
EYE REDNESS: 0
EYE DISCHARGE: 0
NAUSEA: 0
COLOR CHANGE: 0

## 2022-05-09 ASSESSMENT — PAIN DESCRIPTION - LOCATION
LOCATION: LEG
LOCATION: LEG

## 2022-05-09 ASSESSMENT — PAIN DESCRIPTION - ORIENTATION
ORIENTATION: LEFT
ORIENTATION: LEFT

## 2022-05-09 ASSESSMENT — PAIN - FUNCTIONAL ASSESSMENT
PAIN_FUNCTIONAL_ASSESSMENT: 0-10
PAIN_FUNCTIONAL_ASSESSMENT: NONE - DENIES PAIN

## 2022-05-09 ASSESSMENT — PAIN SCALES - GENERAL
PAINLEVEL_OUTOF10: 6
PAINLEVEL_OUTOF10: 6

## 2022-05-09 NOTE — ED NOTES
AVS reviewed with the patient. Discussed follow-up care, taking medications as prescribed, and when to call 911. VSS. All questions answered. IV Removed per order. Prescriptions sent to patient's pharmacy. Ambulatory out of the department with a steady, unassisted gait.         Momo Velasco RN  05/09/22 7231

## 2022-05-09 NOTE — ED PROVIDER NOTES
ATTENDING  ADDENDUM     Care of this patient was assumed from Dr. Daryl De Dios. The patient was seen for Leg Pain (hx of DVT, on eliquis. reports pain to left calf. patient sts pain woke er up out of her sleep.), Chest Pain, and Nausea  . The patient's initial evaluation and plan have been discussed with the prior provider who initially evaluated the patient. Nursing Notes, Past Medical Hx, Past Surgical Hx, Social Hx, Allergies, and Family Hx were all reviewed. ED COURSE      The patient was given the following medications:  Orders Placed This Encounter   Medications    oxyCODONE-acetaminophen (PERCOCET) 5-325 MG per tablet 2 tablet    iopamidol (ISOVUE-370) 76 % injection 75 mL    sodium chloride flush 0.9 % injection 10 mL    0.9 % sodium chloride bolus       RECENT VITALS:   Temp: 98.1 °F (36.7 °C), Pulse: 112, Resp: 20, BP: (!) 159/99    MEDICAL DECISION MAKING       28-year-old female presents complaining of right leg pain and swelling, and worsening shortness of breath. Patient has a known diagnosis of a DVT and pulmonary embolism, and is currently taking Eliquis. She is compliant with her anticoagulation. Imaging results documented below.     ECHO Complete 2D W Doppler W Color    Result Date: 4/26/2022  [facility] Transthoracic Echocardiography Report (TTE)  Patient Name Geoffrey Rutledge     Date of Study             04/25/2022               Josephmagdaleno Vickie   Date of      1960  Gender                    Female  Birth   Age          65 year(s)  Race                         Room Number         Height:                   64 inch, 162.56 cm   Corporate ID [id number]    Weight:                   267 pounds, 121.1 kg  #   Patient Acct [de-identified]   BSA:         2.21 m^2     BMI:       45.83 kg/m^2  #   MR #         [id number]     [facility], [facility]   Accession #  [id number]  Interpreting Physician    [address] [facility]   Fellow                   Referring Nurse                           Practitioner   Interpreting             Referring Physician       Savanah Palomino  Type of Study   TTE procedure:2D Echocardiogram, M-Mode, Doppler, Color Doppler. Procedure Date Date: 04/25/2022 Start: 01:57 PM Study Location: [facility] Technical Quality: Poor visualization due to body habitus. Indications:Pulmonary embolus. Patient Status: Inpatient Height: 64 inches Weight: 267.01 pounds BSA: 2.21 m^2 BMI: 45.83 kg/m^2 Rhythm: Within normal limits CONCLUSIONS Summary Poor image quality due to body habitus. Normal left ventricular size with normal hyperdynamic function. Left ventricular ejection fraction 62 %. Right ventricle appears normal. Signature ----------------------------------------------------------------------------  Electronically signed by Rukhsana Huitron RDCS(Sonographer) on 04/25/2022  03:25 PM ---------------------------------------------------------------------------- ----------------------------------------------------------------------------  Electronically signed by Eric Jordan(Interpreting physician) on 04/26/2022  11:10 AM ---------------------------------------------------------------------------- FINDINGS Left Atrium Left atrium is normal in size. Left Ventricle Normal left ventricular size with normal hyperdynamic function. Left ventricular ejection fraction 62 %. Right Atrium Right atrium is normal in size. Right Ventricle Right ventricle appears normal. Mitral Valve Normal mitral valve structure and function. Aortic Valve Normal aortic valve structure and function without stenosis or regurgitation. Tricuspid Valve Normal tricuspid valve structure and function. Trivial tricuspid regurgitation. Estimated right ventricular systolic pressure is 17 mmHg. Pulmonic Valve The pulmonic valve is normal in structure. Pericardial Effusion No pericardial effusion seen.  M-mode / 2D Measurements & Calculations:   LVIDd:4.6 cm(3.7 - 5.6 cm) Diastolic CMPEQE:90.9 ml  IHNNY:9.2 cm(2.2 - 4.0 cm)       Systolic OPYRWU:34.1 ml  IVSd:1 cm(0.6 - 1.1 cm)          Aortic Root:2.6 cm(2.0 - 3.7 cm)  LVPWd:0.9 cm(0.6 - 1.1 cm)       LA Dimension: 3.5 cm(1.9 - 4.0 cm)  Fractional Shortenin %       LA volume/Index: 33.8 ml /15m^2  Calculated LVEF (%): 62.14 %     LVOT:2 cm                                          Aortic                                          Peak Velocity: 1.48 m/s                                         Peak Gradient: 8.76 mmHg   Tricuspid:                             Pulmonic:   Peak TR Velocity: 1.90 m/s             Peak Velocity: 0.92 m/s  Peak TR Gradient: 14.44 mmHg           Peak Gradient: 3.41 mmHg  Estimated RA Pressure: 3 mmHg                                          Estimated PASP: 17.44 mmHg      CT ABDOMEN PELVIS W IV CONTRAST Additional Contrast? None    Result Date: 2022  EXAMINATION: CT OF THE ABDOMEN AND PELVIS WITH CONTRAST 2022 8:22 am TECHNIQUE: CT of the abdomen and pelvis was performed with the administration of intravenous contrast. Multiplanar reformatted images are provided for review. Dose modulation, iterative reconstruction, and/or weight based adjustment of the mA/kV was utilized to reduce the radiation dose to as low as reasonably achievable. COMPARISON: CT abdomen and pelvis performed 2016. HISTORY: ORDERING SYSTEM PROVIDED HISTORY: pain TECHNOLOGIST PROVIDED HISTORY: pain Decision Support Exception - unselect if not a suspected or confirmed emergency medical condition->Emergency Medical Condition (MA) Reason for Exam: Fever; Nausea & Vomiting; Diarrhea; Abdominal Pain FINDINGS: Lower Chest: The lung bases are without consolidation or effusion. The visualized cardiac structures are unremarkable. Organs: The liver and spleen are normal size and overall attenuation. There are splenic granulomas. The gallbladder has been removed. Pancreas and adrenal glands are unremarkable.   Kidneys are without obstructive uropathy. The ureters are not dilated. The urinary bladder is contracted. GI/Bowel: The stomach is contracted and otherwise unremarkable. Loops of small bowel are normal in caliber without evidence for obstruction. The colon contains air and fecal residue. There are uncomplicated diverticula. There is no free air or free fluid. Pelvis: The uterus is age-appropriate. Peritoneum/Retroperitoneum: The psoas muscles are symmetric. The abdominal aorta is normal in caliber. The inferior vena cava is unremarkable. There is no retroperitoneal or mesenteric adenopathy. Bones/Soft Tissues: There is diastasis of the abdominus rectus muscle with herniation of intra-abdominal fat in the midline throughout a majority of the abdomen. There is a midline hernia inferiorly containing abdominal fat and small bowel loops. There is no acute osseous abnormality. Diastasis of the abdominal structures muscle with fat containing midline hernias throughout a majority of the span of the abdomen. A hernia inferiorly contains fat and small bowel loops. Uncomplicated colonic diverticulosis. CT CHEST PULMONARY EMBOLISM W CONTRAST    Result Date: 5/9/2022  EXAMINATION: CTA OF THE CHEST 5/9/2022 6:42 am TECHNIQUE: CTA of the chest was performed after the administration of intravenous contrast.  Multiplanar reformatted images are provided for review. MIP images are provided for review. Dose modulation, iterative reconstruction, and/or weight based adjustment of the mA/kV was utilized to reduce the radiation dose to as low as reasonably achievable.  COMPARISON: 04/25/2022 HISTORY: ORDERING SYSTEM PROVIDED HISTORY: CP, recent history of PE TECHNOLOGIST PROVIDED HISTORY: CP, recent history of PE Decision Support Exception - unselect if not a suspected or confirmed emergency medical condition->Emergency Medical Condition (MA) Reason for Exam: Chest pain, PE FINDINGS: Pulmonary Arteries: Scattered small filling defects in segmental and subsegmental branches noted bilaterally most pronounced in the right lower lobe. Overall thrombus load is small. Similar but slightly more pronounced findings on the prior. No right heart strain. Pulmonary trunk normal size. Mediastinum: No evidence of mediastinal lymphadenopathy. The heart and pericardium demonstrate no acute abnormality. There is no acute abnormality of the thoracic aorta. Lungs/pleura: Minor patchy subsegmental atelectasis anterior basal left lower lobe and adjacent lingula slightly less compared to prior. No significant new nodule or mass. No pleural effusion or pneumothorax. Upper Abdomen: Small sliding hiatal hernia otherwise visualized stomach unremarkable. Soft Tissues/Bones: No acute bone or soft tissue abnormality. 1. Redemonstration of scattered bilateral acute pulmonary emboli mainly in segmental and subsegmental branches. Slightly less thrombus load compared to prior but overall thrombus load is small. No right heart strain whereas previously there was mild right heart strain. 2. Minimal subsegmental atelectasis anterior basal left lower lobe and adjacent inferior lingula is slightly decreased. 3. Small sliding hiatal hernia. RECOMMENDATIONS: Unavailable     CT CHEST PULMONARY EMBOLISM W CONTRAST    Result Date: 5/4/2022  EXAMINATION: CTA OF THE CHEST 4/25/2022 8:33 am TECHNIQUE: CTA of the chest was performed after the administration of intravenous contrast.  Multiplanar reformatted images are provided for review. MIP images are provided for review. Dose modulation, iterative reconstruction, and/or weight based adjustment of the mA/kV was utilized to reduce the radiation dose to as low as reasonably achievable.  COMPARISON: 02/17/2014 HISTORY: ORDERING SYSTEM PROVIDED HISTORY: chest pain / known dvt TECHNOLOGIST PROVIDED HISTORY: chest pain / known dvt Decision Support Exception - unselect if not a suspected or confirmed emergency medical condition->Emergency Medical Condition (MA) Reason for Exam: Pt c/o chest pain and leg swelling. Positive DVT on US today FINDINGS: Pulmonary Arteries: Scattered filling defects in segmental and subsegmental branches right lower lobe and middle lobe. Similar but lesser involvement in the left upper and lower lobe. Smaller non occluding emboli in the lobar branches. Compatible with scattered acute pulmonary emboli. Moderate thrombus load. Mild right heart strain. Mediastinum: Cardiac size normal.  No lymphadenopathy. Aorta normal caliber. Thyroid gland and esophagus grossly normal. Lungs/pleura: Small amount of subsegmental atelectasis anterior basal left lower lobe and adjacent lingula. Some calcified nodules posteromedial right lower lobe at the lung base. No significant nodules or masses. No pleural effusion or pneumothorax. Central airways unremarkable. Upper Abdomen: No acute process. No suspicious mass. Soft Tissues/Bones: No acute bone or soft tissue abnormality. 1. Diffuse bilateral small filling defects mainly in segmental and subsegmental branches and minimal involvement of the lobar branches compatible with acute pulmonary emboli. Mild right heart strain. 2. Subsegmental atelectasis at the left lung base as discussed above. Findings were discussed with Dr. Maximo Haider at 9:32 am on 4/25/2022. RECOMMENDATIONS: Unavailable ADDENDUM ADDENDUM FOR CODING PURPOSES REQUESTED BY : CALCIFIED NODULES DO NOT REQUIRE FOLLOW-UP. US DUP LOWER EXTREMITY LEFT JATIN    Result Date: 5/9/2022  EXAMINATION: DUPLEX VENOUS ULTRASOUND OF THE LEFT LOWER EXTREMITY 5/9/2022 8:33 am TECHNIQUE: Duplex ultrasound using B-mode/gray scaled imaging and Doppler spectral analysis and color flow was obtained of the deep venous structures of the left lower extremity. COMPARISON: Similar Doppler ultrasound study from 04/25/2022.  HISTORY: ORDERING SYSTEM PROVIDED HISTORY: Swollen, worsening pain, known recent history of DVT. TECHNOLOGIST PROVIDED HISTORY: Swollen, worsening pain, known recent history of DVT; presently anticoagulated. FINDINGS: The visualized veins of the left lower extremity deep thigh veins are again patent and free of echogenic thrombus. These veins demonstrate good compressibility with normal color flow study and spectral analysis. Echogenic thrombus re-identified in the posterior tibial and peroneal veins, similar to the previous study. Small amount of clot is also noted in the great saphenous vein. No definite acute DVT; similar calf DVT involving the posterior tibial and peroneal veins. Superficial great saphenous vein thrombosis also noted, possibly subacute/acute; correlate with exam.       Consults: Vascular surgery, Dr Odette Dickson. We reviewed the patient's testing and clinical presentation. He does not recommend changing her anticoagulation at this time.      Impression: DVT and pulmonary embolism, subsequent encounter  Superficial thrombophlebitis      Prasad Frederick MD  Emergency Medicine Attending       Prasad Frederick MD  05/09/22 8502

## 2022-05-09 NOTE — Clinical Note
Charu Gifford was seen and treated in our emergency department on 5/9/2022. She may return to work on 05/11/2022. If you have any questions or concerns, please don't hesitate to call.       Daryl Burgess MD

## 2022-05-09 NOTE — ED PROVIDER NOTES
[address] [facility] ED  EMERGENCY DEPARTMENT ENCOUNTER      Pt Name: Yoel Henson  MRN: [id number]  [place] 1960  Date of evaluation: 5/9/2022  Provider: Angela Biggs MD    [address] [place] [address]       Chief Complaint   Patient presents with    Leg Pain     hx of DVT, on eliquis. reports pain to left calf. patient sts pain woke er up out of her sleep.  Chest Pain    Nausea       HISTORY OF PRESENT ILLNESS  (Location/Symptom, Timing/Onset, Context/Setting, Quality, Duration, Modifying Factors, Severity.)   Yoel Henson is a 65 y.o. female who presents to the emergency department complaining of left leg pain and worsening swelling as well as chest pain and nausea. She relates that approximately 2 weeks ago she was here and diagnosed with DVT as well as blood clots in the lungs. She relates that she was swollen at that time. They did put her on Xarelto and she seemed to be getting better. Swelling seem to be going down. However this night the pain in her left calf is woke her up out of sleep and so she is concerned that perhaps the blood clot is getting larger again. She has been taking her medicine as prescribed. Nursing Notes were reviewed. REVIEW OF SYSTEMS    (2-9 systems for level 4, 10 or more for level 5)     Review of Systems   Constitutional: Negative for activity change, appetite change, chills, fatigue and fever. HENT: Negative for congestion, ear pain and sore throat. Eyes: Negative for pain, discharge and redness. Respiratory: Positive for chest tightness. Negative for cough, shortness of breath, wheezing and stridor. Cardiovascular: Negative for chest pain. Gastrointestinal: Negative for abdominal pain, constipation, diarrhea, nausea and vomiting. Genitourinary: Negative for decreased urine volume and difficulty urinating. Musculoskeletal: Negative for arthralgias and myalgias. Leg pain , left calf pain   Skin: Negative for color change and rash. Neurological: Negative for dizziness, weakness and headaches. Psychiatric/Behavioral: Negative for behavioral problems and confusion. Except as noted above the remainder of the review of systems was reviewed and negative. PAST MEDICAL HISTORY     Past Medical History:   Diagnosis Date    Anemia 10/23/2013    DVT (deep venous thrombosis) (HCC)     History of depression     History of spontaneous      Hyperlipidemia     Hypertension     Morbid obesity ([facility] [address] 2022    Vision abnormalities        SURGICAL HISTORY       Past Surgical History:   Procedure Laterality Date    CHOLECYSTECTOMY      COLONOSCOPY      Jaswinder Boots HERNIA REPAIR Right 1987       CURRENT MEDICATIONS       Discharge Medication List as of 2022  9:38 AM      CONTINUE these medications which have NOT CHANGED    Details   apixaban (ELIQUIS) 5 MG TABS tablet Take 1 tablet by mouth 2 times daily, Disp-60 tablet, R-5Normal      citalopram (CELEXA) 10 MG tablet TAKE 1 TABLET BY MOUTH EVERY DAYHistorical Med      lisinopril (PRINIVIL;ZESTRIL) 10 MG tablet TAKE 1 TABLET BY MOUTH EVERY DAYHistorical Med      hydroCHLOROthiazide (HYDRODIURIL) 12.5 MG tablet TAKE 1 TABLET BY MOUTH EVERY DAYHistorical Med             ALLERGIES     Codeine    FAMILY HISTORY           Problem Relation Age of Onset    Heart Disease Father     Hypertension Mother     Heart Disease Mother     Breast Cancer Neg Hx     Cancer Neg Hx     Colon Cancer Neg Hx     Diabetes Neg Hx     Eclampsia Neg Hx     Ovarian Cancer Neg Hx      Labor Neg Hx     Spont Abortions Neg Hx     Stroke Neg Hx      Family Status   Relation Name Status    Father   at age 68        heart disease    Mother  Alive    Neg Hx  (Not Specified)        SOCIAL HISTORY      reports that she has never smoked. She has never used smokeless tobacco. She reports current alcohol use. She reports that she does not use drugs.     PHYSICAL EXAM    (up to 7 for level 4, 8 or more for level 5)     Physical Exam  Vitals and nursing note reviewed. Constitutional:       General: She is not in acute distress. Appearance: She is well-developed. She is not ill-appearing, toxic-appearing or diaphoretic. HENT:      Head: Normocephalic and atraumatic. Right Ear: External ear normal.      Left Ear: External ear normal.      Nose: Nose normal.      Mouth/Throat:      Mouth: Mucous membranes are moist.   Eyes:      General:         Right eye: No discharge. Left eye: No discharge. Conjunctiva/sclera: Conjunctivae normal.      Pupils: Pupils are equal, round, and reactive to light. Cardiovascular:      Rate and Rhythm: Regular rhythm. Tachycardia present. Heart sounds: Normal heart sounds. No murmur heard. Pulmonary:      Effort: Pulmonary effort is normal. No respiratory distress. Breath sounds: Normal breath sounds. No wheezing, rhonchi or rales. Chest:      Chest wall: No tenderness. Abdominal:      General: Bowel sounds are normal. There is no distension. Palpations: Abdomen is soft. There is no mass. Tenderness: There is no abdominal tenderness. There is no guarding or rebound. Musculoskeletal:         General: Swelling present. No tenderness, deformity or signs of injury. Normal range of motion. Cervical back: Normal range of motion and neck supple. Right lower leg: No edema. Left lower leg: Edema present. Skin:     General: Skin is warm. Findings: No rash. Neurological:      General: No focal deficit present. Mental Status: She is alert and oriented to person, place, and time. Motor: No abnormal muscle tone.    Psychiatric:         Mood and Affect: Mood normal.         Behavior: Behavior normal.         DIAGNOSTIC RESULTS     EKG: All EKG's are interpreted by the Emergency Department Physician who either signs or Co-signs this chart in the absence of a cardiologist.    EKG Interpretation    Interpreted by me    Rhythm: Sinus tachycardia  Rate: Tachycardic  Axis: normal  Ectopy: none  Conduction: normal  ST Segments: no acute change  T Waves: no acute change  Q Waves: none  Low voltage QRS    Clinical Impression: Nonspecific changes and abnormal EKG    RADIOLOGY:   Non-plain film images such as CT, Ultrasound and MRI are read by the radiologist. Plain radiographic images are visualized and preliminarily interpreted by the emergency physician with the below findings:    Interpretation per the Radiologist below, if available at the time of this note:    US DUP LOWER EXTREMITY LEFT JATIN   Final Result   No definite acute DVT; similar calf DVT involving the posterior tibial and   peroneal veins. Superficial great saphenous vein thrombosis also now evident   as described, possibly subacute/acute; correlate with exam.         CT CHEST PULMONARY EMBOLISM W CONTRAST   Final Result   1. Redemonstration of scattered bilateral acute pulmonary emboli mainly in   segmental and subsegmental branches. Slightly less thrombus load compared to   prior but overall thrombus load is small. No right heart strain whereas   previously there was mild right heart strain. 2. Minimal subsegmental atelectasis anterior basal left lower lobe and   adjacent inferior lingula is slightly decreased. 3. Small sliding hiatal hernia.       RECOMMENDATIONS:   Unavailable               ED BEDSIDE ULTRASOUND:   Performed by ED Physician - none    LABS:  Labs Reviewed   CBC WITH AUTO DIFFERENTIAL - Abnormal; Notable for the following components:       Result Value    Hematocrit 46.3 (*)     RDW 15.7 (*)     All other components within normal limits   COMPREHENSIVE METABOLIC PANEL W/ REFLEX TO MG FOR LOW K - Abnormal; Notable for the following components:    Glucose 123 (*)     Calcium 8.3 (*)     Sodium 134 (*)     Chloride 97 (*)     Alkaline Phosphatase 110 (*)     AST 37 (*)     Total Bilirubin 1.39 (*)     Albumin 3.4 (*)     Albumin/Globulin Ratio 0.9 (*)     All other components within normal limits   TROPONIN   PROTIME-INR   APTT   BRAIN NATRIURETIC PEPTIDE       All other labs were within normal range or not returned as of this dictation. EMERGENCY DEPARTMENT COURSE and DIFFERENTIAL DIAGNOSIS/MDM:   Vitals:    Vitals:    05/09/22 0602 05/09/22 0938   BP: (!) 159/99 (!) 129/97   Pulse: 112 97   Resp: 20 14   Temp: 98.1 °F (36.7 °C)    TempSrc: Oral    SpO2: 99% 93%   Weight: 120.2 kg (265 lb)    Height: 5' 4\" (1.626 m)      I did relate to her that unfortunately I would have to go ahead and CT her and get another Doppler to verify any worsening or improvement for that matter. She is agreeable. I have answered any questions she has at this time. Of course I do worry about a blood clot enlarging. But with her on Xarelto this should be much less likely. Signed out to oncoming doctor for final disposition and impression. CONSULTS:  None    PROCEDURES:  None    FINAL IMPRESSION      1. Thrombophlebitis of superficial veins of left lower extremity    2.  Deep venous thrombosis (DVT) of left peroneal vein, unspecified chronicity (HCC)    3. Pulmonary embolism without acute cor pulmonale, unspecified chronicity, unspecified pulmonary embolism type ([facility] [address]          PATIENT REFERRED TO:  Marco Ospina, [address] [facility] [place]  [phone]    Schedule an appointment as soon as possible for a visit in 2 days        DISCHARGE MEDICATIONS:  Discharge Medication List as of 5/9/2022  9:38 AM          (Please note that portions of this note were completed with a voice recognition program.  Efforts were made to edit the dictations but occasionally words are mis-transcribed.)    Hilaria Van MD  Attending Emergency Physician        Hilaria Van MD  05/10/22 2028

## 2022-05-10 LAB
EKG ATRIAL RATE: 99 BPM
EKG P AXIS: 19 DEGREES
EKG P-R INTERVAL: 178 MS
EKG Q-T INTERVAL: 346 MS
EKG QRS DURATION: 82 MS
EKG QTC CALCULATION (BAZETT): 444 MS
EKG R AXIS: -12 DEGREES
EKG T AXIS: 9 DEGREES
EKG VENTRICULAR RATE: 99 BPM

## 2022-05-24 ENCOUNTER — HOSPITAL ENCOUNTER (EMERGENCY)
Age: 62
Discharge: HOME | End: 2022-05-24
Attending: EMERGENCY MEDICINE
Payer: COMMERCIAL

## 2022-05-24 ENCOUNTER — APPOINTMENT (OUTPATIENT)
Dept: ULTRASOUND IMAGING | Age: 62
End: 2022-05-24
Payer: COMMERCIAL

## 2022-05-24 ENCOUNTER — APPOINTMENT (OUTPATIENT)
Dept: CT IMAGING | Age: 62
End: 2022-05-24
Payer: COMMERCIAL

## 2022-05-24 VITALS
BODY MASS INDEX: 44.73 KG/M2 | RESPIRATION RATE: 18 BRPM | OXYGEN SATURATION: 96 % | HEIGHT: 64 IN | DIASTOLIC BLOOD PRESSURE: 88 MMHG | TEMPERATURE: 98.6 F | SYSTOLIC BLOOD PRESSURE: 138 MMHG | WEIGHT: 262 LBS | HEART RATE: 98 BPM

## 2022-05-24 DIAGNOSIS — R06.00 DYSPNEA, UNSPECIFIED TYPE: Primary | ICD-10-CM

## 2022-05-24 LAB
ABSOLUTE EOS #: 0.1 K/UL (ref 0–0.4)
ABSOLUTE LYMPH #: 2 K/UL (ref 1–4.8)
ABSOLUTE MONO #: 0.9 K/UL (ref 0.1–1.2)
ANION GAP SERPL CALCULATED.3IONS-SCNC: 12 MMOL/L (ref 9–17)
BASOPHILS # BLD: 0 % (ref 0–2)
BASOPHILS ABSOLUTE: 0 K/UL (ref 0–0.2)
BUN BLDV-MCNC: 14 MG/DL (ref 8–23)
CALCIUM SERPL-MCNC: 8.3 MG/DL (ref 8.6–10.4)
CHLORIDE BLD-SCNC: 94 MMOL/L (ref 98–107)
CO2: 26 MMOL/L (ref 20–31)
CREAT SERPL-MCNC: 0.96 MG/DL (ref 0.5–0.9)
EOSINOPHILS RELATIVE PERCENT: 1 % (ref 1–4)
GFR AFRICAN AMERICAN: >60 ML/MIN
GFR NON-AFRICAN AMERICAN: 59 ML/MIN
GFR SERPL CREATININE-BSD FRML MDRD: ABNORMAL ML/MIN/{1.73_M2}
GLUCOSE BLD-MCNC: 122 MG/DL (ref 70–99)
HCT VFR BLD CALC: 48.1 % (ref 36–46)
HEMOGLOBIN: 16.7 G/DL (ref 12–16)
LYMPHOCYTES # BLD: 21 % (ref 24–44)
MCH RBC QN AUTO: 32.6 PG (ref 26–34)
MCHC RBC AUTO-ENTMCNC: 34.6 G/DL (ref 31–37)
MCV RBC AUTO: 94.2 FL (ref 80–100)
MONOCYTES # BLD: 9 % (ref 2–11)
PDW BLD-RTO: 15.8 % (ref 12.5–15.4)
PLATELET # BLD: 335 K/UL (ref 140–450)
PMV BLD AUTO: 7 FL (ref 6–12)
POTASSIUM SERPL-SCNC: 4 MMOL/L (ref 3.7–5.3)
PRO-BNP: 238 PG/ML
RBC # BLD: 5.11 M/UL (ref 4–5.2)
SARS-COV-2, RAPID: NOT DETECTED
SEG NEUTROPHILS: 69 % (ref 36–66)
SEGMENTED NEUTROPHILS ABSOLUTE COUNT: 6.5 K/UL (ref 1.8–7.7)
SODIUM BLD-SCNC: 132 MMOL/L (ref 135–144)
SPECIMEN DESCRIPTION: NORMAL
TROPONIN, HIGH SENSITIVITY: <6 NG/L (ref 0–14)
WBC # BLD: 9.6 K/UL (ref 3.5–11)

## 2022-05-24 PROCEDURE — 87635 SARS-COV-2 COVID-19 AMP PRB: CPT

## 2022-05-24 PROCEDURE — 6360000002 HC RX W HCPCS

## 2022-05-24 PROCEDURE — 85025 COMPLETE CBC W/AUTO DIFF WBC: CPT

## 2022-05-24 PROCEDURE — 84484 ASSAY OF TROPONIN QUANT: CPT

## 2022-05-24 PROCEDURE — 2580000003 HC RX 258: Performed by: EMERGENCY MEDICINE

## 2022-05-24 PROCEDURE — 93971 EXTREMITY STUDY: CPT

## 2022-05-24 PROCEDURE — 96374 THER/PROPH/DIAG INJ IV PUSH: CPT

## 2022-05-24 PROCEDURE — 99285 EMERGENCY DEPT VISIT HI MDM: CPT

## 2022-05-24 PROCEDURE — 6360000004 HC RX CONTRAST MEDICATION: Performed by: EMERGENCY MEDICINE

## 2022-05-24 PROCEDURE — 93005 ELECTROCARDIOGRAM TRACING: CPT | Performed by: EMERGENCY MEDICINE

## 2022-05-24 PROCEDURE — 71260 CT THORAX DX C+: CPT

## 2022-05-24 PROCEDURE — 80048 BASIC METABOLIC PNL TOTAL CA: CPT

## 2022-05-24 PROCEDURE — 83880 ASSAY OF NATRIURETIC PEPTIDE: CPT

## 2022-05-24 RX ORDER — 0.9 % SODIUM CHLORIDE 0.9 %
80 INTRAVENOUS SOLUTION INTRAVENOUS ONCE
Status: DISCONTINUED | OUTPATIENT
Start: 2022-05-24 | End: 2022-05-24 | Stop reason: HOSPADM

## 2022-05-24 RX ORDER — ONDANSETRON 2 MG/ML
4 INJECTION INTRAMUSCULAR; INTRAVENOUS ONCE
Status: COMPLETED | OUTPATIENT
Start: 2022-05-24 | End: 2022-05-24

## 2022-05-24 RX ORDER — ONDANSETRON 4 MG/1
4 TABLET, FILM COATED ORAL EVERY 8 HOURS PRN
COMMUNITY

## 2022-05-24 RX ORDER — SODIUM CHLORIDE 0.9 % (FLUSH) 0.9 %
10 SYRINGE (ML) INJECTION PRN
Status: DISCONTINUED | OUTPATIENT
Start: 2022-05-24 | End: 2022-05-24 | Stop reason: HOSPADM

## 2022-05-24 RX ORDER — ONDANSETRON 2 MG/ML
INJECTION INTRAMUSCULAR; INTRAVENOUS
Status: COMPLETED
Start: 2022-05-24 | End: 2022-05-24

## 2022-05-24 RX ADMIN — SODIUM CHLORIDE, PRESERVATIVE FREE 10 ML: 5 INJECTION INTRAVENOUS at 04:39

## 2022-05-24 RX ADMIN — IOPAMIDOL 75 ML: 755 INJECTION, SOLUTION INTRAVENOUS at 04:39

## 2022-05-24 RX ADMIN — ONDANSETRON 4 MG: 2 INJECTION INTRAMUSCULAR; INTRAVENOUS at 04:29

## 2022-05-24 RX ADMIN — Medication 80 ML: at 04:38

## 2022-05-24 ASSESSMENT — PAIN - FUNCTIONAL ASSESSMENT
PAIN_FUNCTIONAL_ASSESSMENT: 0-10
PAIN_FUNCTIONAL_ASSESSMENT: NONE - DENIES PAIN
PAIN_FUNCTIONAL_ASSESSMENT: PREVENTS OR INTERFERES SOME ACTIVE ACTIVITIES AND ADLS

## 2022-05-24 ASSESSMENT — PAIN DESCRIPTION - DESCRIPTORS: DESCRIPTORS: ACHING

## 2022-05-24 ASSESSMENT — PAIN SCALES - GENERAL: PAINLEVEL_OUTOF10: 5

## 2022-05-24 ASSESSMENT — PAIN DESCRIPTION - ORIENTATION: ORIENTATION: LEFT

## 2022-05-24 ASSESSMENT — PAIN DESCRIPTION - LOCATION: LOCATION: LEG

## 2022-05-24 NOTE — Clinical Note
Philippe Patel was seen and treated in our emergency department on 5/24/2022. She may return to work on 05/26/2022. If you have any questions or concerns, please don't hesitate to call.       Robby Pelayo, DO

## 2022-05-24 NOTE — ED PROVIDER NOTES
[id number] [facility]  [address] [facility] [place] [address]  Phone: [phone]  Fax: [phone]        ADDENDUM:      Care of this patient was assumed from Dr. Curt Carbajal. The patient was seen for Shortness of Breath  . The patient's initial evaluation and plan have been discussed with the prior provider who initially evaluated the patient. Nursing Notes, Past Medical Hx, Past Surgical Hx, Allergies, were all reviewed. PAST MEDICAL HISTORY    has a past medical history of Anemia, DVT (deep venous thrombosis) ([facility] [address] History of depression, History of spontaneous , Hyperlipidemia, Hypertension, Morbid obesity ([facility] [address] and Vision abnormalities. SURGICAL HISTORY      has a past surgical history that includes Cholecystectomy (); hernia repair (Right, ); and Colonoscopy. CURRENT MEDICATIONS       Previous Medications    APIXABAN (ELIQUIS) 5 MG TABS TABLET    Take 1 tablet by mouth 2 times daily    CITALOPRAM (CELEXA) 10 MG TABLET    TAKE 1 TABLET BY MOUTH EVERY DAY    HYDROCHLOROTHIAZIDE (HYDRODIURIL) 12.5 MG TABLET    TAKE 1 TABLET BY MOUTH EVERY DAY    LISINOPRIL (PRINIVIL;ZESTRIL) 10 MG TABLET    TAKE 1 TABLET BY MOUTH EVERY DAY    ONDANSETRON (ZOFRAN) 4 MG TABLET    Take 4 mg by mouth every 8 hours as needed for Nausea or Vomiting       ALLERGIES     is allergic to codeine. Diagnostic Results     EKG: All EKG's are interpreted by the Emergency Department Physician who either signs or Co-signs this chart in the [address] a cardiologist.      Interpreted by Geanie Kehr, DO     Rhythm: Sinus tachycardia  Rate: 109  Axis: -26  Ectopy: none  Conduction: normal  ST Segments: no acute change  T Waves: no acute change  Q Waves: none    Clinical Impression: normal sinus rhythm with no acute changes/normal EKG. No acute infarction/ischemia noted.       RADIOLOGY:        Interpretation per the Radiologist below, if available at the time of this note:    [facility] [address] LEFT JATIN (Final result)  Result time 05/24/22 09:29:06  Final result by Yris Fam MD (05/24/22 09:29:06)                Impression:    No evidence of DVT in the left lower extremity, limited for below-knee   assessment.  Superficial venous thrombosis, as above. RECOMMENDATIONS:   Unavailable             Narrative:    EXAMINATION:   DUPLEX VENOUS ULTRASOUND OF THE LEFT LOWER EXTREMITY     5/24/2022 8:48 am     TECHNIQUE:   Duplex ultrasound using B-mode/gray scaled imaging and Doppler spectral   analysis and color flow was obtained of the deep venous structures of the   left lower extremity. COMPARISON:   None. HISTORY:   ORDERING SYSTEM PROVIDED HISTORY: pain   TECHNOLOGIST PROVIDED HISTORY:   pain     FINDINGS:   The visualized veins of the left lower extremity are patent and free of   echogenic thrombus, with good compressibility, normal color flow and   unremarkable spectral analysis.  There is limited visualization of the   posterior tibial vein, and the peroneal vein is nonvisualized. Echogenic clot identified in the superficial veins at the level of the   popliteal fossa.                         CT CHEST PULMONARY EMBOLISM W CONTRAST (Final result)  Result time 05/24/22 05:45:25  Final result by Mima Guardado (05/24/22 05:45:25)                Impression:    No evidence of pulmonary embolism or acute pulmonary abnormality however the   pulmonary arteries are suboptimally opacified. Evidence of old granulomatous disease. Tiny hiatal hernia. There appears to have been prior partial hepatectomy. Narrative:    EXAMINATION:   CTA OF THE CHEST 5/24/2022 4:15 am     TECHNIQUE:   CTA of the chest was performed after the administration of intravenous   contrast.  Multiplanar reformatted images are provided for review.  MIP   images are provided for review.  Automated exposure control, iterative   reconstruction, and/or weight based adjustment of the mA/kV was utilized to reduce the radiation dose to as low as reasonably achievable. COMPARISON:   05/09/2022     HISTORY:   ORDERING SYSTEM PROVIDED HISTORY: Shortness of breath chest pain   TECHNOLOGIST PROVIDED HISTORY:   Shortness of breath chest pain   Decision Support Exception - unselect if not a suspected or confirmed   emergency medical condition->Emergency Medical Condition (MA)   Reason for Exam: Shortness of breath chest pain   Relevant Medical/Surgical History: hx of pe     FINDINGS:   Pulmonary Arteries: Pulmonary arteries are suboptimally opacified somewhat   limiting evaluation.  No evidence of intraluminal filling defect to suggest   pulmonary embolism.  Main pulmonary artery is normal in caliber. Mediastinum: No evidence of mediastinal lymphadenopathy.  The heart and   pericardium demonstrate no acute abnormality.  There is no acute abnormality   of the thoracic aorta.  There is a tiny hiatal hernia. Lungs/pleura: The lungs are without acute process.  No focal consolidation or   pulmonary edema.  No evidence of pleural effusion or pneumothorax. Upper Abdomen: Limited images of the upper abdomen disclose multiple   calcified splenic granulomas.  There appears to been prior partial   hepatectomy involving the right lobe. Soft Tissues/Bones: No acute bone or soft tissue abnormality. LABS:   Results for orders placed or performed during the hospital encounter of 05/24/22   COVID-19, Rapid    Specimen: Nasopharyngeal Swab   Result Value Ref Range    Specimen Description . NASOPHARYNGEAL SWAB     SARS-CoV-2, Rapid Not Detected Not Detected   CBC with Auto Differential   Result Value Ref Range    WBC 9.6 3.5 - 11.0 k/uL    RBC 5.11 4.0 - 5.2 m/uL    Hemoglobin 16.7 (H) 12.0 - 16.0 g/dL    Hematocrit 48.1 (H) 36 - 46 %    MCV 94.2 80 - 100 fL    MCH 32.6 26 - 34 pg    MCHC 34.6 31 - 37 g/dL    RDW 15.8 (H) 12.5 - 15.4 %    Platelets 094 745 - 919 k/uL    MPV 7.0 6.0 - 12.0 fL    Seg Decision Making           Patient was seen for shortness of breath work-up is unremarkable the patient currently is on Eliquis for previous PE and DVT the CT showed no obvious central pulmonary embolism and the ultrasound showed no obvious DVT it did show a superficial thrombophlebitis the patient is no longer tachycardic she is not hypoxic upon my entry into the room the patient's heart rate was in the 90s SPO2 was in the mid 90s given this I do feel she can follow this up as an outpatient recommending she continue her previous medications as already directed return to the ER for increasing shortness of breath fever vomiting or other concerns otherwise to follow-up with her family doctor within the next few days    EMERGENCY DEPARTMENT COURSE:   Vitals:    Vitals:    05/24/22 0354 05/24/22 0659   BP: (!) 149/85    Pulse: 117 100   Resp: 22 17   Temp: 98.6 °F (37 °C)    TempSrc: Oral    SpO2: 94% 91%   Weight: 118.8 kg (262 lb)    Height: 5' 4\" (1.626 m)      -------------------------  BP: (!) 149/85, Temp: 98.6 °F (37 °C), Pulse: 100, Resp: 17      RE-EVALUATION:  At this time the patient is without objective evidence of an acute process requiring hospitalization or inpatient management. They have remained hemodynamically stable throughout their entire ED visit and are stable for discharge with outpatient follow-up. The patient understands that at this time there is no evidence for a more malignant underlying process, but the patient also understands that early in the process of an illness or injury, an emergency department workup can be falsely reassuring. Routine discharge counseling was given, and the patient understands that worsening, changing or persistent symptoms should prompt an immediate call or follow up with their primary physician or return to the emergency department. The importance of appropriate follow up was also discussed.   I have reviewed the disposition diagnosis with the patient and or their family/guardian. I have answered their questions and given discharge instructions. They voiced understanding of these instructions and did not have any further questions or complaints. PROCEDURES:  None    FINAL IMPRESSION      1.  Dyspnea, unspecified type          DISPOSITION/PLAN   DISPOSITION Decision To Discharge 05/24/2022 09:37:21 AM      CONDITION ON DISPOSITION:   Stable    PATIENT REFERRED TO:  Georgette Saab [facility]  [phone]    Call in 1 day        DISCHARGE MEDICATIONS:  New Prescriptions    No medications on file       (Please note that portions of this note were completed with a voicerecognition program.  Efforts were made to edit the dictations but occasionally words are mis-transcribed.)    Maranda Delgadillo MD  Attending Emergency Medicine Physician                    Maranda Delgadillo MD  05/24/22 [id number]

## 2022-05-24 NOTE — ED NOTES
Ultrasound called no answer, radiology called ultrasound scheduled at 0830 pt updated     Luz Elena Harris, RESHMA  05/24/22 2277

## 2022-05-24 NOTE — Clinical Note
Neville Patel was seen and treated in our emergency department on 5/24/2022. She may return to work on 05/26/2022. If you have any questions or concerns, please don't hesitate to call.       Summer Peters, DO

## 2022-05-24 NOTE — ED PROVIDER NOTES
[address] [facility] ED  [address] [facility] [address] [place] [address]  Phone: [phone]  Fax: Astrid Ramirez [phone]      Pt Name: Trung Alaniz  MRN: [id number]  [place] 1960  Date of evaluation: 2022    CHIEF COMPLAINT       Chief Complaint   Patient presents with    Shortness of Breath       HISTORY OF PRESENT ILLNESS    Trung Alaniz is a 65 y.o. female who presents to the emergency department complaining of shortness of breath. Symptoms started over the weekend with cough congestion and laryngitis. Now feeling more short of breath. Last month she was diagnosed with a left lower extremity DVT and subsequently pulmonary emboli as well. She was placed on Eliquis. She was seen again earlier this month for the same CAT scan at that time demonstrated similar appearance and pulmonary emboli and repeat ultrasound left lower extremity showed the same clot and some superficial thrombophlebitis. She now feels like her left leg is getting worse again and swelling. She has associated shortness of breath. Low-grade fevers. She took a home COVID test which was negative yesterday. REVIEW OF SYSTEMS       Constitutional: Positive fevers no chills  HEENT: No sore throat, rhinorrhea, or earache   Eyes: No blurry vision or double vision no drainage   Cardiovascular: Positive chest pain and tachycardia  Respiratory: No wheezing positive shortness of breath and cough  Gastrointestinal: No nausea, vomiting, diarrhea, constipation, or abdominal pain   : No hematuria or dysuria   Musculoskeletal: Positive left leg pain and swelling  Skin: No rash   Neurological: No focal neurologic complaints, paresthesias, weakness, or headache     PAST MEDICAL HISTORY    has a past medical history of Anemia, DVT (deep venous thrombosis) ([address] History of depression, History of spontaneous , Hyperlipidemia, Hypertension, Morbid obesity ([address] and Vision abnormalities.     SURGICAL HISTORY      has a past surgical history that includes Cholecystectomy (); hernia repair (Right, ); and Colonoscopy. CURRENT MEDICATIONS       Previous Medications    APIXABAN (ELIQUIS) 5 MG TABS TABLET    Take 1 tablet by mouth 2 times daily    CITALOPRAM (CELEXA) 10 MG TABLET    TAKE 1 TABLET BY MOUTH EVERY DAY    HYDROCHLOROTHIAZIDE (HYDRODIURIL) 12.5 MG TABLET    TAKE 1 TABLET BY MOUTH EVERY DAY    LISINOPRIL (PRINIVIL;ZESTRIL) 10 MG TABLET    TAKE 1 TABLET BY MOUTH EVERY DAY    ONDANSETRON (ZOFRAN) 4 MG TABLET    Take 4 mg by mouth every 8 hours as needed for Nausea or Vomiting       ALLERGIES     is allergic to codeine. FAMILY HISTORY     She indicated that her mother is alive. She indicated that her father is . She indicated that the status of her neg hx is unknown.     family history includes Heart Disease in her father and mother; Hypertension in her mother. SOCIAL HISTORY      reports that she has never smoked. She has never used smokeless tobacco. She reports current alcohol use. She reports that she does not use drugs. PHYSICAL EXAM       ED Triage Vitals [22 0354]   BP Temp Temp Source Pulse Resp SpO2 Height Weight   (!) 149/85 98.6 °F (37 °C) Oral 117 22 94 % 5' 4\" (1.626 m) 262 lb (118.8 kg)     Constitutional: Alert, oriented x3, nontoxic, answering questions appropriately, acting properly for age, in no acute distress   HEENT: Extraocular muscles intact,   Neck: Trachea midline   Cardiovascular: Regular rhythm and tachycardic no murmurs   Respiratory: Diminished to auscultation bilaterally no wheezes, rhonchi, rales, no respiratory distress no tachypnea no retractions no hypoxia  Gastrointestinal: Soft, nontender, nondistended, positive bowel sounds. No rebound, rigidity, or guarding. Musculoskeletal: Left lower extremity swelling with calf tenderness. Swelling left greater than right.   Neurologic: Moving all 4 extremities without difficulty there are no gross focal neurologic deficits   Skin: Warm and dry     DIFFERENTIAL DIAGNOSIS/ MDM:     IV labs. EKG. Chest CT rule worsening emboli. Unable to obtain ultrasound at this time of day    DIAGNOSTIC RESULTS     EKG: All EKG's are interpreted by the Emergency Department Physician who either signs or Co-signs this chart in the absence of a cardiologist.    357 sinus tachycardia rate 109  QRS 80  no acute ST or T wave changes. Not indicated unless otherwise documented above    LABS:  Results for orders placed or performed during the hospital encounter of 05/24/22   COVID-19, Rapid    Specimen: Nasopharyngeal Swab   Result Value Ref Range    Specimen Description . NASOPHARYNGEAL SWAB     SARS-CoV-2, Rapid Not Detected Not Detected   CBC with Auto Differential   Result Value Ref Range    WBC 9.6 3.5 - 11.0 k/uL    RBC 5.11 4.0 - 5.2 m/uL    Hemoglobin 16.7 (H) 12.0 - 16.0 g/dL    Hematocrit 48.1 (H) 36 - 46 %    MCV 94.2 80 - 100 fL    MCH 32.6 26 - 34 pg    MCHC 34.6 31 - 37 g/dL    RDW 15.8 (H) 12.5 - 15.4 %    Platelets 058 972 - 018 k/uL    MPV 7.0 6.0 - 12.0 fL    Seg Neutrophils 69 (H) 36 - 66 %    Lymphocytes 21 (L) 24 - 44 %    Monocytes 9 2 - 11 %    Eosinophils % 1 1 - 4 %    Basophils 0 0 - 2 %    Segs Absolute 6.50 1.8 - 7.7 k/uL    Absolute Lymph # 2.00 1.0 - 4.8 k/uL    Absolute Mono # 0.90 0.1 - 1.2 k/uL    Absolute Eos # 0.10 0.0 - 0.4 k/uL    Basophils Absolute 0.00 0.0 - 0.2 k/uL   Basic Metabolic Panel   Result Value Ref Range    Glucose 122 (H) 70 - 99 mg/dL    BUN 14 8 - 23 mg/dL    CREATININE 0.96 (H) 0.50 - 0.90 mg/dL    Calcium 8.3 (L) 8.6 - 10.4 mg/dL    Sodium 132 (L) 135 - 144 mmol/L    Potassium 4.0 3.7 - 5.3 mmol/L    Chloride 94 (L) 98 - 107 mmol/L    CO2 26 20 - 31 mmol/L    Anion Gap 12 9 - 17 mmol/L    GFR Non-African American 59 (L) >60 mL/min    GFR African American >60 >60 mL/min    GFR Comment         Troponin   Result Value Ref Range    Troponin, High Sensitivity <6 0 - 14 ng/L   Brain Natriuretic Peptide   Result Value Ref Range    Pro- <300 pg/mL       Not indicated unless otherwise documented above    RADIOLOGY:   I reviewed the radiologist interpretations:    CT CHEST PULMONARY EMBOLISM W CONTRAST   Final Result   No evidence of pulmonary embolism or acute pulmonary abnormality however the   pulmonary arteries are suboptimally opacified. Evidence of old granulomatous disease. Tiny hiatal hernia. There appears to have been prior partial hepatectomy. Not indicated unless otherwise documented above    EMERGENCY DEPARTMENT COURSE:     The patient was given the following medications:  Orders Placed This Encounter   Medications    0.9 % sodium chloride bolus    iopamidol (ISOVUE-370) 76 % injection 75 mL    sodium chloride flush 0.9 % injection 10 mL    ondansetron (ZOFRAN) injection 4 mg    ondansetron (ZOFRAN) 4 MG/2ML injection     Jeanette Manzanares: cabinet override        Vitals:   -------------------------  BP (!) 149/85   Pulse 117   Temp 98.6 °F (37 °C) (Oral)   Resp 22   Ht 5' 4\" (1.626 m)   Wt 118.8 kg (262 lb)   LMP 10/01/2013   SpO2 94%   BMI 44.97 kg/m²     5:55 AM patient resting comfortably no acute distress. No tachypnea retractions or hypoxia. COVID-negative. No anemia normal white blood cell count electrolytes unremarkable just slight hyponatremia and hypochloremia. Normal troponin. CT of the chest poor IV bolus however no large emboli. Will await arrival of ultrasound tech for left lower extremity DVT.   At 7 AM care will be transferred to [address] [place] [address]        (Please note that portions of this note were completed with a voice recognition program.  Efforts were made to edit the dictations but occasionally words are mis-transcribed.)    Kloton Dela Cruz DO   Attending Emergency Physician      Kolton Dela Cruz DO  05/24/22 7836

## 2022-05-25 LAB
EKG ATRIAL RATE: 109 BPM
EKG P AXIS: 9 DEGREES
EKG P-R INTERVAL: 174 MS
EKG Q-T INTERVAL: 328 MS
EKG QRS DURATION: 80 MS
EKG QTC CALCULATION (BAZETT): 441 MS
EKG R AXIS: -26 DEGREES
EKG T AXIS: 26 DEGREES
EKG VENTRICULAR RATE: 109 BPM

## 2022-09-26 ENCOUNTER — HOSPITAL ENCOUNTER (EMERGENCY)
Age: 62
Discharge: HOME | End: 2022-09-26
Attending: EMERGENCY MEDICINE
Payer: COMMERCIAL

## 2022-09-26 ENCOUNTER — APPOINTMENT (OUTPATIENT)
Dept: GENERAL RADIOLOGY | Age: 62
End: 2022-09-26
Payer: COMMERCIAL

## 2022-09-26 ENCOUNTER — APPOINTMENT (OUTPATIENT)
Dept: CT IMAGING | Age: 62
End: 2022-09-26
Payer: COMMERCIAL

## 2022-09-26 VITALS
OXYGEN SATURATION: 93 % | RESPIRATION RATE: 20 BRPM | HEIGHT: 64 IN | WEIGHT: 259 LBS | TEMPERATURE: 98.2 F | BODY MASS INDEX: 44.22 KG/M2 | HEART RATE: 91 BPM | SYSTOLIC BLOOD PRESSURE: 140 MMHG | DIASTOLIC BLOOD PRESSURE: 71 MMHG

## 2022-09-26 DIAGNOSIS — R42 DIZZINESS: Primary | ICD-10-CM

## 2022-09-26 LAB
ABSOLUTE EOS #: 0.3 K/UL (ref 0–0.4)
ABSOLUTE LYMPH #: 2.4 K/UL (ref 1–4.8)
ABSOLUTE MONO #: 0.6 K/UL (ref 0.1–1.2)
ALBUMIN SERPL-MCNC: 3.2 G/DL (ref 3.5–5.2)
ALBUMIN/GLOBULIN RATIO: 0.8 (ref 1–2.5)
ALP BLD-CCNC: 140 U/L (ref 35–104)
ALT SERPL-CCNC: 18 U/L (ref 5–33)
ANION GAP SERPL CALCULATED.3IONS-SCNC: 12 MMOL/L (ref 9–17)
AST SERPL-CCNC: 27 U/L
BACTERIA: ABNORMAL
BASOPHILS # BLD: 1 % (ref 0–2)
BASOPHILS ABSOLUTE: 0 K/UL (ref 0–0.2)
BILIRUB SERPL-MCNC: 1.4 MG/DL (ref 0.3–1.2)
BILIRUBIN DIRECT: 0.3 MG/DL
BILIRUBIN URINE: ABNORMAL
BILIRUBIN, INDIRECT: 1.1 MG/DL (ref 0–1)
BUN BLDV-MCNC: 16 MG/DL (ref 8–23)
CALCIUM SERPL-MCNC: 7.6 MG/DL (ref 8.6–10.4)
CASTS UA: ABNORMAL /LPF
CASTS UA: ABNORMAL /LPF
CHLORIDE BLD-SCNC: 97 MMOL/L (ref 98–107)
CO2: 27 MMOL/L (ref 20–31)
COLOR: YELLOW
CREAT SERPL-MCNC: 0.98 MG/DL (ref 0.5–0.9)
EKG ATRIAL RATE: 98 BPM
EKG P AXIS: 23 DEGREES
EKG P-R INTERVAL: 186 MS
EKG Q-T INTERVAL: 344 MS
EKG QRS DURATION: 74 MS
EKG QTC CALCULATION (BAZETT): 439 MS
EKG R AXIS: -23 DEGREES
EKG T AXIS: 1 DEGREES
EKG VENTRICULAR RATE: 98 BPM
EOSINOPHILS RELATIVE PERCENT: 5 % (ref 1–4)
EPITHELIAL CELLS UA: ABNORMAL /HPF (ref 0–5)
GFR AFRICAN AMERICAN: >60 ML/MIN
GFR NON-AFRICAN AMERICAN: 58 ML/MIN
GFR SERPL CREATININE-BSD FRML MDRD: ABNORMAL ML/MIN/{1.73_M2}
GLUCOSE BLD-MCNC: 120 MG/DL (ref 70–99)
GLUCOSE URINE: NEGATIVE
HCT VFR BLD CALC: 47.6 % (ref 36–46)
HEMOGLOBIN: 16.3 G/DL (ref 12–16)
KETONES, URINE: ABNORMAL
LEUKOCYTE ESTERASE, URINE: NEGATIVE
LYMPHOCYTES # BLD: 36 % (ref 24–44)
MCH RBC QN AUTO: 32.8 PG (ref 26–34)
MCHC RBC AUTO-ENTMCNC: 34.3 G/DL (ref 31–37)
MCV RBC AUTO: 95.6 FL (ref 80–100)
MONOCYTES # BLD: 9 % (ref 2–11)
NITRITE, URINE: NEGATIVE
OTHER OBSERVATIONS UA: ABNORMAL
PDW BLD-RTO: 15.8 % (ref 12.5–15.4)
PH UA: 5.5 (ref 5–8)
PLATELET # BLD: 300 K/UL (ref 140–450)
PMV BLD AUTO: 7.2 FL (ref 6–12)
POTASSIUM SERPL-SCNC: 3.6 MMOL/L (ref 3.7–5.3)
PROTEIN UA: ABNORMAL
RBC # BLD: 4.98 M/UL (ref 4–5.2)
RBC UA: ABNORMAL /HPF (ref 0–2)
SEG NEUTROPHILS: 49 % (ref 36–66)
SEGMENTED NEUTROPHILS ABSOLUTE COUNT: 3.3 K/UL (ref 1.8–7.7)
SODIUM BLD-SCNC: 136 MMOL/L (ref 135–144)
SPECIFIC GRAVITY UA: 1.03 (ref 1–1.03)
TOTAL PROTEIN: 7.1 G/DL (ref 6.4–8.3)
TROPONIN, HIGH SENSITIVITY: 7 NG/L (ref 0–14)
TURBIDITY: CLEAR
URINE HGB: ABNORMAL
UROBILINOGEN, URINE: NORMAL
WBC # BLD: 6.6 K/UL (ref 3.5–11)
WBC UA: ABNORMAL /HPF (ref 0–5)

## 2022-09-26 PROCEDURE — 99285 EMERGENCY DEPT VISIT HI MDM: CPT

## 2022-09-26 PROCEDURE — 81001 URINALYSIS AUTO W/SCOPE: CPT

## 2022-09-26 PROCEDURE — 36415 COLL VENOUS BLD VENIPUNCTURE: CPT

## 2022-09-26 PROCEDURE — 71045 X-RAY EXAM CHEST 1 VIEW: CPT

## 2022-09-26 PROCEDURE — 85025 COMPLETE CBC W/AUTO DIFF WBC: CPT

## 2022-09-26 PROCEDURE — 6360000002 HC RX W HCPCS: Performed by: EMERGENCY MEDICINE

## 2022-09-26 PROCEDURE — 80076 HEPATIC FUNCTION PANEL: CPT

## 2022-09-26 PROCEDURE — 80048 BASIC METABOLIC PNL TOTAL CA: CPT

## 2022-09-26 PROCEDURE — 96374 THER/PROPH/DIAG INJ IV PUSH: CPT

## 2022-09-26 PROCEDURE — 6370000000 HC RX 637 (ALT 250 FOR IP): Performed by: EMERGENCY MEDICINE

## 2022-09-26 PROCEDURE — 84484 ASSAY OF TROPONIN QUANT: CPT

## 2022-09-26 PROCEDURE — 93005 ELECTROCARDIOGRAM TRACING: CPT | Performed by: EMERGENCY MEDICINE

## 2022-09-26 PROCEDURE — 70450 CT HEAD/BRAIN W/O DYE: CPT

## 2022-09-26 RX ORDER — MECLIZINE HYDROCHLORIDE 25 MG/1
25 TABLET ORAL 3 TIMES DAILY PRN
Qty: 30 TABLET | Refills: 0 | Status: SHIPPED | OUTPATIENT
Start: 2022-09-26 | End: 2022-10-06

## 2022-09-26 RX ORDER — ONDANSETRON 2 MG/ML
4 INJECTION INTRAMUSCULAR; INTRAVENOUS ONCE
Status: COMPLETED | OUTPATIENT
Start: 2022-09-26 | End: 2022-09-26

## 2022-09-26 RX ORDER — MECLIZINE HCL 12.5 MG/1
25 TABLET ORAL ONCE
Status: COMPLETED | OUTPATIENT
Start: 2022-09-26 | End: 2022-09-26

## 2022-09-26 RX ORDER — AMLODIPINE BESYLATE 5 MG/1
5 TABLET ORAL DAILY
COMMUNITY
Start: 2022-09-15

## 2022-09-26 RX ORDER — LANOLIN ALCOHOL/MO/W.PET/CERES
3 CREAM (GRAM) TOPICAL NIGHTLY
COMMUNITY
Start: 2022-04-25

## 2022-09-26 RX ADMIN — MECLIZINE 25 MG: 12.5 TABLET ORAL at 08:02

## 2022-09-26 RX ADMIN — ONDANSETRON 4 MG: 2 INJECTION INTRAMUSCULAR; INTRAVENOUS at 08:02

## 2022-09-26 ASSESSMENT — PAIN - FUNCTIONAL ASSESSMENT: PAIN_FUNCTIONAL_ASSESSMENT: 0-10

## 2022-09-26 ASSESSMENT — PAIN DESCRIPTION - LOCATION: LOCATION: CHEST

## 2022-09-26 ASSESSMENT — ENCOUNTER SYMPTOMS: NAUSEA: 1

## 2022-09-26 ASSESSMENT — PAIN DESCRIPTION - PAIN TYPE: TYPE: ACUTE PAIN

## 2022-09-26 ASSESSMENT — PAIN DESCRIPTION - DESCRIPTORS: DESCRIPTORS: HEAVINESS;POUNDING

## 2022-09-26 ASSESSMENT — PAIN SCALES - GENERAL: PAINLEVEL_OUTOF10: 6

## 2022-09-26 ASSESSMENT — PAIN DESCRIPTION - ONSET: ONSET: AWAKENED FROM SLEEP

## 2022-09-26 ASSESSMENT — PAIN DESCRIPTION - ORIENTATION: ORIENTATION: LEFT

## 2022-09-26 NOTE — Clinical Note
Rah Jacy was seen and treated in our emergency department on 9/26/2022. She may return to work on 09/28/2022. If you have any questions or concerns, please don't hesitate to call.       Angelika Razo MD

## 2022-09-26 NOTE — Clinical Note
Benja Zamudio was seen and treated in our emergency department on 9/26/2022. She may return to work on 09/28/2022. If you have any questions or concerns, please don't hesitate to call.       Kiran Leonard MD

## 2022-09-26 NOTE — DISCHARGE INSTRUCTIONS
Return to the ER for worsening dizziness worsening blood pressure or any headache chest pain shortness of breath fever vomiting numbness weakness or other concerns otherwise you are to follow-up with your family doctor within the next few days you are to continue your previous medications as already directed  Please understand that at this time there is no evidence for a more serious underlying process, but that early in the process of an illness or injury, an emergency department workup can be falsely reassuring. You should contact your family doctor within the next 48 hours for a follow up appointment    Jairon Rodríguez!!!    From [facility] ([facility]) and [facility] Emergency Services    On behalf of the Emergency Department staff at [facility]), I would like to thank you for giving us the opportunity to address your health care needs and concerns. We hope that during your visit, our service was delivered in a professional and caring manner. Please keep [facility] ([facility]) in mind as we walk with you down the path to your own personal wellness. Please expect an automated text message or email from us so we can ask a few questions about your health and progress. Based on your answers, a clinician may call you back to offer help and instructions. Please understand that early in the process of an illness or injury, an emergency department workup can be falsely reassuring. If you notice any worsening, changing or persistent symptoms please call your family doctor or return to the ER immediately. Tell us how we did during your visit at [email or web address]   and let us know about your experience

## 2022-09-26 NOTE — ED PROVIDER NOTES
[id number] [facility] ED  [address] [facility] [address]  Phone: [phone]  Fax: [phone]        Pt Name: Johana Guillen  MRN: [id number]  [place] 1960  Date of evaluation: 22      CHIEF COMPLAINT     Chief Complaint   Patient presents with    Hypertension    Dizziness    Nausea         HISTORY OF PRESENT ILLNESS  (Location/Symptom, Timing/Onset, Context/Setting, Quality, Duration, Modifying Factors, Severity.)    Johana Guillen is a 65 y.o. female who presents dizziness nausea high blood pressure. The patient states that she has had for the last 2 weeks episodes of dizziness associated with nausea where she feels as though everything is spinning and her blood pressure is elevated she saw her family doctor who adjusted her blood pressure medications but she is still having symptoms she states the dizziness is slightly worse with changing position of her head she denies any visual changes states that her hearing does seem to be a little subdued no ringing in her ears no chest pain no shortness of breath she is nauseated but no vomiting or diarrhea no abdominal pain no fever no chills no numbness or weakness of the arms or legs      REVIEW OF SYSTEMS    (2-9 systems for level 4, 10 or more for level 5)     Review of Systems   Gastrointestinal:  Positive for nausea. Neurological:  Positive for dizziness. All other systems reviewed and are negative. PAST MEDICAL HISTORY    has a past medical history of Anemia, DVT (deep venous thrombosis) ([address] History of depression, History of spontaneous , Hyperlipidemia, Hypertension, Morbid obesity ([address] and Vision abnormalities. SURGICAL HISTORY      has a past surgical history that includes Cholecystectomy (); hernia repair (Right, ); and Colonoscopy.     CURRENTMEDICATIONS       Previous Medications    AMLODIPINE (NORVASC) 5 MG TABLET    Take 5 mg by mouth daily    APIXABAN (ELIQUIS) 5 MG TABS TABLET distension. Palpations: Abdomen is soft. Tenderness: no abdominal tenderness There is no right CVA tenderness, left CVA tenderness or guarding. Musculoskeletal:         General: No swelling or tenderness. Normal range of motion. Cervical back: Normal range of motion and neck supple. No rigidity or tenderness. Lymphadenopathy:      Cervical: No cervical adenopathy. Skin:     General: Skin is warm and dry. Findings: No rash. Neurological:      General: No focal deficit present. Mental Status: She is alert. Cranial Nerves: No cranial nerve deficit. Sensory: No sensory deficit. Motor: No weakness. Coordination: Coordination normal.      Gait: Gait normal.       DIFFERENTIAL DIAGNOSIS/ MDM:     We will establish an IV check labs EKG CT of the head chest x-ray and a UA I will give the patient some Zofran and Antivert    DIAGNOSTIC RESULTS     EKG: All EKG's are interpreted by the Emergency Department Physician who either signs or Co-signs this chart in the absence of a cardiologist.      Interpreted by Whit Kan MD     Rhythm: normal sinus   Rate: 98  Axis: -23  Ectopy: Occasional PVC  Conduction: normal  ST Segments: no acute change  T Waves: no acute change  Q Waves: none    Clinical Impression: normal sinus rhythm with no acute changes/normal EKG. No acute infarction/ischemia noted. RADIOLOGY:        Interpretation per the Radiologist below, if available at the time of this note:    CT HEAD WO CONTRAST    Result Date: 9/26/2022  EXAMINATION: CT OF THE HEAD WITHOUT CONTRAST  9/26/2022 8:06 am TECHNIQUE: CT of the head was performed without the administration of intravenous contrast. Automated exposure control, iterative reconstruction, and/or weight based adjustment of the mA/kV was utilized to reduce the radiation dose to as low as reasonably achievable. COMPARISON: None.  HISTORY: ORDERING SYSTEM PROVIDED HISTORY: dizziness TECHNOLOGIST PROVIDED HISTORY: dizziness Decision Support Exception - unselect if not a suspected or confirmed emergency medical condition->Emergency Medical Condition (MA) Reason for Exam: dizziness, htn FINDINGS: BRAIN/VENTRICLES: There is no acute intracranial hemorrhage, mass effect or midline shift. No abnormal extra-axial fluid collection. The gray-white differentiation is maintained without evidence of an acute infarct. There is no evidence of hydrocephalus. ORBITS: The visualized portion of the orbits demonstrate no acute abnormality. SINUSES: The visualized paranasal sinuses and mastoid air cells demonstrate no acute abnormality. SOFT TISSUES/SKULL:  No acute abnormality of the visualized skull or soft tissues. No acute intracranial abnormality. XR CHEST PORTABLE    Result Date: 9/26/2022  EXAMINATION: ONE XRAY VIEW OF THE CHEST 9/26/2022 8:05 am COMPARISON: February 19, 2014 HISTORY: ORDERING SYSTEM PROVIDED HISTORY: htn TECHNOLOGIST PROVIDED HISTORY: htn Reason for Exam: dizziness, htn, nausea FINDINGS: Normal cardiac size. Minimal atelectasis left lung base. No pneumothorax. Moderate osteopenic changes and degenerative changes noted in the bony structures. .     Minimal atelectasis left lung base. No pneumothorax.        LABS:  Results for orders placed or performed during the hospital encounter of 66/38/22   Basic Metabolic Panel   Result Value Ref Range    Glucose 120 (H) 70 - 99 mg/dL    BUN 16 8 - 23 mg/dL    Creatinine 0.98 (H) 0.50 - 0.90 mg/dL    Calcium 7.6 (L) 8.6 - 10.4 mg/dL    Sodium 136 135 - 144 mmol/L    Potassium 3.6 (L) 3.7 - 5.3 mmol/L    Chloride 97 (L) 98 - 107 mmol/L    CO2 27 20 - 31 mmol/L    Anion Gap 12 9 - 17 mmol/L    GFR Non-African American 58 (L) >60 mL/min    GFR African American >60 >60 mL/min    GFR Comment         CBC with Auto Differential   Result Value Ref Range    WBC 6.6 3.5 - 11.0 k/uL    RBC 4.98 4.0 - 5.2 m/uL    Hemoglobin 16.3 (H) 12.0 - 16.0 g/dL    Hematocrit 47.6 (H) 36 - 46 %    MCV 95.6 80 - 100 fL    MCH 32.8 26 - 34 pg    MCHC 34.3 31 - 37 g/dL    RDW 15.8 (H) 12.5 - 15.4 %    Platelets 072 399 - 129 k/uL    MPV 7.2 6.0 - 12.0 fL    Seg Neutrophils 49 36 - 66 %    Lymphocytes 36 24 - 44 %    Monocytes 9 2 - 11 %    Eosinophils % 5 (H) 1 - 4 %    Basophils 1 0 - 2 %    Segs Absolute 3.30 1.8 - 7.7 k/uL    Absolute Lymph # 2.40 1.0 - 4.8 k/uL    Absolute Mono # 0.60 0.1 - 1.2 k/uL    Absolute Eos # 0.30 0.0 - 0.4 k/uL    Basophils Absolute 0.00 0.0 - 0.2 k/uL   Hepatic Function Panel   Result Value Ref Range    Albumin 3.2 (L) 3.5 - 5.2 g/dL    Alkaline Phosphatase 140 (H) 35 - 104 U/L    ALT 18 5 - 33 U/L    AST 27 <32 U/L    Total Bilirubin 1.4 (H) 0.3 - 1.2 mg/dL    Bilirubin, Direct 0.3 <0.31 mg/dL    Bilirubin, Indirect 1.1 (H) 0.00 - 1.00 mg/dL    Total Protein 7.1 6.4 - 8.3 g/dL    Albumin/Globulin Ratio 0.8 (L) 1.0 - 2.5   Troponin   Result Value Ref Range    Troponin, High Sensitivity 7 0 - 14 ng/L   Urinalysis with Reflex to Culture   Result Value Ref Range    Color, UA Yellow Yellow    Turbidity UA Clear Clear    Glucose, Ur NEGATIVE NEGATIVE    Bilirubin Urine NEGATIVE  Verified by ictotest. (A) NEGATIVE    Ketones, Urine SMALL (A) NEGATIVE    Specific Gravity, UA 1.028 1.005 - 1.030    Urine Hgb TRACE (A) NEGATIVE    pH, UA 5.5 5.0 - 8.0    Protein, UA TRACE (A) NEGATIVE    Urobilinogen, Urine Normal Normal    Nitrite, Urine NEGATIVE NEGATIVE    Leukocyte Esterase, Urine NEGATIVE NEGATIVE   Microscopic Urinalysis   Result Value Ref Range    WBC, UA 0 TO 2 0 - 5 /HPF    RBC, UA 0 TO 2 0 - 2 /HPF    Casts UA 2 TO 5 /LPF    Casts UA HYALINE /LPF    Epithelial Cells UA 10 TO 20 0 - 5 /HPF    Bacteria, UA FEW (A) None    Other Observations UA (A) NOT REQ. Utilizing a urinalysis as the only screening method to exclude a potential uropathogen can be unreliable in many patient populations.   Rapid screening tests are less sensitive than culture and if UTI is a clinical possibility, culture should be considered despite a negative urinalysis. EKG 12 Lead   Result Value Ref Range    Ventricular Rate 98 BPM    Atrial Rate 98 BPM    P-R Interval 186 ms    QRS Duration 74 ms    Q-T Interval 344 ms    QTc Calculation (Bazett) 439 ms    P Axis 23 degrees    R Axis -23 degrees    T Axis 1 degrees           EMERGENCY DEPARTMENT COURSE:   Vitals:    Vitals:    09/26/22 0702 09/26/22 0800   BP: (!) 170/93 (!) 145/73   Pulse: 96 91   Resp: 20 11   Temp: 98.2 °F (36.8 °C)    SpO2: 97% 96%   Weight: 117.5 kg (259 lb)    Height: 5' 4\" (1.626 m)      -------------------------  BP: (!) 145/73, Temp: 98.2 °F (36.8 °C), Heart Rate: 91, Resp: 11      RE-EVALUATION:  The patient on repeat evaluation is feeling better her blood pressure is improved with this I do feel she can follow this up as an outpatient lab work imaging EKG are all essentially unremarkable I will go ahead and write a prescription for Antivert recommending she continue her previous medications as already directed return to the ER for worsening dizziness any headache chest pain shortness of breath fever vomiting or other concerns otherwise to follow-up with her family doctor within the next few days  At this time the patient is without objective evidence of an acute process requiring hospitalization or inpatient management. They have remained hemodynamically stable throughout their entire ED visit and are stable for discharge with outpatient follow-up. The patient understands that at this time there is no evidence for a more malignant underlying process, but the patient also understands that early in the process of an illness or injury, an emergency department workup can be falsely reassuring. Routine discharge counseling was given, and the patient understands that worsening, changing or persistent symptoms should prompt an immediate call or follow up with their primary physician or return to the emergency department.  The importance of appropriate follow up was also discussed. I have reviewed the disposition diagnosis with the patient and or their family/guardian. I have answered their questions and given discharge instructions. They voiced understanding of these instructions and did not have any further questions or complaints. PROCEDURES:  None    FINAL IMPRESSION      1. Dizziness          DISPOSITION/PLAN   DISPOSITION Decision To Discharge 09/26/2022 08:55:06 AM      CONDITION ON DISPOSITION:   Improved - Stable    PATIENT REFERRED TO:  Selene Sahu, APRN - [address]  [phone]    Call in 1 day      DISCHARGE MEDICATIONS:  New Prescriptions    MECLIZINE (ANTIVERT) 25 MG TABLET    Take 1 tablet by mouth 3 times daily as needed for Dizziness       (Please note that portions of this note were completed with a voicerecognition program.  Efforts were made to edit the dictations but occasionally words are mis-transcribed.)    Caprice Mendoza MD,, MD, F.A.C.E.P.   Attending Emergency Medicine Physician       Caprice Mendoza MD  09/26/22 7979

## 2022-10-17 ENCOUNTER — HOSPITAL ENCOUNTER (EMERGENCY)
Age: 62
Discharge: HOME | End: 2022-10-17
Attending: SPECIALIST
Payer: COMMERCIAL

## 2022-10-17 ENCOUNTER — APPOINTMENT (OUTPATIENT)
Dept: ULTRASOUND IMAGING | Age: 62
End: 2022-10-17
Payer: COMMERCIAL

## 2022-10-17 VITALS
TEMPERATURE: 98.2 F | DIASTOLIC BLOOD PRESSURE: 93 MMHG | BODY MASS INDEX: 42.68 KG/M2 | HEIGHT: 64 IN | SYSTOLIC BLOOD PRESSURE: 180 MMHG | WEIGHT: 250 LBS | HEART RATE: 92 BPM | RESPIRATION RATE: 16 BRPM | OXYGEN SATURATION: 95 %

## 2022-10-17 DIAGNOSIS — I80.02 THROMBOPHLEBITIS OF SUPERFICIAL VEINS OF LEFT LOWER EXTREMITY: Primary | ICD-10-CM

## 2022-10-17 LAB
ABSOLUTE EOS #: 0.2 K/UL (ref 0–0.4)
ABSOLUTE LYMPH #: 2.2 K/UL (ref 1–4.8)
ABSOLUTE MONO #: 0.8 K/UL (ref 0.1–1.2)
ANION GAP SERPL CALCULATED.3IONS-SCNC: 11 MMOL/L (ref 9–17)
BASOPHILS # BLD: 1 % (ref 0–2)
BASOPHILS ABSOLUTE: 0.1 K/UL (ref 0–0.2)
BUN BLDV-MCNC: 13 MG/DL (ref 8–23)
CALCIUM SERPL-MCNC: 7.8 MG/DL (ref 8.6–10.4)
CHLORIDE BLD-SCNC: 97 MMOL/L (ref 98–107)
CO2: 28 MMOL/L (ref 20–31)
CREAT SERPL-MCNC: 0.83 MG/DL (ref 0.5–0.9)
EOSINOPHILS RELATIVE PERCENT: 2 % (ref 1–4)
GFR SERPL CREATININE-BSD FRML MDRD: >60 ML/MIN/1.73M2
GLUCOSE BLD-MCNC: 118 MG/DL (ref 70–99)
HCT VFR BLD CALC: 48 % (ref 36–46)
HEMOGLOBIN: 16.1 G/DL (ref 12–16)
LYMPHOCYTES # BLD: 23 % (ref 24–44)
MCH RBC QN AUTO: 32.6 PG (ref 26–34)
MCHC RBC AUTO-ENTMCNC: 33.6 G/DL (ref 31–37)
MCV RBC AUTO: 97.3 FL (ref 80–100)
MONOCYTES # BLD: 9 % (ref 2–11)
PDW BLD-RTO: 17.2 % (ref 12.5–15.4)
PLATELET # BLD: 346 K/UL (ref 140–450)
PMV BLD AUTO: 7.2 FL (ref 6–12)
POTASSIUM SERPL-SCNC: 3.9 MMOL/L (ref 3.7–5.3)
RBC # BLD: 4.93 M/UL (ref 4–5.2)
SEG NEUTROPHILS: 65 % (ref 36–66)
SEGMENTED NEUTROPHILS ABSOLUTE COUNT: 6.1 K/UL (ref 1.8–7.7)
SODIUM BLD-SCNC: 136 MMOL/L (ref 135–144)
WBC # BLD: 9.4 K/UL (ref 3.5–11)

## 2022-10-17 PROCEDURE — 93971 EXTREMITY STUDY: CPT

## 2022-10-17 PROCEDURE — 80048 BASIC METABOLIC PNL TOTAL CA: CPT

## 2022-10-17 PROCEDURE — 6370000000 HC RX 637 (ALT 250 FOR IP): Performed by: EMERGENCY MEDICINE

## 2022-10-17 PROCEDURE — 99284 EMERGENCY DEPT VISIT MOD MDM: CPT

## 2022-10-17 PROCEDURE — 36415 COLL VENOUS BLD VENIPUNCTURE: CPT

## 2022-10-17 PROCEDURE — 85025 COMPLETE CBC W/AUTO DIFF WBC: CPT

## 2022-10-17 RX ORDER — ONDANSETRON 4 MG/1
4 TABLET, ORALLY DISINTEGRATING ORAL ONCE
Status: COMPLETED | OUTPATIENT
Start: 2022-10-17 | End: 2022-10-17

## 2022-10-17 RX ADMIN — ONDANSETRON 4 MG: 4 TABLET, ORALLY DISINTEGRATING ORAL at 07:16

## 2022-10-17 ASSESSMENT — PAIN - FUNCTIONAL ASSESSMENT: PAIN_FUNCTIONAL_ASSESSMENT: 0-10

## 2022-10-17 ASSESSMENT — PAIN DESCRIPTION - ORIENTATION
ORIENTATION: LEFT
ORIENTATION: LEFT

## 2022-10-17 ASSESSMENT — PAIN DESCRIPTION - PAIN TYPE
TYPE: ACUTE PAIN
TYPE: ACUTE PAIN

## 2022-10-17 ASSESSMENT — PAIN DESCRIPTION - ONSET: ONSET: ON-GOING

## 2022-10-17 ASSESSMENT — PAIN DESCRIPTION - FREQUENCY: FREQUENCY: CONTINUOUS

## 2022-10-17 ASSESSMENT — LIFESTYLE VARIABLES
HOW MANY STANDARD DRINKS CONTAINING ALCOHOL DO YOU HAVE ON A TYPICAL DAY: PATIENT DOES NOT DRINK
HOW OFTEN DO YOU HAVE A DRINK CONTAINING ALCOHOL: NEVER

## 2022-10-17 ASSESSMENT — PAIN DESCRIPTION - LOCATION
LOCATION: LEG
LOCATION: LEG

## 2022-10-17 ASSESSMENT — PAIN SCALES - GENERAL
PAINLEVEL_OUTOF10: 7
PAINLEVEL_OUTOF10: 6

## 2022-10-17 NOTE — Clinical Note
Marga Dunn was seen and treated in our emergency department on 10/17/2022. She may return to work on 10/18/2022. If you have any questions or concerns, please don't hesitate to call.       Demetrio Juarez, DO

## 2022-10-17 NOTE — LETTER
[id number] [facility] ED  [address] [facility] [address]  Phone: [phone]  Fax: [phone]               October 17, 2022    Patient: Calin Lucero   YOB: 1960   Date of Visit: 10/17/2022       To Whom It May Concern:    Kentrell Barcenas was seen and treated in our emergency department on 10/17/2022. She may return to work on 10/19/22.       Sincerely,       Shira Loving RN         Signature:__________________________________

## 2022-10-17 NOTE — ED PROVIDER NOTES
[id number] [facility] ED  [address] [facility] [address]  Phone: [phone]  Fax: Astrid Ramirez [phone]      Pt Name: Ilene Sherwood  MRN: [id number]  [place] 1960  Date of evaluation: 10/17/2022    CHIEF COMPLAINT       Chief Complaint   Patient presents with    Leg Swelling    Leg Pain     Left leg edema and pain. Recent blood clot to left leg       HISTORY OF PRESENT ILLNESS    Ilene Sherwood is a 65 y.o. female who presents to the emergency department with left leg pain and swelling for the past 2 weeks. History of DVT in April was treated on Eliquis no longer being anticoagulated. Denies any chest pain admits to some shortness of breath. No fevers or chills. She noticed some discoloration over the past couple days she is complaining of pain 6 out of 10. With exception of nausea denies any other symptoms. REVIEW OF SYSTEMS       Constitutional: No fevers or chills   HEENT: No sore throat, rhinorrhea, or earache   Eyes: No blurry vision or double vision no drainage   Cardiovascular: No chest pain or tachycardia   Respiratory: No wheezing positive shortness of breath no cough   Gastrointestinal: No nausea, vomiting, diarrhea, constipation, or abdominal pain   : No hematuria or dysuria   Musculoskeletal: Positive left leg pain and swelling  Skin: No rash   Neurological: No focal neurologic complaints, paresthesias, weakness, or headache     PAST MEDICAL HISTORY    has a past medical history of Anemia, DVT (deep venous thrombosis) ([address] History of depression, History of spontaneous , Hyperlipidemia, Hypertension, Morbid obesity ([address] and Vision abnormalities. SURGICAL HISTORY      has a past surgical history that includes Cholecystectomy (); hernia repair (Right, ); and Colonoscopy.     CURRENT MEDICATIONS       Previous Medications    AMLODIPINE (NORVASC) 5 MG TABLET    Take 5 mg by mouth daily    APIXABAN (ELIQUIS) 5 MG TABS TABLET Take 1 tablet by mouth 2 times daily    CITALOPRAM (CELEXA) 10 MG TABLET    TAKE 1 TABLET BY MOUTH EVERY DAY    HYDROCHLOROTHIAZIDE (HYDRODIURIL) 12.5 MG TABLET    TAKE 1 TABLET BY MOUTH EVERY DAY    LISINOPRIL (PRINIVIL;ZESTRIL) 10 MG TABLET    40 mg    MELATONIN 3 MG TABS TABLET    Take 3 mg by mouth nightly    ONDANSETRON (ZOFRAN) 4 MG TABLET    Take 4 mg by mouth every 8 hours as needed for Nausea or Vomiting       ALLERGIES     is allergic to codeine. FAMILY HISTORY     She indicated that her mother is alive. She indicated that her father is . She indicated that the status of her neg hx is unknown.     family history includes Heart Disease in her father and mother; Hypertension in her mother. SOCIAL HISTORY      reports that she has never smoked. She has never used smokeless tobacco. She reports current alcohol use. She reports that she does not use drugs. PHYSICAL EXAM       ED Triage Vitals [10/17/22 0649]   BP Temp Temp Source Heart Rate Resp SpO2 Height Weight   (!) 180/93 98.2 °F (36.8 °C) Oral (!) 106 16 96 % 5' 4\" (1.626 m) 250 lb (113.4 kg)     Constitutional: Alert, oriented x3, nontoxic, answering questions appropriately, acting properly for age, in no acute distress   HEENT: Extraocular muscles intact,  Neck: Trachea midline   Cardiovascular: Regular rhythm and tachycardic no murmurs   Respiratory: Diminished to auscultation bilaterally no wheezes, rhonchi, rales, no respiratory distress no tachypnea no retractions no hypoxia  Gastrointestinal: Soft, nontender, nondistended, positive bowel sounds. No rebound, rigidity, or guarding. Musculoskeletal: Left lower extremity swelling greater than right. More ruborous than the right side no increased warmth. Pedal pulse intact bilaterally. Capillary refill less than 2 seconds. Neurologic: Moving all 4 extremities   Skin: Warm and dry     DIFFERENTIAL DIAGNOSIS/ MDM:     Left lower extremity swelling and pain suspect DVT.   IV and labs.    DIAGNOSTIC RESULTS     EKG: All EKG's are interpreted by the Emergency Department Physician who either signs or Co-signs this chart in the absence of a cardiologist.        Not indicated unless otherwise documented above    LABS:  Results for orders placed or performed during the hospital encounter of 10/17/22   CBC with Auto Differential   Result Value Ref Range    WBC 9.4 3.5 - 11.0 k/uL    RBC 4.93 4.0 - 5.2 m/uL    Hemoglobin 16.1 (H) 12.0 - 16.0 g/dL    Hematocrit 48.0 (H) 36 - 46 %    MCV 97.3 80 - 100 fL    MCH 32.6 26 - 34 pg    MCHC 33.6 31 - 37 g/dL    RDW 17.2 (H) 12.5 - 15.4 %    Platelets 663 066 - 767 k/uL    MPV 7.2 6.0 - 12.0 fL    Seg Neutrophils 65 36 - 66 %    Lymphocytes 23 (L) 24 - 44 %    Monocytes 9 2 - 11 %    Eosinophils % 2 1 - 4 %    Basophils 1 0 - 2 %    Segs Absolute 6.10 1.8 - 7.7 k/uL    Absolute Lymph # 2.20 1.0 - 4.8 k/uL    Absolute Mono # 0.80 0.1 - 1.2 k/uL    Absolute Eos # 0.20 0.0 - 0.4 k/uL    Basophils Absolute 0.10 0.0 - 0.2 k/uL   Basic Metabolic Panel   Result Value Ref Range    Glucose 118 (H) 70 - 99 mg/dL    BUN 13 8 - 23 mg/dL    Creatinine 0.83 0.50 - 0.90 mg/dL    Est, Glom Filt Rate >60 >60 mL/min/1.73m2    Calcium 7.8 (L) 8.6 - 10.4 mg/dL    Sodium 136 135 - 144 mmol/L    Potassium 3.9 3.7 - 5.3 mmol/L    Chloride 97 (L) 98 - 107 mmol/L    CO2 28 20 - 31 mmol/L    Anion Gap 11 9 - 17 mmol/L       Not indicated unless otherwise documented above    RADIOLOGY:   I reviewed the radiologist interpretations:    US DUP LOWER EXTREMITY LEFT JATIN   Final Result   1. No evidence for acute deep vein thrombosis. 2. There is evidence for superficial vein thrombosis. Similar superficial   vein thrombosis noted on prior.              Not indicated unless otherwise documented above    EMERGENCY DEPARTMENT COURSE:     The patient was given the following medications:  Orders Placed This Encounter   Medications    ondansetron (ZOFRAN-ODT) disintegrating tablet 4 mg Vitals:   -------------------------  BP (!) 180/93   Pulse 92   Temp 98.2 °F (36.8 °C) (Oral)   Resp 16   Ht 5' 4\" (1.626 m)   Wt 113.4 kg (250 lb)   LMP 10/01/2013   SpO2 95%   BMI 42.91 kg/m²     9:20 AM ultrasound positive for superficial thrombophlebitis no DVT. Warm compresses as needed. Follow-up with family physician for reevaluation return if worsening symptoms or other concerns. Normal white blood cell count no anemia. Normal electrodes and kidney function. The patient and her visitor understands that at this time there is no evidence for a more malignant underlying process, but also understands that early in the process of an illness or injury, an emergency department workup can be falsely reassuring. Routine discharge counseling was given, and it is understood that worsening, changing or persistent symptoms should prompt an immediate call or follow up with their primary physician or return to the emergency department. The importance of appropriate follow up was also discussed. I have reviewed the disposition diagnosis. I have answered the questions and given discharge instructions. There was voiced understanding of these instructions and no further questions or complaints. CRITICAL CARE:    None    CONSULTS:    None    PROCEDURES:    None      OARRS Report if indicated             FINAL IMPRESSION      1.  Thrombophlebitis of superficial veins of left lower extremity          DISPOSITION/PLAN   DISPOSITION Decision To Discharge 10/17/2022 09:19:37 AM        CONDITION ON DISPOSITION: STABLE       PATIENT REFERRED TO:  CONCHIS Lemon CNP [address] [place] [address]  [phone]    Schedule an appointment as soon as possible for a visit in 1 week      DISCHARGE MEDICATIONS:  New Prescriptions    No medications on file       (Please note that portions of this note were completed with a voice recognition program.  Efforts were made to edit the dictations but occasionally words are mis-transcribed.)    Kaleigh oNlan DO   Attending Emergency Physician     Kaleigh Nolan DO  10/17/22 [id number]

## 2022-10-17 NOTE — DISCHARGE INSTRUCTIONS
Warm compresses as discussed  Return immediately if any worsening symptoms or any other concerns    Tell us how we did visit: [email or web address]   and let us know about your experience

## 2022-11-14 ENCOUNTER — HOSPITAL ENCOUNTER (EMERGENCY)
Age: 62
Discharge: HOME | End: 2022-11-14
Attending: EMERGENCY MEDICINE
Payer: COMMERCIAL

## 2022-11-14 ENCOUNTER — APPOINTMENT (OUTPATIENT)
Dept: CT IMAGING | Age: 62
End: 2022-11-14
Payer: COMMERCIAL

## 2022-11-14 VITALS
HEART RATE: 90 BPM | RESPIRATION RATE: 14 BRPM | SYSTOLIC BLOOD PRESSURE: 152 MMHG | TEMPERATURE: 98.4 F | HEIGHT: 64 IN | BODY MASS INDEX: 43.54 KG/M2 | OXYGEN SATURATION: 96 % | DIASTOLIC BLOOD PRESSURE: 80 MMHG | WEIGHT: 255 LBS

## 2022-11-14 DIAGNOSIS — R42 VERTIGO: Primary | ICD-10-CM

## 2022-11-14 LAB
ABSOLUTE EOS #: 0.3 K/UL (ref 0–0.4)
ABSOLUTE LYMPH #: 2.1 K/UL (ref 1–4.8)
ABSOLUTE MONO #: 0.5 K/UL (ref 0.1–1.2)
ANION GAP SERPL CALCULATED.3IONS-SCNC: 12 MMOL/L (ref 9–17)
BASOPHILS # BLD: 1 % (ref 0–2)
BASOPHILS ABSOLUTE: 0 K/UL (ref 0–0.2)
BUN BLDV-MCNC: 16 MG/DL (ref 8–23)
CALCIUM SERPL-MCNC: 7.8 MG/DL (ref 8.6–10.4)
CHLORIDE BLD-SCNC: 99 MMOL/L (ref 98–107)
CO2: 28 MMOL/L (ref 20–31)
CREAT SERPL-MCNC: 0.72 MG/DL (ref 0.5–0.9)
EKG ATRIAL RATE: 95 BPM
EKG P AXIS: 20 DEGREES
EKG P-R INTERVAL: 196 MS
EKG Q-T INTERVAL: 348 MS
EKG QRS DURATION: 78 MS
EKG QTC CALCULATION (BAZETT): 437 MS
EKG R AXIS: -21 DEGREES
EKG T AXIS: 3 DEGREES
EKG VENTRICULAR RATE: 95 BPM
EOSINOPHILS RELATIVE PERCENT: 4 % (ref 1–4)
GFR SERPL CREATININE-BSD FRML MDRD: >60 ML/MIN/1.73M2
GLUCOSE BLD-MCNC: 119 MG/DL (ref 70–99)
HCT VFR BLD CALC: 49 % (ref 36–46)
HEMOGLOBIN: 16.5 G/DL (ref 12–16)
LYMPHOCYTES # BLD: 33 % (ref 24–44)
MCH RBC QN AUTO: 33.4 PG (ref 26–34)
MCHC RBC AUTO-ENTMCNC: 33.7 G/DL (ref 31–37)
MCV RBC AUTO: 99.2 FL (ref 80–100)
MONOCYTES # BLD: 9 % (ref 2–11)
PDW BLD-RTO: 16.7 % (ref 12.5–15.4)
PLATELET # BLD: 283 K/UL (ref 140–450)
PMV BLD AUTO: 7.3 FL (ref 6–12)
POTASSIUM SERPL-SCNC: 3.3 MMOL/L (ref 3.7–5.3)
RBC # BLD: 4.94 M/UL (ref 4–5.2)
SEG NEUTROPHILS: 53 % (ref 36–66)
SEGMENTED NEUTROPHILS ABSOLUTE COUNT: 3.4 K/UL (ref 1.8–7.7)
SODIUM BLD-SCNC: 139 MMOL/L (ref 135–144)
TROPONIN, HIGH SENSITIVITY: 9 NG/L (ref 0–14)
WBC # BLD: 6.3 K/UL (ref 3.5–11)

## 2022-11-14 PROCEDURE — 36415 COLL VENOUS BLD VENIPUNCTURE: CPT

## 2022-11-14 PROCEDURE — 80048 BASIC METABOLIC PNL TOTAL CA: CPT

## 2022-11-14 PROCEDURE — 96374 THER/PROPH/DIAG INJ IV PUSH: CPT

## 2022-11-14 PROCEDURE — 85025 COMPLETE CBC W/AUTO DIFF WBC: CPT

## 2022-11-14 PROCEDURE — 6360000002 HC RX W HCPCS: Performed by: EMERGENCY MEDICINE

## 2022-11-14 PROCEDURE — 2580000003 HC RX 258: Performed by: EMERGENCY MEDICINE

## 2022-11-14 PROCEDURE — 70450 CT HEAD/BRAIN W/O DYE: CPT

## 2022-11-14 PROCEDURE — 99284 EMERGENCY DEPT VISIT MOD MDM: CPT

## 2022-11-14 PROCEDURE — 84484 ASSAY OF TROPONIN QUANT: CPT

## 2022-11-14 PROCEDURE — 93005 ELECTROCARDIOGRAM TRACING: CPT | Performed by: EMERGENCY MEDICINE

## 2022-11-14 RX ORDER — 0.9 % SODIUM CHLORIDE 0.9 %
500 INTRAVENOUS SOLUTION INTRAVENOUS ONCE
Status: COMPLETED | OUTPATIENT
Start: 2022-11-14 | End: 2022-11-14

## 2022-11-14 RX ORDER — ONDANSETRON 2 MG/ML
4 INJECTION INTRAMUSCULAR; INTRAVENOUS ONCE
Status: COMPLETED | OUTPATIENT
Start: 2022-11-14 | End: 2022-11-14

## 2022-11-14 RX ADMIN — SODIUM CHLORIDE 500 ML: 9 INJECTION, SOLUTION INTRAVENOUS at 07:56

## 2022-11-14 RX ADMIN — ONDANSETRON 4 MG: 2 INJECTION INTRAMUSCULAR; INTRAVENOUS at 07:58

## 2022-11-14 ASSESSMENT — PAIN DESCRIPTION - LOCATION: LOCATION: HEAD

## 2022-11-14 ASSESSMENT — PAIN DESCRIPTION - ONSET: ONSET: GRADUAL

## 2022-11-14 ASSESSMENT — PAIN - FUNCTIONAL ASSESSMENT
PAIN_FUNCTIONAL_ASSESSMENT: 0-10
PAIN_FUNCTIONAL_ASSESSMENT: PREVENTS OR INTERFERES SOME ACTIVE ACTIVITIES AND ADLS

## 2022-11-14 ASSESSMENT — PAIN DESCRIPTION - DESCRIPTORS: DESCRIPTORS: DULL

## 2022-11-14 ASSESSMENT — PAIN DESCRIPTION - FREQUENCY: FREQUENCY: INTERMITTENT

## 2022-11-14 ASSESSMENT — PAIN DESCRIPTION - ORIENTATION: ORIENTATION: ANTERIOR

## 2022-11-14 ASSESSMENT — PAIN DESCRIPTION - PAIN TYPE: TYPE: CHRONIC PAIN

## 2022-11-14 NOTE — ED NOTES
Patient provided with discharge instructions, prescriptions, and follow up information. Verbalized understanding. IV discontinued and dry dressing in place. A&OX3. Steady gait noted at discharge. Wheelchair declined by patient.       Nina Moody RN  11/14/22 0724

## 2022-11-14 NOTE — DISCHARGE INSTRUCTIONS
Continue medications as directed. Avoid bending over, looking up, or sleeping on the affected side. Return for pain, vomiting, focal weakness or numbness, or if worse in any way. Please understand that at this time there is no evidence for a more serious underlying process, but that early in the process of an illness or injury, an emergency department workup can be falsely reassuring. You should contact your family doctor within the next 48 hours for a follow up appointment    Jairon Rodríguez!!!    From [facility] ([facility]) and [facility] Emergency Services    On behalf of the Emergency Department staff at [facility]), I would like to thank you for giving us the opportunity to address your health care needs and concerns. We hope that during your visit, our service was delivered in a professional and caring manner. Please keep [facility] in mind as we walk with you down the path to your own personal wellness. Please expect an automated text message or email from us so we can ask a few questions about your health and progress. Based on your answers, a clinician may call you back to offer help and instructions. Please understand that early in the process of an illness or injury, an emergency department workup can be falsely reassuring. If you notice any worsening, changing or persistent symptoms please call your family doctor or return to the ER immediately. Tell us how we did during your visit at [email or web address]   and let us know about your experience

## 2022-11-14 NOTE — ED PROVIDER NOTES
[address]      Pt Name: Prema Cuevas  MRN: [id number]  [place] 1960  Date of evaluation: 11/14/2022      CHIEF COMPLAINT       Chief Complaint   Patient presents with    Dizziness     Patient advises that she has a history of vertigo. This episode has lasted about a week, today she felt as if she may pass out. Patient denies chest pain and difficulty breathing. Does complain of nausea. HISTORY OF PRESENT ILLNESS      The patient presents with dizziness. She has been diagnosed with vertigo before. This is been going on for about a week. She does not know if it starts to trigger it, but it is worsened by positioning such as getting out of bed. She says it is accompanied by a headache which is typical for her. The headache is in the front and back of her head. She denies focal weakness or numbness but says she has had some shaking in her hands. The patient reports mild nausea. She denies chest pain or shortness of breath. She says that [place] actually makes it worse. She is occasionally will take Dramamine for it but has not done so recently. She has not had any trauma or fever. REVIEW OF SYSTEMS       All systems reviewed and negative unless noted in HPI. The patient denies fever or constitutional symptoms. Denies vision change. Denies any sore throat or rhinorrhea. Denies any neck pain or stiffness. Denies chest pain or shortness of breath. Nausea with the dizziness. Denies any dysuria. Denies urinary frequency or hematuria. Denies musculoskeletal injury or pain. Denies any weakness, numbness or focal neurologic deficit. Dizziness as noted in HPI. Reports mild headache. History of vertigo. Denies any skin rash or edema. No recent psychiatric issues. No easy bruising or bleeding. Denies any polyuria, polydypsia or history of immunocompromise.        PAST MEDICAL HISTORY    has a past medical history of Anemia, DVT (deep venous thrombosis) ([facility] [address] History of depression, History of spontaneous , Hyperlipidemia, Hypertension, Morbid obesity ([facility] [address] and Vision abnormalities. SURGICAL HISTORY      has a past surgical history that includes Cholecystectomy (); hernia repair (Right, ); and Colonoscopy. CURRENT MEDICATIONS       Previous Medications    AMLODIPINE (NORVASC) 5 MG TABLET    Take 5 mg by mouth daily    APIXABAN (ELIQUIS) 5 MG TABS TABLET    Take 1 tablet by mouth 2 times daily    CITALOPRAM (CELEXA) 10 MG TABLET    TAKE 1 TABLET BY MOUTH EVERY DAY    HYDROCHLOROTHIAZIDE (HYDRODIURIL) 12.5 MG TABLET    TAKE 1 TABLET BY MOUTH EVERY DAY    LISINOPRIL (PRINIVIL;ZESTRIL) 10 MG TABLET    40 mg    MELATONIN 3 MG TABS TABLET    Take 3 mg by mouth nightly    ONDANSETRON (ZOFRAN) 4 MG TABLET    Take 4 mg by mouth every 8 hours as needed for Nausea or Vomiting       ALLERGIES     is allergic to codeine. FAMILY HISTORY     She indicated that her mother is alive. She indicated that her father is . She indicated that the status of her neg hx is unknown.     family history includes Heart Disease in her father and mother; Hypertension in her mother. SOCIAL HISTORY      reports that she has never smoked. She has never used smokeless tobacco. She reports current alcohol use. She reports that she does not use drugs. PHYSICAL EXAM     INITIAL VITALS:  height is 5' 4\" (1.626 m) and weight is 115.7 kg (255 lb). Her oral temperature is 98.4 °F (36.9 °C). Her blood pressure is 172/97 (abnormal) and her pulse is 100. Her respiration is 14 and oxygen saturation is 92%. The patient is alert and oriented, in no apparent distress. HEENT is atraumatic. Pupils are PERRL at 4 mm with normal extraocular motion. Nystagmus can be induced with turning head to the left with Hallpike maneuver. Mucous membranes moist.    Neck is supple with no lymphadenopathy. No JVD. No meningismus. Heart sounds regular rate and rhythm with no gallops, murmurs, or rubs. Lungs clear, no wheezes, rales or rhonchi. Abdomen: soft, nontender with no pain to palpation. Musculoskeletal exam shows no evidence of trauma. Normal distal pulses in all extremities. Skin: no rash or edema. Neurological exam reveals cranial nerves 2 through 12 grossly intact. Patient has equal  and normal deep tendon reflexes. Hallpike maneuver demonstrates positive results especially with head turn to the left. Psychiatric: no hallucinations or suicidal ideation. Lymphatics.:  No lymphadenopathy. DIFFERENTIAL DIAGNOSIS/ MDM:     Vertigo, CVA, ACS    DIAGNOSTIC RESULTS     EKG: All EKG's are interpreted by the Emergency Department Physician who either signs or Co-signs this chart in the absence of a cardiologist.    Sinus 95 with nonspecific ST change. Axis -21, , QRS 78, . No change compared to 9/22    RADIOLOGY:   I reviewed the radiologist interpretations:  CT HEAD WO CONTRAST   Final Result   Chronic microvascular disease without acute intracranial abnormality. Chronic sinusitis involving the ethmoid air cells. CT HEAD WO CONTRAST (Final result)  Result time 11/14/22 08:20:50  Final result by Darline Pritchard MD (11/14/22 08:20:50)                Impression:    Chronic microvascular disease without acute intracranial abnormality. Chronic sinusitis involving the ethmoid air cells. Narrative:    EXAMINATION:   CT OF THE HEAD WITHOUT CONTRAST  11/14/2022 8:05 am     TECHNIQUE:   CT of the head was performed without the administration of intravenous   contrast. Automated exposure control, iterative reconstruction, and/or weight   based adjustment of the mA/kV was utilized to reduce the radiation dose to as   low as reasonably achievable. COMPARISON:   CT brain performed 09/28/2022.      HISTORY:   ORDERING SYSTEM PROVIDED HISTORY: vertigo   TECHNOLOGIST PROVIDED HISTORY:     vertigo   Decision Support Exception - unselect if not a suspected or confirmed   emergency medical condition->Emergency Medical Condition (MA)   Reason for Exam: dizziness x 1 week     FINDINGS:   BRAIN/VENTRICLES: There is no acute intracranial hemorrhage, mass effect, or   midline shift. There is satisfactory overall gray-white matter   differentiation. There is chronic microvascular disease. The ventricular   structures are symmetric and unremarkable. The infratentorial structures are   unremarkable. ORBITS: The visualized portion of the orbits demonstrate no acute abnormality. SINUSES: The mastoid air cells are normally aerated. There is chronic   sinusitis involving the ethmoid air cells. SOFT TISSUES/SKULL:  No acute abnormality of the visualized skull or soft   tissues.                      LABS:  Results for orders placed or performed during the hospital encounter of 11/14/22   CBC with Auto Differential   Result Value Ref Range    WBC 6.3 3.5 - 11.0 k/uL    RBC 4.94 4.0 - 5.2 m/uL    Hemoglobin 16.5 (H) 12.0 - 16.0 g/dL    Hematocrit 49.0 (H) 36 - 46 %    MCV 99.2 80 - 100 fL    MCH 33.4 26 - 34 pg    MCHC 33.7 31 - 37 g/dL    RDW 16.7 (H) 12.5 - 15.4 %    Platelets 018 329 - 562 k/uL    MPV 7.3 6.0 - 12.0 fL    Seg Neutrophils 53 36 - 66 %    Lymphocytes 33 24 - 44 %    Monocytes 9 2 - 11 %    Eosinophils % 4 1 - 4 %    Basophils 1 0 - 2 %    Segs Absolute 3.40 1.8 - 7.7 k/uL    Absolute Lymph # 2.10 1.0 - 4.8 k/uL    Absolute Mono # 0.50 0.1 - 1.2 k/uL    Absolute Eos # 0.30 0.0 - 0.4 k/uL    Basophils Absolute 0.00 0.0 - 0.2 k/uL   Basic Metabolic Panel   Result Value Ref Range    Glucose 119 (H) 70 - 99 mg/dL    BUN 16 8 - 23 mg/dL    Creatinine 0.72 0.50 - 0.90 mg/dL    Est, Glom Filt Rate >60 >60 mL/min/1.73m2    Calcium 7.8 (L) 8.6 - 10.4 mg/dL    Sodium 139 135 - 144 mmol/L    Potassium 3.3 (L) 3.7 - 5.3 mmol/L    Chloride 99 98 - 107 mmol/L    CO2 28 20 - 31 mmol/L    Anion Gap 12 9 - 17 mmol/L   Troponin   Result Value Ref Range    Troponin, High Sensitivity 9 0 - 14 ng/L         EMERGENCY DEPARTMENT COURSE:   Vitals:    Vitals:    11/14/22 0730 11/14/22 0733 11/14/22 0734   BP: (!) 172/97     Pulse: 100 100    Resp: 14     Temp:   98.4 °F (36.9 °C)   TempSrc:   Oral   SpO2: 92%     Weight:   115.7 kg (255 lb)   Height:   5' 4\" (1.626 m)     -------------------------  BP: (!) 172/97, Temp: 98.4 °F (36.9 °C), Heart Rate: 100, Resp: 14      Re-evaluation Notes    The patient has medication to take at home for her symptoms. I think this is likely a repeat of her vertigo. There is no evidence of CVA or ACS. The patient can follow-up with her doctor. She is discharged in good condition. FINAL IMPRESSION      1. Vertigo          DISPOSITION/PLAN   DISPOSITION Decision To Discharge 11/14/2022 08:25:32 AM      Condition on Disposition    good    PATIENT REFERRED TO:  Manjeet Becker, CONCHIS - CNP  [address]  [address] [address]  [phone]      as scheduled    DISCHARGE MEDICATIONS:  New Prescriptions    No medications on file       (Please note that portions of this note were completed with a voice recognition program.  Efforts were made to edit the dictations but occasionally words are mis-transcribed.)    Tomasa Muñoz MD,, MD   Attending Emergency Physician         Bree Coronado MD  11/14/22 [id number]

## 2022-11-15 ENCOUNTER — OFFICE VISIT (OUTPATIENT)
Dept: FAMILY MEDICINE CLINIC | Age: 62
End: 2022-11-15
Payer: COMMERCIAL

## 2022-11-15 VITALS
SYSTOLIC BLOOD PRESSURE: 142 MMHG | WEIGHT: 264 LBS | DIASTOLIC BLOOD PRESSURE: 80 MMHG | RESPIRATION RATE: 16 BRPM | OXYGEN SATURATION: 95 % | BODY MASS INDEX: 45.32 KG/M2 | HEART RATE: 110 BPM | TEMPERATURE: 97.8 F

## 2022-11-15 DIAGNOSIS — Z12.31 ENCOUNTER FOR SCREENING MAMMOGRAM FOR BREAST CANCER: Primary | ICD-10-CM

## 2022-11-15 DIAGNOSIS — I10 PRIMARY HYPERTENSION: ICD-10-CM

## 2022-11-15 DIAGNOSIS — E66.01 MORBID OBESITY (HCC): ICD-10-CM

## 2022-11-15 DIAGNOSIS — Z00.00 PREVENTATIVE HEALTH CARE: ICD-10-CM

## 2022-11-15 DIAGNOSIS — H81.11 BENIGN PAROXYSMAL POSITIONAL VERTIGO OF RIGHT EAR: ICD-10-CM

## 2022-11-15 DIAGNOSIS — J32.2 CHRONIC ETHMOIDAL SINUSITIS: ICD-10-CM

## 2022-11-15 DIAGNOSIS — I82.452 ACUTE DEEP VEIN THROMBOSIS (DVT) OF LEFT PERONEAL VEIN (HCC): ICD-10-CM

## 2022-11-15 DIAGNOSIS — I26.99 BILATERAL PULMONARY EMBOLISM (HCC): ICD-10-CM

## 2022-11-15 DIAGNOSIS — Z12.4 CERVICAL CANCER SCREENING: ICD-10-CM

## 2022-11-15 PROBLEM — R11.2 NAUSEA VOMITING AND DIARRHEA: Status: RESOLVED | Noted: 2022-04-11 | Resolved: 2022-11-15

## 2022-11-15 PROBLEM — R19.7 NAUSEA VOMITING AND DIARRHEA: Status: RESOLVED | Noted: 2022-04-11 | Resolved: 2022-11-15

## 2022-11-15 PROCEDURE — 3078F DIAST BP <80 MM HG: CPT | Performed by: NURSE PRACTITIONER

## 2022-11-15 PROCEDURE — G8427 DOCREV CUR MEDS BY ELIG CLIN: HCPCS | Performed by: NURSE PRACTITIONER

## 2022-11-15 PROCEDURE — 3074F SYST BP LT 130 MM HG: CPT | Performed by: NURSE PRACTITIONER

## 2022-11-15 PROCEDURE — 3017F COLORECTAL CA SCREEN DOC REV: CPT | Performed by: NURSE PRACTITIONER

## 2022-11-15 PROCEDURE — 1036F TOBACCO NON-USER: CPT | Performed by: NURSE PRACTITIONER

## 2022-11-15 PROCEDURE — G8417 CALC BMI ABV UP PARAM F/U: HCPCS | Performed by: NURSE PRACTITIONER

## 2022-11-15 PROCEDURE — G8482 FLU IMMUNIZE ORDER/ADMIN: HCPCS | Performed by: NURSE PRACTITIONER

## 2022-11-15 PROCEDURE — 99205 OFFICE O/P NEW HI 60 MIN: CPT | Performed by: NURSE PRACTITIONER

## 2022-11-15 RX ORDER — LISINOPRIL AND HYDROCHLOROTHIAZIDE 12.5; 1 MG/1; MG/1
1 TABLET ORAL DAILY
Qty: 30 TABLET | Refills: 0 | Status: SHIPPED | OUTPATIENT
Start: 2022-11-15

## 2022-11-15 RX ORDER — AMLODIPINE BESYLATE 5 MG/1
5 TABLET ORAL DAILY
Qty: 30 TABLET | Refills: 0 | Status: SHIPPED | OUTPATIENT
Start: 2022-11-15

## 2022-11-15 RX ORDER — FLUTICASONE PROPIONATE 50 MCG
1 SPRAY, SUSPENSION (ML) NASAL DAILY
Qty: 32 G | Refills: 1 | Status: SHIPPED | OUTPATIENT
Start: 2022-11-15

## 2022-11-15 RX ORDER — ASPIRIN 81 MG/1
81 TABLET ORAL DAILY
COMMUNITY

## 2022-11-15 RX ORDER — LORATADINE 10 MG/1
10 TABLET ORAL DAILY
Qty: 30 TABLET | Refills: 0 | Status: SHIPPED | OUTPATIENT
Start: 2022-11-15 | End: 2022-12-15

## 2022-11-15 SDOH — ECONOMIC STABILITY: FOOD INSECURITY: WITHIN THE PAST 12 MONTHS, THE FOOD YOU BOUGHT JUST DIDN'T LAST AND YOU DIDN'T HAVE MONEY TO GET MORE.: NEVER TRUE

## 2022-11-15 SDOH — ECONOMIC STABILITY: FOOD INSECURITY: WITHIN THE PAST 12 MONTHS, YOU WORRIED THAT YOUR FOOD WOULD RUN OUT BEFORE YOU GOT MONEY TO BUY MORE.: NEVER TRUE

## 2022-11-15 ASSESSMENT — SOCIAL DETERMINANTS OF HEALTH (SDOH): HOW HARD IS IT FOR YOU TO PAY FOR THE VERY BASICS LIKE FOOD, HOUSING, MEDICAL CARE, AND HEATING?: NOT HARD AT ALL

## 2022-11-15 ASSESSMENT — ENCOUNTER SYMPTOMS
ABDOMINAL DISTENTION: 0
CONSTIPATION: 0
COUGH: 0
SORE THROAT: 0
CHEST TIGHTNESS: 0
BACK PAIN: 0
ABDOMINAL PAIN: 0
NAUSEA: 0
SHORTNESS OF BREATH: 0
RHINORRHEA: 0
COLOR CHANGE: 0
DIARRHEA: 0

## 2022-11-15 ASSESSMENT — PATIENT HEALTH QUESTIONNAIRE - PHQ9
SUM OF ALL RESPONSES TO PHQ QUESTIONS 1-9: 0
2. FEELING DOWN, DEPRESSED OR HOPELESS: 0
SUM OF ALL RESPONSES TO PHQ QUESTIONS 1-9: 0
SUM OF ALL RESPONSES TO PHQ9 QUESTIONS 1 & 2: 0
SUM OF ALL RESPONSES TO PHQ QUESTIONS 1-9: 0
SUM OF ALL RESPONSES TO PHQ QUESTIONS 1-9: 0
1. LITTLE INTEREST OR PLEASURE IN DOING THINGS: 0

## 2022-11-15 NOTE — PATIENT INSTRUCTIONS
always ask your healthcare professional. [place] [address] any warranty or liability for your use of this information.

## 2022-11-15 NOTE — PROGRESS NOTES
Becca Griffin, APRN-CNP  [facility] [address] [facility]  [id number] [address]  [address] [address]  Dept: [phone]  Dept Fax: [phone]     Patient ID: Sanjay Arias is a 65 y.o. female. HPI    Sanjay Arias is a 65 y.o. female New patient who presents to the office today for a first visit and to establish a relationship with a new primary care provider. Previous PCP: James King    Today, the patient complains of vertigo that was diagnosis in 2021, but has been worsening in the last month and she is having a lot of nausea. - in April got viral infection a week later leg swollen and unable to put shoe on, was told to take water pill, went to ER and was found to have PE and LLE blood clot. Was on eliquis just 60 days and then was taken off of it. Preventative care, female:  Last colonoscopy: 2016  Last mammogram: due  Last PAP: 10 years ago   Nicotine use: never  Alcohol use: yes occasional   Drug use: no  Dental exam: over a year   Eye exam: glasses 7 months ago    Specialists:  none  Previous office notes, labs, imaging and hospital records were reviewed prior to and during encounter. The patient's past medical, surgical, social, and family history as well as her current medications and allergies were reviewed as documented in today's encounter by DEDE Mckeon.       Current Outpatient Medications on File Prior to Visit   Medication Sig Dispense Refill    ondansetron (ZOFRAN) 4 MG tablet Take 4 mg by mouth every 8 hours as needed for Nausea or Vomiting      citalopram (CELEXA) 10 MG tablet TAKE 1 TABLET BY MOUTH EVERY DAY      lisinopril (PRINIVIL;ZESTRIL) 10 MG tablet 40 mg      hydroCHLOROthiazide (HYDRODIURIL) 12.5 MG tablet TAKE 1 TABLET BY MOUTH EVERY DAY      amLODIPine (NORVASC) 5 MG tablet Take 5 mg by mouth daily (Patient not taking: No sig reported)      melatonin 3 MG TABS tablet Take 3 mg by mouth nightly (Patient not taking: No sig reported)      apixaban (ELIQUIS) 5 MG TABS tablet Take 1 tablet by mouth 2 times daily (Patient not taking: No sig reported) 60 tablet 5     No current facility-administered medications on file prior to visit. Subjective:     Review of Systems   Constitutional:  Negative for activity change, fatigue and fever. HENT:  Negative for congestion, ear pain, rhinorrhea and sore throat. Respiratory:  Negative for cough, chest tightness and shortness of breath. Cardiovascular:  Negative for chest pain and palpitations. Gastrointestinal:  Negative for abdominal distention, abdominal pain, constipation, diarrhea and nausea. Endocrine: Negative for polydipsia, polyphagia and polyuria. Genitourinary:  Negative for difficulty urinating and dysuria. Musculoskeletal:  Negative for arthralgias, back pain and myalgias. Skin:  Negative for color change and rash. Neurological:  Positive for dizziness and headaches (dull with vertigo). Negative for weakness and light-headedness. Hematological:  Negative for adenopathy. Psychiatric/Behavioral:  Negative for agitation and behavioral problems. The patient is not nervous/anxious. Vitals:    11/15/22 1440   BP: (!) 150/77   Pulse: (!) 110   Resp: 16   Temp: 97.8 °F (36.6 °C)   SpO2: 95%       Objective:     Physical Exam  Vitals reviewed. Constitutional:       General: She is not in acute distress. Appearance: Normal appearance. HENT:      Head: Normocephalic and atraumatic. Right Ear: External ear normal.      Left Ear: External ear normal.      Nose: Nose normal.      Mouth/Throat:      Mouth: Mucous membranes are moist.      Pharynx: No oropharyngeal exudate or posterior oropharyngeal erythema. Eyes:      Extraocular Movements: Extraocular movements intact. Conjunctiva/sclera: Conjunctivae normal.      Pupils: Pupils are equal, round, and reactive to light.    Cardiovascular:      Rate and Rhythm: Normal rate and regular rhythm. Pulses: Normal pulses. Heart sounds: Normal heart sounds. No murmur heard. Pulmonary:      Effort: Pulmonary effort is normal. No respiratory distress. Breath sounds: Normal breath sounds. No wheezing or rales. Abdominal:      General: Bowel sounds are normal. There is no distension. Palpations: Abdomen is soft. Tenderness: There is no abdominal tenderness. Musculoskeletal:         General: Normal range of motion. Cervical back: Normal range of motion. Right lower leg: No edema. Left lower leg: No edema. Lymphadenopathy:      Cervical: No cervical adenopathy. Skin:     General: Skin is warm and dry. Neurological:      General: No focal deficit present. Mental Status: She is alert and oriented to person, place, and time. Deep Tendon Reflexes: Reflexes normal.      Comments: Right +    Psychiatric:         Mood and Affect: Mood normal.         Behavior: Behavior normal. Behavior is cooperative. Assessment:      Diagnosis Orders   1. Encounter for screening mammogram for breast cancer  [facility] DIGITAL SCREEN W OR WO CAD BILATERAL      2. Preventative health care  Comprehensive Metabolic Panel    Hemoglobin A1C    Lipid Panel    TSH with Reflex      3. Cervical cancer screening  [facility] Magdalena Farmer, [address] [facility]      4. Benign paroxysmal positional vertigo of right ear  [facility] - [place]      5. Bilateral pulmonary embolism (HCC)  Antithrombin 3 Activity    Cardiolipin Antibody, IgA    Homocysteine    MTHFR mutation    Protein C Antigen, Total    Protein S Antigen, Total    Prothrombin Gene Mutation    Lupus Anticoagulant    Protein S Activity    Protein C Functional    Factor 5 Leiden      6.  Acute deep vein thrombosis (DVT) of left peroneal vein (HCC)  Antithrombin 3 Activity    Cardiolipin Antibody, IgA    Homocysteine    MTHFR mutation    Protein C Antigen, Total    Protein S Antigen, Total Prothrombin Gene Mutation    Lupus Anticoagulant    Protein S Activity    Protein C Functional    Factor 5 Leiden      7. Primary hypertension  amLODIPine (NORVASC) 5 MG tablet    lisinopril-hydroCHLOROthiazide (PRINZIDE;ZESTORETIC) 10-12.5 MG per tablet      8. Chronic ethmoidal sinusitis  fluticasone (FLONASE) 50 MCG/ACT nasal spray    loratadine (CLARITIN) 10 MG tablet         Plan:     Encounter for screening mammogram for breast cancer  - [facility] DIGITAL SCREEN W OR WO CAD BILATERAL; Future    Cervical cancer screening  - [facility] Bruno Rosario, [facility], Gynecology, [place]    Benign paroxysmal positional vertigo of right ear  - Epley maneuver in patient instructions  - sleep in a recliner for 48 hours (allows canaliths (calcium debris) in the inner ear to settle down, limit movements that cause vertigo  - continue zofran as needed for nausea. - referral for vestibular therapy. - [facility] - [place]    Bilateral pulmonary embolism (HCC)  Acute deep vein thrombosis (DVT) of left peroneal vein (HCC)  - will get labs to further evaluate cause of blood clots. Primary hypertension  - Stable: Medication re-filled as needed, con't medications as prescribed, con't current tx plan  - Continue lisinopril and HCTZ as previously prescribed  - will restart amlodipine 5 mg daily   - Education provided on maintaining a low sodium diet and routine exercise. - Advised to seek emergent tx if SBP>180 and/or DBP>110, any chest pain, h/a or vision changes    Chronic ethmoidal sinusitis  - start Claritin and Flonase daily as discussed. Preventative health care  - We did discuss the recommended preventative screening guidelines including routine dental and eye exams.   - Detailed education was provided on the patient's after visit summary.  - Will order above noted labs and discuss them at follow up visit. - Referral OB/GYN as instructed for routine PAP.   - Annual mammograms as

## 2022-11-18 PROBLEM — H81.11 BENIGN PAROXYSMAL POSITIONAL VERTIGO OF RIGHT EAR: Status: ACTIVE | Noted: 2022-11-18

## 2022-11-30 ENCOUNTER — HOSPITAL ENCOUNTER (OUTPATIENT)
Age: 62
Setting detail: SPECIMEN
Discharge: HOME | End: 2022-11-30

## 2022-11-30 DIAGNOSIS — I26.99 BILATERAL PULMONARY EMBOLISM (HCC): ICD-10-CM

## 2022-11-30 DIAGNOSIS — Z00.00 PREVENTATIVE HEALTH CARE: ICD-10-CM

## 2022-11-30 DIAGNOSIS — I82.452 ACUTE DEEP VEIN THROMBOSIS (DVT) OF LEFT PERONEAL VEIN (HCC): ICD-10-CM

## 2022-11-30 LAB
ALBUMIN SERPL-MCNC: 3.1 G/DL (ref 3.5–5.2)
ALBUMIN/GLOBULIN RATIO: 0.9 (ref 1–2.5)
ALP BLD-CCNC: 119 U/L (ref 35–104)
ALT SERPL-CCNC: 20 U/L (ref 5–33)
ANION GAP SERPL CALCULATED.3IONS-SCNC: 12 MMOL/L (ref 9–17)
AST SERPL-CCNC: 30 U/L
BILIRUB SERPL-MCNC: 1.8 MG/DL (ref 0.3–1.2)
BUN BLDV-MCNC: 16 MG/DL (ref 8–23)
CALCIUM SERPL-MCNC: 7.7 MG/DL (ref 8.6–10.4)
CHLORIDE BLD-SCNC: 95 MMOL/L (ref 98–107)
CHOLESTEROL/HDL RATIO: 4.4
CHOLESTEROL: 180 MG/DL
CO2: 30 MMOL/L (ref 20–31)
CREAT SERPL-MCNC: 0.88 MG/DL (ref 0.5–0.9)
ESTIMATED AVERAGE GLUCOSE: 100 MG/DL
GFR SERPL CREATININE-BSD FRML MDRD: >60 ML/MIN/1.73M2
GLUCOSE BLD-MCNC: 84 MG/DL (ref 70–99)
HBA1C MFR BLD: 5.1 % (ref 4–6)
HDLC SERPL-MCNC: 41 MG/DL
HOMOCYSTEINE: 64.9 UMOL/L
LDL CHOLESTEROL: 107 MG/DL (ref 0–130)
POTASSIUM SERPL-SCNC: 3.4 MMOL/L (ref 3.7–5.3)
SODIUM BLD-SCNC: 137 MMOL/L (ref 135–144)
TOTAL PROTEIN: 6.7 G/DL (ref 6.4–8.3)
TRIGL SERPL-MCNC: 160 MG/DL
TSH SERPL DL<=0.05 MIU/L-ACNC: 4.39 UIU/ML (ref 0.3–5)

## 2022-12-01 LAB — AT-III ACTIVITY: 75 % (ref 83–122)

## 2022-12-02 LAB
ANTICARDIOLIPIN IGA ANTIBODY: 2.8 APL (ref 0–14)
ANTICARDIOLIPIN IGG ANTIBODY: 1.8 GPL (ref 0–10)
CARDIOLIPIN AB IGM: 1.1 MPL (ref 0–10)
DILUTE RUSSELL VIPER VENOM TIME: ABNORMAL
INR BLD: 1.2
LUPUS ANTICOAG: ABNORMAL
PARTIAL THROMBOPLASTIN TIME: 25 SEC (ref 20.5–30.5)
PROTEIN C ACTIVITY: 92 %
PROTHROMBIN TIME: 12.9 SEC (ref 9.1–12.3)

## 2022-12-03 LAB
PROTEIN C ANTIGEN: 87 % (ref 63–153)
PROTEIN S ANTIGEN, TOTAL: 125 % (ref 63–126)

## 2022-12-04 LAB — PROTEIN S ACTIVITY: 95 % (ref 59–130)

## 2022-12-07 ENCOUNTER — OFFICE VISIT (OUTPATIENT)
Dept: FAMILY MEDICINE CLINIC | Age: 62
End: 2022-12-07
Payer: COMMERCIAL

## 2022-12-07 VITALS
DIASTOLIC BLOOD PRESSURE: 96 MMHG | TEMPERATURE: 97.2 F | SYSTOLIC BLOOD PRESSURE: 176 MMHG | WEIGHT: 270 LBS | RESPIRATION RATE: 16 BRPM | BODY MASS INDEX: 46.35 KG/M2 | HEART RATE: 98 BPM | OXYGEN SATURATION: 95 %

## 2022-12-07 DIAGNOSIS — E72.11 HYPERHOMOCYSTEINEMIA (HCC): ICD-10-CM

## 2022-12-07 DIAGNOSIS — I26.99 BILATERAL PULMONARY EMBOLISM (HCC): ICD-10-CM

## 2022-12-07 DIAGNOSIS — I82.452 ACUTE DEEP VEIN THROMBOSIS (DVT) OF LEFT PERONEAL VEIN (HCC): ICD-10-CM

## 2022-12-07 DIAGNOSIS — D68.59 ANTITHROMBIN III DEFICIENCY (HCC): ICD-10-CM

## 2022-12-07 DIAGNOSIS — E83.51 HYPOCALCEMIA: Primary | ICD-10-CM

## 2022-12-07 DIAGNOSIS — R11.0 NAUSEA: ICD-10-CM

## 2022-12-07 DIAGNOSIS — I10 PRIMARY HYPERTENSION: ICD-10-CM

## 2022-12-07 DIAGNOSIS — E66.01 MORBID OBESITY (HCC): ICD-10-CM

## 2022-12-07 DIAGNOSIS — E87.6 HYPOKALEMIA: ICD-10-CM

## 2022-12-07 DIAGNOSIS — J32.2 CHRONIC ETHMOIDAL SINUSITIS: ICD-10-CM

## 2022-12-07 PROCEDURE — 99215 OFFICE O/P EST HI 40 MIN: CPT | Performed by: NURSE PRACTITIONER

## 2022-12-07 PROCEDURE — 3017F COLORECTAL CA SCREEN DOC REV: CPT | Performed by: NURSE PRACTITIONER

## 2022-12-07 PROCEDURE — G8427 DOCREV CUR MEDS BY ELIG CLIN: HCPCS | Performed by: NURSE PRACTITIONER

## 2022-12-07 PROCEDURE — G8482 FLU IMMUNIZE ORDER/ADMIN: HCPCS | Performed by: NURSE PRACTITIONER

## 2022-12-07 PROCEDURE — 1036F TOBACCO NON-USER: CPT | Performed by: NURSE PRACTITIONER

## 2022-12-07 PROCEDURE — G8417 CALC BMI ABV UP PARAM F/U: HCPCS | Performed by: NURSE PRACTITIONER

## 2022-12-07 PROCEDURE — 3074F SYST BP LT 130 MM HG: CPT | Performed by: NURSE PRACTITIONER

## 2022-12-07 PROCEDURE — 3078F DIAST BP <80 MM HG: CPT | Performed by: NURSE PRACTITIONER

## 2022-12-07 RX ORDER — ONDANSETRON 4 MG/1
4 TABLET, FILM COATED ORAL EVERY 8 HOURS PRN
Qty: 20 TABLET | Refills: 0 | Status: SHIPPED | OUTPATIENT
Start: 2022-12-07 | End: 2023-12-07

## 2022-12-07 RX ORDER — POTASSIUM CHLORIDE 750 MG/1
10 TABLET, EXTENDED RELEASE ORAL DAILY
Qty: 30 TABLET | Refills: 5 | Status: SHIPPED | OUTPATIENT
Start: 2022-12-07

## 2022-12-07 RX ORDER — AMLODIPINE BESYLATE 10 MG/1
10 TABLET ORAL DAILY
Qty: 90 TABLET | Refills: 1 | Status: SHIPPED | OUTPATIENT
Start: 2022-12-07

## 2022-12-07 ASSESSMENT — ENCOUNTER SYMPTOMS
BACK PAIN: 0
CHEST TIGHTNESS: 0
SORE THROAT: 0
RHINORRHEA: 0
SHORTNESS OF BREATH: 0
COUGH: 0
NAUSEA: 1
ABDOMINAL DISTENTION: 0
ABDOMINAL PAIN: 1
DIARRHEA: 1
COLOR CHANGE: 0
CONSTIPATION: 0

## 2022-12-07 NOTE — PROGRESS NOTES
Saulo Salvador, APRN-[facility]  [id number]  [facility] [address]  Dept: [phone]  Dept Fax: [phone]     Patient ID: Mariluz Velázquez is a 65 y.o. female Established patient    HPI    Pt here today for f/u on chronic medical problems, go over labs and/or diagnostic studies, and medication refills. Pt denies any fever or chills. Pt today denies any HA, chest pain, or SOB. Pt denies any N/V/D/C or abdominal pain. - stomach bug since Monday, a lot of diarrhea no appetite, she had toast this morning and it went right through, denies foul smell.     - at home BP is averaging 132/85 in the morning     Otherwise pt doing well on current tx and no other concerns today. The patient's past medical, surgical, social, and family history as well as his current medications and allergies were reviewed as documented in today's encounter DEDE Spicer. Previous office notes, labs, imaging and hospital records were reviewed prior to and during encounter. Current Outpatient Medications on File Prior to Visit   Medication Sig Dispense Refill    amLODIPine (NORVASC) 5 MG tablet Take 1 tablet by mouth daily 30 tablet 0    lisinopril-hydroCHLOROthiazide (PRINZIDE;ZESTORETIC) 10-12.5 MG per tablet Take 1 tablet by mouth daily 30 tablet 0    fluticasone (FLONASE) 50 MCG/ACT nasal spray 1 spray by Each Nostril route daily 32 g 1    loratadine (CLARITIN) 10 MG tablet Take 1 tablet by mouth daily 30 tablet 0    aspirin 81 MG EC tablet Take 81 mg by mouth daily      ondansetron (ZOFRAN) 4 MG tablet Take 4 mg by mouth every 8 hours as needed for Nausea or Vomiting      citalopram (CELEXA) 10 MG tablet TAKE 1 TABLET BY MOUTH EVERY DAY       No current facility-administered medications on file prior to visit. Subjective:     Review of Systems   Constitutional:  Negative for activity change, fatigue and fever.    HENT:  Negative for congestion, ear pain, rhinorrhea and sore throat. Respiratory:  Negative for cough, chest tightness and shortness of breath. Cardiovascular:  Negative for chest pain and palpitations. Gastrointestinal:  Positive for abdominal pain, diarrhea and nausea. Negative for abdominal distention and constipation. Endocrine: Negative for polydipsia, polyphagia and polyuria. Genitourinary:  Negative for difficulty urinating and dysuria. Musculoskeletal:  Negative for arthralgias, back pain and myalgias. Skin:  Negative for color change and rash. Neurological:  Negative for dizziness, weakness, light-headedness and headaches. Hematological:  Negative for adenopathy. Psychiatric/Behavioral:  Negative for agitation and behavioral problems. The patient is not nervous/anxious. Vitals:    12/07/22 1605   BP: (!) 176/96   Pulse:    Resp:    Temp:    SpO2:        Objective:     Physical Exam  Vitals reviewed. Constitutional:       General: She is not in acute distress. Appearance: Normal appearance. HENT:      Head: Normocephalic and atraumatic. Right Ear: External ear normal.      Left Ear: External ear normal.      Nose: Nose normal.      Mouth/Throat:      Mouth: Mucous membranes are moist.      Pharynx: No oropharyngeal exudate or posterior oropharyngeal erythema. Eyes:      Extraocular Movements: Extraocular movements intact. Conjunctiva/sclera: Conjunctivae normal.      Pupils: Pupils are equal, round, and reactive to light. Cardiovascular:      Rate and Rhythm: Normal rate and regular rhythm. Pulses: Normal pulses. Heart sounds: Normal heart sounds. No murmur heard. Pulmonary:      Effort: Pulmonary effort is normal. No respiratory distress. Breath sounds: Normal breath sounds. No wheezing or rales. Abdominal:      General: Bowel sounds are normal. There is no distension. Palpations: Abdomen is soft. Tenderness: There is no abdominal tenderness.    Musculoskeletal: General: Normal range of motion. Cervical back: Normal range of motion. Right lower leg: No edema. Left lower leg: No edema. Lymphadenopathy:      Cervical: No cervical adenopathy. Skin:     General: Skin is warm and dry. Neurological:      General: No focal deficit present. Mental Status: She is alert and oriented to person, place, and time. Deep Tendon Reflexes: Reflexes normal.   Psychiatric:         Mood and Affect: Mood normal. Affect is tearful. Behavior: Behavior normal. Behavior is cooperative. Assessment:      Diagnosis Orders   1. Hypocalcemia  PTH, Intact with Ionized Calcium    Vitamin D 25 Hydroxy    Magnesium    Phosphorus      2. 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 Bilateral pulmonary embolism [facility])  Filemon Enriquez MD, Hematology/Oncology, [place]    apixaban (ELIQUIS) 5 MG TABS tablet      5. Acute deep vein thrombosis (DVT) of left peroneal vein (HCC)  apixaban (ELIQUIS) 5 MG TABS tablet      6. Primary hypertension  amLODIPine (NORVASC) 10 MG tablet      7. Hypokalemia  potassium chloride (KLOR-CON M) 10 MEQ extended release tablet    Potassium      8. Morbid obesity ([address]        9. Nausea  ondansetron (ZOFRAN) 4 MG tablet        Plan:     Bilateral pulmonary embolism (HCC)  Acute deep vein thrombosis (DVT) of left peroneal vein (HCC)  Antithrombin III deficiency (HCC)  - will start Eliquis 5 mg BID. - referral for hematology given. - Patient instructed to monitor for s/s of bleeding while taking Eliquis. Hypocalcemia  Hyperhomocysteinemia ([address]  - will get more labs work to further evaluate cause.      Primary hypertension  - Stable: Medication re-filled as needed, con't medications as prescribed, con't current tx plan  - Continue lisinopril and HCTZ as previously prescribed  - increase amlodipine 10 mg daily   - Education provided on maintaining a low sodium diet and routine exercise. - Advised to seek emergent tx if SBP>180 and/or DBP>110, any chest pain, h/a or vision changes    Hypokalemia  - will start potassium 10 meq daily. - increase foods high in potassium in diet such as the following: beans, dark leafy greens, potatoes, squash, yogurt, fish, avocados, mushrooms, and bananas. Nausea  - rest and increase fluids. - Zofran as needed for nausea. - call if symptoms worsen or do not improve. - will give letter to return to work on Monday December 12th.      - reviewed all labs with patient and discussed findings and plan of care. Return in about 3 weeks (around 12/28/2022) for f/u with labs . - pt verbalized understanding plan of care. Medications, labs, diagnostic studies, consultations and follow-up as documented in this encounter. Rest of systems unchanged, continue current treatments    On this date 12/7/2022 I have spent 40 minutes reviewing previous notes, test results and face to face with the patient discussing the diagnosis and importance of compliance with the treatment plan as well as documenting on the day of the visit.     CONCHIS Pizarro-CNP

## 2022-12-07 NOTE — LETTER
[address] [facility]  [id number] [facility] [address] [facility]  [address] [address]  Phone: [phone]  Fax: [phone]    CONCHIS Sharma CNP        December 7, 2022     Patient: Hosea Primrose   YOB: 1960   Date of Visit: 12/7/2022       To Whom it May Concern:    Andrea Alexandre was seen in my clinic on 12/7/2022. She may return to work on Monday December 12, 2022. If you have any questions or concerns, please don't hesitate to call.     Sincerely,           CONCHIS Sharma CNP

## 2022-12-08 DIAGNOSIS — I10 PRIMARY HYPERTENSION: ICD-10-CM

## 2022-12-08 RX ORDER — LISINOPRIL AND HYDROCHLOROTHIAZIDE 12.5; 1 MG/1; MG/1
TABLET ORAL
Qty: 30 TABLET | Refills: 0 | Status: SHIPPED | OUTPATIENT
Start: 2022-12-08

## 2022-12-08 RX ORDER — LORATADINE 10 MG/1
TABLET ORAL
Qty: 90 TABLET | Refills: 1 | Status: SHIPPED | OUTPATIENT
Start: 2022-12-08

## 2022-12-08 NOTE — TELEPHONE ENCOUNTER
Please Approve or Refuse.   Send to Pharmacy per Pt's Request:      Next Visit Date:  12/28/2022   Last Visit Date: 12/7/2022    Hemoglobin A1C (%)   Date Value   11/30/2022 5.1   10/22/2013 5.9             ( goal A1C is < 7)   BP Readings from Last 3 Encounters:   12/07/22 (!) 176/96   11/15/22 (!) 142/80   11/14/22 (!) 152/80          (goal 120/80)  BUN   Date Value Ref Range Status   11/30/2022 16 8 - 23 mg/dL Final     Creatinine   Date Value Ref Range Status   11/30/2022 0.88 0.50 - 0.90 mg/dL Final     Potassium   Date Value Ref Range Status   11/30/2022 3.4 (L) 3.7 - 5.3 mmol/L Final

## 2022-12-26 DIAGNOSIS — I82.452 ACUTE DEEP VEIN THROMBOSIS (DVT) OF LEFT PERONEAL VEIN (HCC): ICD-10-CM

## 2022-12-26 DIAGNOSIS — I26.99 BILATERAL PULMONARY EMBOLISM (HCC): ICD-10-CM

## 2022-12-26 DIAGNOSIS — I10 PRIMARY HYPERTENSION: ICD-10-CM

## 2022-12-27 RX ORDER — LISINOPRIL AND HYDROCHLOROTHIAZIDE 12.5; 1 MG/1; MG/1
TABLET ORAL
Qty: 90 TABLET | Refills: 1 | Status: SHIPPED | OUTPATIENT
Start: 2022-12-27

## 2022-12-27 RX ORDER — APIXABAN 5 MG/1
TABLET, FILM COATED ORAL
Qty: 180 TABLET | Refills: 1 | Status: SHIPPED | OUTPATIENT
Start: 2022-12-27

## 2022-12-27 NOTE — TELEPHONE ENCOUNTER
Please Approve or Refuse.   Send to Pharmacy per Pt's Request:      Next Visit Date:  1/4/2023   Last Visit Date: 12/7/2022    Hemoglobin A1C (%)   Date Value   11/30/2022 5.1   10/22/2013 5.9             ( goal A1C is < 7)   BP Readings from Last 3 Encounters:   12/07/22 (!) 176/96   11/15/22 (!) 142/80   11/14/22 (!) 152/80          (goal 120/80)  BUN   Date Value Ref Range Status   11/30/2022 16 8 - 23 mg/dL Final     Creatinine   Date Value Ref Range Status   11/30/2022 0.88 0.50 - 0.90 mg/dL Final     Potassium   Date Value Ref Range Status   11/30/2022 3.4 (L) 3.7 - 5.3 mmol/L Final

## 2023-01-03 ENCOUNTER — TELEPHONE (OUTPATIENT)
Dept: FAMILY MEDICINE CLINIC | Age: 63
End: 2023-01-03

## 2023-01-03 ENCOUNTER — HOSPITAL ENCOUNTER (EMERGENCY)
Age: 63
Discharge: HOME | End: 2023-01-03
Attending: EMERGENCY MEDICINE
Payer: COMMERCIAL

## 2023-01-03 ENCOUNTER — APPOINTMENT (OUTPATIENT)
Dept: GENERAL RADIOLOGY | Age: 63
End: 2023-01-03
Payer: COMMERCIAL

## 2023-01-03 ENCOUNTER — HOSPITAL ENCOUNTER (OUTPATIENT)
Age: 63
Setting detail: SPECIMEN
Discharge: HOME | End: 2023-01-03

## 2023-01-03 VITALS
BODY MASS INDEX: 42.68 KG/M2 | DIASTOLIC BLOOD PRESSURE: 73 MMHG | HEIGHT: 64 IN | RESPIRATION RATE: 18 BRPM | HEART RATE: 103 BPM | OXYGEN SATURATION: 96 % | TEMPERATURE: 98.3 F | SYSTOLIC BLOOD PRESSURE: 127 MMHG | WEIGHT: 250 LBS

## 2023-01-03 DIAGNOSIS — E72.11 HYPERHOMOCYSTEINEMIA (HCC): ICD-10-CM

## 2023-01-03 DIAGNOSIS — E83.51 HYPOCALCEMIA: ICD-10-CM

## 2023-01-03 DIAGNOSIS — S80.01XA CONTUSION OF RIGHT KNEE, INITIAL ENCOUNTER: Primary | ICD-10-CM

## 2023-01-03 DIAGNOSIS — E87.6 HYPOKALEMIA: ICD-10-CM

## 2023-01-03 LAB
CALCIUM IONIZED: 1.01 MMOL/L (ref 1.13–1.33)
FOLATE: 4.2 NG/ML
MAGNESIUM: 1 MG/DL (ref 1.6–2.6)
PHOSPHORUS: 3.7 MG/DL (ref 2.6–4.5)
POTASSIUM SERPL-SCNC: 4.4 MMOL/L (ref 3.7–5.3)
PTH INTACT: 95.7 PG/ML (ref 14–72)
VITAMIN B-12: 179 PG/ML (ref 232–1245)
VITAMIN D 25-HYDROXY: 11.3 NG/ML

## 2023-01-03 PROCEDURE — 99283 EMERGENCY DEPT VISIT LOW MDM: CPT

## 2023-01-03 PROCEDURE — 73562 X-RAY EXAM OF KNEE 3: CPT

## 2023-01-03 ASSESSMENT — PAIN - FUNCTIONAL ASSESSMENT: PAIN_FUNCTIONAL_ASSESSMENT: 0-10

## 2023-01-03 ASSESSMENT — PAIN SCALES - GENERAL: PAINLEVEL_OUTOF10: 4

## 2023-01-03 ASSESSMENT — PAIN DESCRIPTION - LOCATION: LOCATION: KNEE

## 2023-01-03 ASSESSMENT — PAIN DESCRIPTION - ORIENTATION: ORIENTATION: RIGHT

## 2023-01-03 NOTE — Clinical Note
Bobby Lei was seen and treated in our emergency department on 1/3/2023. She may return to work on 01/04/2023. If you have any questions or concerns, please don't hesitate to call.       Waynard Kanner, MD

## 2023-01-03 NOTE — ED PROVIDER NOTES
[address]      Pt Name: Day Bass  MRN: [id number]  [place] 1960  Date of evaluation: 1/3/2023  Provider: Thiago Mariano MD    CHIEF COMPLAINT     Chief Complaint   Patient presents with    Fall    Knee Injury         HISTORY OF PRESENT ILLNESS   (Location/Symptom, Timing/Onset, Context/Setting,Quality, Duration, Modifying Factors, Severity)  Note limiting factors. Day Bass is a 65 y.o. female who presents to the emergency department for evaluation of a fall. Patient was over at another part of the hospital getting outpatient blood work done. She had been fasting for this. She states after getting her blood drawn she was walking down the hallway when she stumbled and fell. She landed on her knees but has pain in the right knee only. With assistance she was able to get up and has been able to bear weight on the knee. She denies any other area of injury and she denies syncope. She attributes her lightheadedness to her fasting state. Patient was diagnosed with bilateral DVT and pulmonary embolism earlier last year and is on anticoagulant therapy pending hematology work-up which is anticipated in the near future. The history is provided by the patient. Nursing Notes werereviewed. REVIEW OF SYSTEMS    (2-9 systems for level 4, 10 or more for level 5)     Review of Systems    Except as noted above the remainder of the review of systems was reviewed and negative.        PAST MEDICAL HISTORY     Past Medical History:   Diagnosis Date    Anemia 10/23/2013    DVT (deep venous thrombosis) ([facility] [address]     History of depression     History of spontaneous      Hyperlipidemia     Hypertension     Morbid obesity ([facility] [address] 2022    Vision abnormalities          SURGICALHISTORY       Past Surgical History:   Procedure Laterality Date    CHOLECYSTECTOMY      [address] Right [address]83 CURRENT MEDICATIONS       Discharge Medication List as of 1/3/2023  9:01 AM        CONTINUE these medications which have NOT CHANGED    Details   lisinopril-hydroCHLOROthiazide (PRINZIDE;ZESTORETIC) 10-12.5 MG per tablet TAKE 1 TABLET BY MOUTH EVERY DAY, Disp-90 tablet, R-1Normal      ELIQUIS 5 MG TABS tablet Take 1 tablet by mouth 2 times daily, Disp-180 tablet, R-1Normal      loratadine (CLARITIN) 10 MG tablet TAKE 1 TABLET BY MOUTH EVERY DAY, Disp-90 tablet, R-1Normal      potassium chloride (KLOR-CON M) 10 MEQ extended release tablet Take 1 tablet by mouth daily, Disp-30 tablet, R-5Normal      amLODIPine (NORVASC) 10 MG tablet Take 1 tablet by mouth daily, Disp-90 tablet, R-1Normal      ondansetron (ZOFRAN) 4 MG tablet Take 1 tablet by mouth every 8 hours as needed for Nausea or Vomiting, Disp-20 tablet, R-0Normal      fluticasone (FLONASE) 50 MCG/ACT nasal spray 1 spray by Each Nostril route daily, Disp-32 g, R-1Normal      aspirin 81 MG EC tablet Take 81 mg by mouth dailyHistorical Med      citalopram (CELEXA) 10 MG tablet TAKE 1 TABLET BY MOUTH EVERY DAYHistorical Med             ALLERGIES     Codeine    FAMILY HISTORY       Family History   Problem Relation Age of Onset    Heart Disease Father     Hypertension Mother     Heart Disease Mother     Breast Cancer Neg Hx     Cancer Neg Hx     Colon Cancer Neg Hx     Diabetes Neg Hx     Eclampsia Neg Hx     Ovarian Cancer Neg Hx      Labor Neg Hx     Spont Abortions Neg Hx     Stroke Neg Hx           SOCIAL HISTORY       Social History     Socioeconomic History    Marital status:      Spouse name: None    Number of children: None    Years of education: None    Highest education level: None   Tobacco Use    Smoking status: Never    Smokeless tobacco: Never   Vaping Use    Vaping Use: Never used   Substance and Sexual Activity    Alcohol use: Yes     Comment: Occassional    Drug use: No    Sexual activity: Never     Partners: Female     Social Determinants of Health     Financial Resource Strain: Low Risk     Difficulty of Paying Living Expenses: Not hard at all   Food Insecurity: No Food Insecurity    Worried About [address] [facility] in the Last Year: Never true    [facility] in the Last Year: Never true       SCREENINGS    [place] Coma Scale  Eye Opening: Spontaneous  Best Verbal Response: Oriented  Best Motor Response: Obeys commands  Sandra Coma Scale Score: 15        PHYSICAL EXAM    (up to 7 for level 4, 8 or more for level 5)     ED Triage Vitals   BP Temp Temp src Pulse Resp SpO2 Height Weight   -- -- -- -- -- -- -- --       Physical Exam  Constitutional:       General: She is not in acute distress. Appearance: She is not ill-appearing. HENT:      Head: Atraumatic. Right Ear: External ear normal.      Left Ear: External ear normal.      Nose: Nose normal.   Eyes:      Extraocular Movements: Extraocular movements intact. Pupils: Pupils are equal, round, and reactive to light. Cardiovascular:      Rate and Rhythm: Normal rate and regular rhythm. Pulmonary:      Effort: Pulmonary effort is normal. No respiratory distress. Breath sounds: Normal breath sounds. Abdominal:      Palpations: Abdomen is soft. Musculoskeletal:      Cervical back: Neck supple. No tenderness. Right lower leg: No edema. Left lower leg: No edema. Comments: Examination of the right knee does not reveal redness or warmth or joint effusion. Range of motion is full and free. Patient does not have tenderness directly over the patella and only over the superior border of the tibia. No bruising is noted. Neurovascular function is intact distally. The left leg is atraumatic. Skin:     General: Skin is warm and dry. Coloration: Skin is not pale. Neurological:      General: No focal deficit present. Mental Status: She is alert and oriented to person, place, and time.        DIAGNOSTIC RESULTS     EKG: All EKG's are interpreted by the Emergency Department Physician who either signs orCo-signs this chart in the absence of a cardiologist.    RADIOLOGY:     Interpretation per the Radiologist below, ifavailable at the time of this note:    XR KNEE RIGHT (3 VIEWS)   Final Result   1. No acute osseous abnormality identified. 2.  Mild degenerative change in the medial compartment and trace knee   effusion. ED BEDSIDE ULTRASOUND:   Performed by ED Physician - none    LABS:  Labs Reviewed - No data to display    All other labs were within normal range ornot returned as of this dictation. EMERGENCY DEPARTMENT COURSE and DIFFERENTIAL DIAGNOSIS/MDM:   Vitals:    Vitals:    01/03/23 0823   BP: 127/73   Pulse: (!) 103   Resp: 18   Temp: 98.3 °F (36.8 °C)   TempSrc: Oral   SpO2: 96%   Weight: 113.4 kg (250 lb)   Height: 5' 4\" (1.626 m)            Knee x-rays reveal mild degenerative changes with no fracture. Patient is able to get up off the cart and walk without any difficulty or limp. She is discharged in stable condition. Follow-up is as required and she may return anytime for worsening symptoms. MDM    CONSULTS:  None    PROCEDURES:  Unlessotherwise noted below, none     Procedures    FINAL IMPRESSION      1. Contusion of right knee, initial encounter          DISPOSITION/PLAN   DISPOSITION Decision To Discharge 01/03/2023 09:00:25 AM      PATIENT REFERRED TO:  CONCHIS Torres - CNP  [address]  [address] [place] [address]  [phone]          DISCHARGE MEDICATIONS:         Problem List:  Patient Active Problem List   Diagnosis Code    Hypertension I10    Hyperlipidemia E78.5    History of depression Z86.59    Vision abnormalities H53.9    Anemia D64.9    Hypokalemia E87.6    Bilateral pulmonary embolism (HCC) I26.99    DVT (deep venous thrombosis) ([facility]) I82.409    Morbid obesity ([facility]) E66.01    Benign paroxysmal positional vertigo of right ear H81.11           Summation      Patient Course: Discharged    ED Medicationsadministered this visit:  Medications - No data to display    New Prescriptions from this visit:    Discharge Medication List as of 1/3/2023  9:01 AM          Follow-up:  CONCHIS Bird - CNP  [address] [facility]  [phone]            Final Impression:   1.  Contusion of right knee, initial encounter               (Please note that portions of this note were completed with a voice recognitionprogram.  Efforts were made to edit the dictations but occasionally words are mis-transcribed.)    Sarah Hooker MD (electronically signed)  Attending Emergency Physician            Sarah Hooker MD  01/03/23 6949

## 2023-01-03 NOTE — TELEPHONE ENCOUNTER
Patient fell in [facility] hospital hallway right outside of lab door after getting blood work on 01/03/2023. [address] (Marli Rhodes) found pt, and got her  and they took her to ER to be evaluated. Patient did state her knee was bothering her - was red and swollen. Patient was light headed.

## 2023-01-04 ENCOUNTER — OFFICE VISIT (OUTPATIENT)
Dept: FAMILY MEDICINE CLINIC | Age: 63
End: 2023-01-04

## 2023-01-04 VITALS
WEIGHT: 270 LBS | BODY MASS INDEX: 46.35 KG/M2 | OXYGEN SATURATION: 98 % | HEART RATE: 108 BPM | RESPIRATION RATE: 16 BRPM | DIASTOLIC BLOOD PRESSURE: 82 MMHG | TEMPERATURE: 97 F | SYSTOLIC BLOOD PRESSURE: 126 MMHG

## 2023-01-04 DIAGNOSIS — E34.9 ELEVATED PARATHYROID HORMONE: ICD-10-CM

## 2023-01-04 DIAGNOSIS — E87.6 HYPOKALEMIA: ICD-10-CM

## 2023-01-04 DIAGNOSIS — E72.11 HYPERHOMOCYSTEINEMIA (HCC): ICD-10-CM

## 2023-01-04 DIAGNOSIS — E78.2 MIXED HYPERLIPIDEMIA: ICD-10-CM

## 2023-01-04 DIAGNOSIS — E53.8 B12 DEFICIENCY: ICD-10-CM

## 2023-01-04 DIAGNOSIS — E83.51 HYPOCALCEMIA: ICD-10-CM

## 2023-01-04 DIAGNOSIS — E55.9 VITAMIN D DEFICIENCY: ICD-10-CM

## 2023-01-04 DIAGNOSIS — D68.59 ANTITHROMBIN III DEFICIENCY (HCC): ICD-10-CM

## 2023-01-04 DIAGNOSIS — E83.42 HYPOMAGNESEMIA: Primary | ICD-10-CM

## 2023-01-04 RX ORDER — FOLIC ACID 0.8 MG
1 TABLET ORAL DAILY
Qty: 30 CAPSULE | Refills: 2 | Status: SHIPPED | OUTPATIENT
Start: 2023-01-04

## 2023-01-04 RX ORDER — ERGOCALCIFEROL 1.25 MG/1
50000 CAPSULE ORAL WEEKLY
Qty: 4 CAPSULE | Refills: 3 | Status: SHIPPED | OUTPATIENT
Start: 2023-01-04

## 2023-01-04 ASSESSMENT — PATIENT HEALTH QUESTIONNAIRE - PHQ9
SUM OF ALL RESPONSES TO PHQ QUESTIONS 1-9: 0
1. LITTLE INTEREST OR PLEASURE IN DOING THINGS: 0
SUM OF ALL RESPONSES TO PHQ9 QUESTIONS 1 & 2: 0
SUM OF ALL RESPONSES TO PHQ QUESTIONS 1-9: 0
2. FEELING DOWN, DEPRESSED OR HOPELESS: 0

## 2023-01-04 ASSESSMENT — ENCOUNTER SYMPTOMS
BACK PAIN: 0
ABDOMINAL DISTENTION: 0
COUGH: 0
DIARRHEA: 1
COLOR CHANGE: 0
CONSTIPATION: 0
SHORTNESS OF BREATH: 0
NAUSEA: 1
SORE THROAT: 0
CHEST TIGHTNESS: 0
RHINORRHEA: 0
ABDOMINAL PAIN: 1

## 2023-01-04 NOTE — PROGRESS NOTES
Monster Collins, APRN-[facility]  [address] [address]  [facility] [address]  Dept: [phone]  Dept Fax: [phone]     Patient ID: Mal Shaw is a 65 y.o. female Established patient    HPI    Pt here today for f/u on chronic medical problems, go over labs and/or diagnostic studies, and medication refills. Pt denies any fever or chills. Pt today denies any HA, chest pain, or SOB. Pt denies any N/V/D/C or abdominal pain. - overall she is feeling somewhat better, she states her BP has been controlled. Otherwise pt doing well on current tx and no other concerns today. The patient's past medical, surgical, social, and family history as well as his current medications and allergies were reviewed as documented in today's encounter DEDE Mccain. Previous office notes, labs, imaging and hospital records were reviewed prior to and during encounter. Current Outpatient Medications on File Prior to Visit   Medication Sig Dispense Refill    lisinopril-hydroCHLOROthiazide (PRINZIDE;ZESTORETIC) 10-12.5 MG per tablet TAKE 1 TABLET BY MOUTH EVERY DAY 90 tablet 1    ELIQUIS 5 MG TABS tablet Take 1 tablet by mouth 2 times daily 180 tablet 1    loratadine (CLARITIN) 10 MG tablet TAKE 1 TABLET BY MOUTH EVERY DAY 90 tablet 1    potassium chloride (KLOR-CON M) 10 MEQ extended release tablet Take 1 tablet by mouth daily 30 tablet 5    amLODIPine (NORVASC) 10 MG tablet Take 1 tablet by mouth daily 90 tablet 1    ondansetron (ZOFRAN) 4 MG tablet Take 1 tablet by mouth every 8 hours as needed for Nausea or Vomiting 20 tablet 0    fluticasone (FLONASE) 50 MCG/ACT nasal spray 1 spray by Each Nostril route daily 32 g 1    aspirin 81 MG EC tablet Take 81 mg by mouth daily      citalopram (CELEXA) 10 MG tablet TAKE 1 TABLET BY MOUTH EVERY DAY       No current facility-administered medications on file prior to visit.         Subjective:     Review of Systems Constitutional:  Negative for activity change, fatigue and fever. HENT:  Negative for congestion, ear pain, rhinorrhea and sore throat. Respiratory:  Negative for cough, chest tightness and shortness of breath. Cardiovascular:  Negative for chest pain and palpitations. Gastrointestinal:  Positive for abdominal pain, diarrhea and nausea. Negative for abdominal distention and constipation. Endocrine: Negative for polydipsia, polyphagia and polyuria. Genitourinary:  Negative for difficulty urinating and dysuria. Musculoskeletal:  Negative for arthralgias, back pain and myalgias. Skin:  Negative for color change and rash. Neurological:  Negative for dizziness, weakness, light-headedness and headaches. Hematological:  Negative for adenopathy. Psychiatric/Behavioral:  Negative for agitation and behavioral problems. The patient is not nervous/anxious. Vitals:    01/04/23 1517   BP: 126/82   Pulse: (!) 108   Resp: 16   Temp: 97 °F (36.1 °C)   SpO2: 98%       Objective:     Physical Exam  Vitals reviewed. Constitutional:       General: She is not in acute distress. Appearance: Normal appearance. HENT:      Head: Normocephalic and atraumatic. Right Ear: External ear normal.      Left Ear: External ear normal.      Nose: Nose normal.      Mouth/Throat:      Mouth: Mucous membranes are moist.      Pharynx: No oropharyngeal exudate or posterior oropharyngeal erythema. Eyes:      Extraocular Movements: Extraocular movements intact. Conjunctiva/sclera: Conjunctivae normal.      Pupils: Pupils are equal, round, and reactive to light. Cardiovascular:      Rate and Rhythm: Normal rate and regular rhythm. Pulses: Normal pulses. Heart sounds: Normal heart sounds. No murmur heard. Pulmonary:      Effort: Pulmonary effort is normal. No respiratory distress. Breath sounds: Normal breath sounds. No wheezing or rales.    Abdominal:      General: Bowel sounds are normal. There is no distension. Palpations: Abdomen is soft. Tenderness: There is no abdominal tenderness. Musculoskeletal:         General: Normal range of motion. Cervical back: Normal range of motion. Right lower leg: No edema. Left lower leg: No edema. Lymphadenopathy:      Cervical: No cervical adenopathy. Skin:     General: Skin is warm and dry. Neurological:      General: No focal deficit present. Mental Status: She is alert and oriented to person, place, and time. Deep Tendon Reflexes: Reflexes normal.   Psychiatric:         Mood and Affect: Mood normal. Mood is not anxious or depressed. Affect is not tearful. Behavior: Behavior normal. Behavior is cooperative. Assessment:      Diagnosis Orders   1. Hypomagnesemia  NM PARATHYORID W SPECT    Magnesium 500 MG CAPS    Magnesium      2. Vitamin D deficiency  NM PARATHYORID W SPECT    vitamin D (ERGOCALCIFEROL) 1.25 MG (48778 UT) CAPS capsule    Vitamin D 25 Hydroxy      3. Elevated parathyroid hormone  DEXA BONE DENSITY 2 SITES    NM PARATHYORID W SPECT    PTH, Intact with Ionized Calcium    Basic Metabolic Panel    TSH with Reflex      4. Hyperhomocysteinemia (HCC)  TSH with Reflex      5. Antithrombin III deficiency ([address]        6. B12 deficiency  Cobalamin Combinations (B12 FOLATE) 800-800 MCG CAPS    Vitamin B12 & Folate      7. Hypokalemia  Basic Metabolic Panel      8. Mixed hyperlipidemia  Lipid Panel      9. Hypocalcemia          Plan:     Bilateral pulmonary embolism (HCC)  Acute deep vein thrombosis (DVT) of left peroneal vein (HCC)  Antithrombin III deficiency (HCC)  - Continue Eliquis 5 mg BID. - continue to follow up with hematology as scheduled. - Patient instructed to monitor for s/s of bleeding while taking Eliquis. Hypocalcemia  Hyperhomocysteinemia (HCC)  B12 deficiency  Hypomagnesemia  Vitamin D deficiency  Elevated parathyroid hormone  - labs reviewed and discussed with pt in depth.   - start magnesium, Vit D, and B12/folate supplements as prescribed. - will get dexa bone scan and parathyroid w/spect to further evaluate to rule out primary vs secondary parathyroidism.   - discussed endocrinology if needed depending on testing results. - pt verbalized understanding and receptive to plan of care. - redraw labs in 3 months. Primary hypertension  - Stable: Medication re-filled as needed, con't medications as prescribed, con't current tx plan  - Continue lisinopril/ HCTZ as previously prescribed  - continue amlodipine 10 mg daily   - Education provided on maintaining a low sodium diet and routine exercise. - Advised to seek emergent tx if SBP>180 and/or DBP>110, any chest pain, h/a or vision changes    Hypokalemia  - continue potassium 10 meq daily. - increase foods high in potassium in diet such as the following: beans, dark leafy greens, potatoes, squash, yogurt, fish, avocados, mushrooms, and bananas. Mixed hyperlipidemia  - Stable  - Lifestyle changes: Decrease fats, sugars, carbohydrates, and increase routine exercise, try to get 150 minutes of aerobic activity a week and watch calorie portions and to limit her carbs in her diet. - Foods that helps increase HDL include the following: Fish, nuts, flax, avocados, and legumes (such as soy, kidney beans, chickpeas). - Will cont with low fat/chol diet     Return in about 3 months (around 4/4/2023) for 3 month follow up with labs. - call or return to office, if develops any symptoms. - pt verbalized understanding plan of care. Medications, labs, diagnostic studies, consultations and follow-up as documented in this encounter.  Rest of systems unchanged, continue current treatments    On this date 1/4/2022 I have spent 75 minutes reviewing previous notes, test results and face to face with the patient discussing the diagnosis and importance of compliance with the treatment plan as well as documenting on the day of the visit.    Татьяна Ennis, APRN-CNP

## 2023-01-05 DIAGNOSIS — E53.8 B12 DEFICIENCY: ICD-10-CM

## 2023-01-06 LAB — VITAMIN B6: 11.3 NMOL/L (ref 20–125)

## 2023-01-08 DIAGNOSIS — E53.1 VITAMIN B6 DEFICIENCY: Primary | ICD-10-CM

## 2023-01-08 RX ORDER — PYRIDOXINE HCL (VITAMIN B6) 50 MG
50 TABLET ORAL DAILY
Qty: 30 TABLET | Refills: 3 | Status: SHIPPED | OUTPATIENT
Start: 2023-01-08

## 2023-01-19 ENCOUNTER — INITIAL CONSULT (OUTPATIENT)
Dept: ONCOLOGY | Age: 63
End: 2023-01-19
Payer: COMMERCIAL

## 2023-01-19 ENCOUNTER — TELEPHONE (OUTPATIENT)
Dept: ONCOLOGY | Age: 63
End: 2023-01-19

## 2023-01-19 VITALS
HEART RATE: 105 BPM | SYSTOLIC BLOOD PRESSURE: 104 MMHG | DIASTOLIC BLOOD PRESSURE: 63 MMHG | TEMPERATURE: 97 F | BODY MASS INDEX: 46.14 KG/M2 | WEIGHT: 268.8 LBS

## 2023-01-19 DIAGNOSIS — I82.90 DEEP VEIN THROMBOSIS (DVT) OF NON-EXTREMITY VEIN, UNSPECIFIED CHRONICITY: Primary | ICD-10-CM

## 2023-01-19 PROCEDURE — G8482 FLU IMMUNIZE ORDER/ADMIN: HCPCS | Performed by: INTERNAL MEDICINE

## 2023-01-19 PROCEDURE — 3078F DIAST BP <80 MM HG: CPT | Performed by: INTERNAL MEDICINE

## 2023-01-19 PROCEDURE — G8417 CALC BMI ABV UP PARAM F/U: HCPCS | Performed by: INTERNAL MEDICINE

## 2023-01-19 PROCEDURE — 99204 OFFICE O/P NEW MOD 45 MIN: CPT | Performed by: INTERNAL MEDICINE

## 2023-01-19 PROCEDURE — G8427 DOCREV CUR MEDS BY ELIG CLIN: HCPCS | Performed by: INTERNAL MEDICINE

## 2023-01-19 PROCEDURE — 3074F SYST BP LT 130 MM HG: CPT | Performed by: INTERNAL MEDICINE

## 2023-01-19 NOTE — TELEPHONE ENCOUNTER
AVS from 1/19/23      Rv in 3 months.     *rv is sched for 4/13/23@345pm w/labs 1 week prior    PT was given AVS and appt schedule

## 2023-01-23 NOTE — PROGRESS NOTES
DIAGNOSIS:   History of pulmonary emboli in   Low Antithrombin III levels  Elevated homocysteine likely secondary to B12 deficiency. CURRENT THERAPY:  Anticoagulation with Eliquis  Oral B12 supplements  BRIEF CASE HISTORY:   Andre Salinas is a very pleasant 65 y.o. female who is referred to us for history of pulmonary emboli. That was diagnosed in . She was started on Eliquis. Follow-up study showed resolution of pulmonary emboli and no DVT. Work up showed increased homocystine and vitamin B12 deficiency. She was also found to have low Antithrombin III level. She is sent to us for a consultation, she is on B12 supplements. But orally. No fever or chills or night sweats, no problems with anticoagulation. PAST MEDICAL HISTORY: has a past medical history of Anemia, DVT (deep venous thrombosis) ([address] History of depression, History of spontaneous , Hyperlipidemia, Hypertension, Morbid obesity ([address] and Vision abnormalities. PAST SURGICAL HISTORY: has a past surgical history that includes Cholecystectomy (); hernia repair (Right, ); and Colonoscopy. CURRENT MEDICATIONS:  has a current medication list which includes the following prescription(s): pyridoxine, magnesium, vitamin d, b12 folate, lisinopril-hydrochlorothiazide, eliquis, loratadine, potassium chloride, amlodipine, ondansetron, fluticasone, aspirin, and citalopram.    ALLERGIES:  is allergic to codeine. FAMILY HISTORY: Negative for any hematological or oncological conditions. SOCIAL HISTORY:  reports that she has never smoked. She has never used smokeless tobacco. She reports current alcohol use. She reports that she does not use drugs. REVIEW OF SYSTEMS:   General: no fever or night sweats, Weight is stable.   ENT: No double or blurred vision, no tinnitus or hearing problem, no dysphagia or sore throat   Respiratory: No chest pain, no shortness of breath, no cough or hemoptysis. Cardiovascular: Denies chest pain, PND or orthopnea. No L E swelling or palpitations. Gastrointestinal:    No nausea or vomiting, abdominal pain, diarrhea or constipation. Genitourinary: Denies dysuria, hematuria, frequency, urgency or incontinence. Neurological: Denies headaches, decreased LOC, no sensory or motor focal deficits. Musculoskeletal:  No arthralgia no back pain or joint swelling. Skin: There are no rashes or bleeding. Psychiatric:  No anxiety, no depression. Endocrine: no diabetes or thyroid disease. Hematologic: no bleeding , no adenopathy. PHYSICAL EXAM: Shows a well appearing 65y.o.-year-old female who is not in pain or distress. Vital Signs: Blood pressure 104/63, pulse (!) 105, temperature 97 °F (36.1 °C), temperature source Temporal, weight 268 lb 12.8 oz (121.9 kg), last menstrual period 10/01/2013, not currently breastfeeding. HEENT: Normocephalic and atraumatic. Pupils are equal, round, reactive to light and accommodation. Extraocular muscles are intact. Neck: Showed no JVD, no carotid bruit . Lungs: Clear to auscultation bilaterally. Heart: Regular without any murmur. Abdomen: Soft, nontender. No hepatosplenomegaly. Extremities: Lower extremities show no edema, clubbing, or cyanosis. Breasts: Examination not done today.  Neuro exam: intact cranial nerves bilaterally no motor or sensory deficit, gait is normal. Lymphatic: no adenopathy appreciated in the supraclavicular, axillary, cervical or inguinal area    REVIEW OF LABORATORY DATA:    Latest Reference Range & Units 11/30/22 07:54   Anticardiolipin IgA 0.0 - 14.0 APL 2.8   AT-III Activity 83 - 122 % 75 (L)   PROTHROMBIN MUTATION  Negative   dRVVT NEGATIVE for Lupus Anticoagulant  NEGATIVE for Lupus Anticoagulant   PROTEIN C ACTIVITY,PCACG >80 % 92   Protein C Antigen 63 - 153 % 87   PROTEIN S ACTIVITY 59 - 130 % 95   Protein S Ag, Total 63 - 126 % 125   (L): Data is abnormally low   Latest Reference Range & Units 1/3/23 07:47   FOLATE, FOLAT >4.8 ng/mL 4.2 (L)   Vitamin B-12 232 - 1245 pg/mL 179 (L)   (L): Data is abnormally low  REVIEW OF RADIOLOGICAL RESULTS:     IMPRESSION:   Vitamin B12 deficient  Elevated homocysteine secondary to B12 deficiency  Low Antithrombin III, but testing happened on the c occasion of the acute thrombosis  PLAN:   I had a very long discussion with the patient.   Not sure her B12 deficiency is correctable with oral B12 supplementation  We will check her levels and if its not enough, we will recommend parenteral B12 replacement  Not sure if the Antithrombin deficiency is real.  We will repeat the testing in the future  We will see her back in 3 months for a follow-up

## 2023-01-31 ENCOUNTER — TELEPHONE (OUTPATIENT)
Dept: FAMILY MEDICINE CLINIC | Age: 63
End: 2023-01-31

## 2023-01-31 DIAGNOSIS — E72.11 HYPERHOMOCYSTEINEMIA (HCC): ICD-10-CM

## 2023-01-31 DIAGNOSIS — E34.9 ELEVATED PARATHYROID HORMONE: Primary | ICD-10-CM

## 2023-01-31 NOTE — TELEPHONE ENCOUNTER
Patient was advised she must have a CTA neck scan ordered with her NM Parathyroid test or she has to have it done at another location. Needs signed off on asap or will be rescheduled.

## 2023-02-02 ENCOUNTER — HOSPITAL ENCOUNTER (OUTPATIENT)
Dept: MAMMOGRAPHY | Age: 63
Discharge: HOME | End: 2023-02-04
Payer: COMMERCIAL

## 2023-02-02 DIAGNOSIS — E34.9 ELEVATED PARATHYROID HORMONE: ICD-10-CM

## 2023-02-02 PROCEDURE — 77080 DXA BONE DENSITY AXIAL: CPT

## 2023-02-03 ENCOUNTER — HOSPITAL ENCOUNTER (OUTPATIENT)
Dept: NUCLEAR MEDICINE | Age: 63
Discharge: HOME | End: 2023-02-05
Payer: COMMERCIAL

## 2023-02-03 ENCOUNTER — HOSPITAL ENCOUNTER (OUTPATIENT)
Dept: CT IMAGING | Age: 63
Discharge: HOME | End: 2023-02-03
Payer: COMMERCIAL

## 2023-02-03 DIAGNOSIS — E34.9 ELEVATED PARATHYROID HORMONE: ICD-10-CM

## 2023-02-03 DIAGNOSIS — E55.9 VITAMIN D DEFICIENCY: ICD-10-CM

## 2023-02-03 DIAGNOSIS — E83.42 HYPOMAGNESEMIA: ICD-10-CM

## 2023-02-03 DIAGNOSIS — E72.11 HYPERHOMOCYSTEINEMIA (HCC): ICD-10-CM

## 2023-02-03 LAB
CREAT SERPL-MCNC: 0.93 MG/DL (ref 0.5–0.9)
GFR SERPL CREATININE-BSD FRML MDRD: >60 ML/MIN/1.73M2

## 2023-02-03 PROCEDURE — 6360000004 HC RX CONTRAST MEDICATION: Performed by: NURSE PRACTITIONER

## 2023-02-03 PROCEDURE — 78071 PARATHYRD PLANAR W/WO SUBTRJ: CPT

## 2023-02-03 PROCEDURE — 2580000003 HC RX 258: Performed by: NURSE PRACTITIONER

## 2023-02-03 PROCEDURE — 70498 CT ANGIOGRAPHY NECK: CPT

## 2023-02-03 PROCEDURE — A9500 TC99M SESTAMIBI: HCPCS | Performed by: NURSE PRACTITIONER

## 2023-02-03 PROCEDURE — 82565 ASSAY OF CREATININE: CPT

## 2023-02-03 PROCEDURE — 3430000000 HC RX DIAGNOSTIC RADIOPHARMACEUTICAL: Performed by: NURSE PRACTITIONER

## 2023-02-03 RX ORDER — 0.9 % SODIUM CHLORIDE 0.9 %
80 INTRAVENOUS SOLUTION INTRAVENOUS ONCE
Status: COMPLETED | OUTPATIENT
Start: 2023-02-03 | End: 2023-02-03

## 2023-02-03 RX ORDER — SODIUM CHLORIDE 0.9 % (FLUSH) 0.9 %
10 SYRINGE (ML) INJECTION ONCE
Status: COMPLETED | OUTPATIENT
Start: 2023-02-03 | End: 2023-02-03

## 2023-02-03 RX ORDER — TECHNETIUM TC-99M SESTAMIBI 1 MG/10ML
20 INJECTION INTRAVENOUS
Status: DISCONTINUED | OUTPATIENT
Start: 2023-02-03 | End: 2023-02-06 | Stop reason: HOSPADM

## 2023-02-03 RX ORDER — SODIUM CHLORIDE 0.9 % (FLUSH) 0.9 %
10 SYRINGE (ML) INJECTION PRN
Status: DISCONTINUED | OUTPATIENT
Start: 2023-02-03 | End: 2023-02-06 | Stop reason: HOSPADM

## 2023-02-03 RX ADMIN — IOPAMIDOL 100 ML: 755 INJECTION, SOLUTION INTRAVENOUS at 08:42

## 2023-02-03 RX ADMIN — SODIUM CHLORIDE, PRESERVATIVE FREE 10 ML: 5 INJECTION INTRAVENOUS at 08:43

## 2023-02-03 RX ADMIN — TETRAKIS(2-METHOXYISOBUTYLISOCYANIDE)COPPER(I) TETRAFLUOROBORATE 22.4 MILLICURIE: 1 INJECTION, POWDER, LYOPHILIZED, FOR SOLUTION INTRAVENOUS at 07:12

## 2023-02-03 RX ADMIN — SODIUM CHLORIDE, PRESERVATIVE FREE 10 ML: 5 INJECTION INTRAVENOUS at 07:05

## 2023-02-03 RX ADMIN — SODIUM CHLORIDE 80 ML: 9 INJECTION, SOLUTION INTRAVENOUS at 08:43

## 2023-03-20 DIAGNOSIS — E55.9 VITAMIN D DEFICIENCY: ICD-10-CM

## 2023-03-20 DIAGNOSIS — E83.42 HYPOMAGNESEMIA: ICD-10-CM

## 2023-03-20 RX ORDER — ERGOCALCIFEROL 1.25 MG/1
50000 CAPSULE ORAL WEEKLY
Qty: 4 CAPSULE | Refills: 3 | Status: SHIPPED | OUTPATIENT
Start: 2023-03-20

## 2023-03-20 RX ORDER — FOLIC ACID 0.8 MG
1 TABLET ORAL DAILY
Qty: 30 CAPSULE | Refills: 2 | Status: SHIPPED | OUTPATIENT
Start: 2023-03-20

## 2023-03-23 RX ORDER — CITALOPRAM 10 MG/1
10 TABLET ORAL DAILY
Qty: 90 TABLET | Refills: 1 | Status: SHIPPED | OUTPATIENT
Start: 2023-03-23

## 2023-03-23 NOTE — TELEPHONE ENCOUNTER
----- Message from Frances Graf sent at 3/23/2023  8:14 AM EDT -----  Subject: Refill Request    QUESTIONS  Name of Medication? citalopram (CELEXA) 10 MG tablet  Patient-reported dosage and instructions? 10mg once daily  How many days do you have left? 2  Preferred Pharmacy? [address]  Pharmacy phone number (if available)? [phone]  ---------------------------------------------------------------------------  --------------  CALL BACK INFO  What is the best way for the office to contact you? OK to leave message on   voicemail  Preferred Call Back Phone Number? [phone]  ---------------------------------------------------------------------------  --------------  SCRIPT ANSWERS  Relationship to Patient?  Self

## 2023-03-25 DIAGNOSIS — E53.1 VITAMIN B6 DEFICIENCY: ICD-10-CM

## 2023-03-25 DIAGNOSIS — I10 PRIMARY HYPERTENSION: ICD-10-CM

## 2023-03-25 DIAGNOSIS — E87.6 HYPOKALEMIA: ICD-10-CM

## 2023-03-27 RX ORDER — LISINOPRIL AND HYDROCHLOROTHIAZIDE 12.5; 1 MG/1; MG/1
1 TABLET ORAL DAILY
Qty: 90 TABLET | Refills: 1 | Status: SHIPPED | OUTPATIENT
Start: 2023-03-27

## 2023-03-27 RX ORDER — AMLODIPINE BESYLATE 10 MG/1
10 TABLET ORAL DAILY
Qty: 90 TABLET | Refills: 1 | Status: SHIPPED | OUTPATIENT
Start: 2023-03-27

## 2023-03-27 RX ORDER — PYRIDOXINE HCL (VITAMIN B6) 50 MG
50 TABLET ORAL DAILY
Qty: 30 TABLET | Refills: 3 | Status: SHIPPED | OUTPATIENT
Start: 2023-03-27

## 2023-03-27 RX ORDER — POTASSIUM CHLORIDE 750 MG/1
10 TABLET, EXTENDED RELEASE ORAL DAILY
Qty: 30 TABLET | Refills: 5 | Status: SHIPPED | OUTPATIENT
Start: 2023-03-27

## 2023-03-28 ENCOUNTER — HOSPITAL ENCOUNTER (OUTPATIENT)
Age: 63
Setting detail: SPECIMEN
Discharge: HOME | End: 2023-03-28

## 2023-03-28 DIAGNOSIS — E87.6 HYPOKALEMIA: ICD-10-CM

## 2023-03-28 DIAGNOSIS — E53.1 VITAMIN B6 DEFICIENCY: ICD-10-CM

## 2023-03-28 DIAGNOSIS — E34.9 ELEVATED PARATHYROID HORMONE: ICD-10-CM

## 2023-03-28 DIAGNOSIS — E83.42 HYPOMAGNESEMIA: ICD-10-CM

## 2023-03-28 DIAGNOSIS — E72.11 HYPERHOMOCYSTEINEMIA (HCC): ICD-10-CM

## 2023-03-28 DIAGNOSIS — E55.9 VITAMIN D DEFICIENCY: ICD-10-CM

## 2023-03-28 DIAGNOSIS — E53.8 B12 DEFICIENCY: ICD-10-CM

## 2023-03-28 DIAGNOSIS — E78.2 MIXED HYPERLIPIDEMIA: ICD-10-CM

## 2023-03-28 LAB
25(OH)D3 SERPL-MCNC: 29.1 NG/ML
ANION GAP SERPL CALCULATED.3IONS-SCNC: 20 MMOL/L (ref 9–17)
BUN SERPL-MCNC: 16 MG/DL (ref 8–23)
CA-I BLD-SCNC: 1.17 MMOL/L (ref 1.13–1.33)
CALCIUM SERPL-MCNC: 9.4 MG/DL (ref 8.6–10.4)
CHLORIDE SERPL-SCNC: 97 MMOL/L (ref 98–107)
CHOLEST SERPL-MCNC: 181 MG/DL
CHOLESTEROL/HDL RATIO: 4
CO2 SERPL-SCNC: 19 MMOL/L (ref 20–31)
CREAT SERPL-MCNC: 1.2 MG/DL (ref 0.5–0.9)
GFR SERPL CREATININE-BSD FRML MDRD: 52 ML/MIN/1.73M2
GLUCOSE SERPL-MCNC: 88 MG/DL (ref 70–99)
HDLC SERPL-MCNC: 45 MG/DL
HOMOCYSTEINE: 59 UMOL/L
LDLC SERPL CALC-MCNC: 107 MG/DL (ref 0–130)
MAGNESIUM SERPL-MCNC: 1.7 MG/DL (ref 1.6–2.6)
POTASSIUM SERPL-SCNC: 4.7 MMOL/L (ref 3.7–5.3)
PTH-INTACT SERPL-MCNC: 98.3 PG/ML (ref 14–72)
SODIUM SERPL-SCNC: 136 MMOL/L (ref 135–144)
TRIGL SERPL-MCNC: 144 MG/DL
TSH SERPL-ACNC: 4.24 UIU/ML (ref 0.3–5)

## 2023-03-29 LAB
FOLATE SERPL-MCNC: 2.2 NG/ML
VIT B12 SERPL-MCNC: 466 PG/ML (ref 232–1245)

## 2023-03-30 ENCOUNTER — OFFICE VISIT (OUTPATIENT)
Dept: FAMILY MEDICINE CLINIC | Age: 63
End: 2023-03-30

## 2023-03-30 VITALS
OXYGEN SATURATION: 96 % | RESPIRATION RATE: 16 BRPM | DIASTOLIC BLOOD PRESSURE: 84 MMHG | BODY MASS INDEX: 45.66 KG/M2 | HEART RATE: 103 BPM | WEIGHT: 266 LBS | TEMPERATURE: 98 F | SYSTOLIC BLOOD PRESSURE: 128 MMHG

## 2023-03-30 DIAGNOSIS — E53.8 B12 DEFICIENCY: ICD-10-CM

## 2023-03-30 DIAGNOSIS — E78.2 MIXED HYPERLIPIDEMIA: ICD-10-CM

## 2023-03-30 DIAGNOSIS — E34.9 ELEVATED PARATHYROID HORMONE: ICD-10-CM

## 2023-03-30 DIAGNOSIS — D68.59 ANTITHROMBIN III DEFICIENCY (HCC): ICD-10-CM

## 2023-03-30 DIAGNOSIS — E83.42 HYPOMAGNESEMIA: ICD-10-CM

## 2023-03-30 DIAGNOSIS — I26.99 BILATERAL PULMONARY EMBOLISM (HCC): Primary | ICD-10-CM

## 2023-03-30 DIAGNOSIS — E83.51 HYPOCALCEMIA: ICD-10-CM

## 2023-03-30 DIAGNOSIS — I82.452 ACUTE DEEP VEIN THROMBOSIS (DVT) OF LEFT PERONEAL VEIN (HCC): ICD-10-CM

## 2023-03-30 DIAGNOSIS — I10 PRIMARY HYPERTENSION: ICD-10-CM

## 2023-03-30 DIAGNOSIS — E87.6 HYPOKALEMIA: ICD-10-CM

## 2023-03-30 DIAGNOSIS — E55.9 VITAMIN D DEFICIENCY: ICD-10-CM

## 2023-03-30 DIAGNOSIS — E72.11 HYPERHOMOCYSTEINEMIA (HCC): ICD-10-CM

## 2023-03-30 RX ORDER — BACLOFEN 20 MG
1 TABLET ORAL DAILY
COMMUNITY
Start: 2023-03-22

## 2023-03-30 SDOH — ECONOMIC STABILITY: HOUSING INSECURITY
IN THE LAST 12 MONTHS, WAS THERE A TIME WHEN YOU DID NOT HAVE A STEADY PLACE TO SLEEP OR SLEPT IN A SHELTER (INCLUDING NOW)?: NO

## 2023-03-30 SDOH — ECONOMIC STABILITY: FOOD INSECURITY: WITHIN THE PAST 12 MONTHS, YOU WORRIED THAT YOUR FOOD WOULD RUN OUT BEFORE YOU GOT MONEY TO BUY MORE.: NEVER TRUE

## 2023-03-30 SDOH — ECONOMIC STABILITY: INCOME INSECURITY: HOW HARD IS IT FOR YOU TO PAY FOR THE VERY BASICS LIKE FOOD, HOUSING, MEDICAL CARE, AND HEATING?: NOT HARD AT ALL

## 2023-03-30 SDOH — ECONOMIC STABILITY: TRANSPORTATION INSECURITY
IN THE PAST 12 MONTHS, HAS LACK OF TRANSPORTATION KEPT YOU FROM MEETINGS, WORK, OR FROM GETTING THINGS NEEDED FOR DAILY LIVING?: NO

## 2023-03-30 SDOH — ECONOMIC STABILITY: FOOD INSECURITY: WITHIN THE PAST 12 MONTHS, THE FOOD YOU BOUGHT JUST DIDN'T LAST AND YOU DIDN'T HAVE MONEY TO GET MORE.: NEVER TRUE

## 2023-03-30 ASSESSMENT — ENCOUNTER SYMPTOMS
DIARRHEA: 0
ABDOMINAL DISTENTION: 0
CONSTIPATION: 0
NAUSEA: 0
RHINORRHEA: 0
ABDOMINAL PAIN: 0
COLOR CHANGE: 0
SORE THROAT: 0
COUGH: 0
BACK PAIN: 0
CHEST TIGHTNESS: 0
SHORTNESS OF BREATH: 0

## 2023-03-30 NOTE — PROGRESS NOTES
deficiency ([facility] [address]  - Continue Eliquis 5 mg BID. - continue to follow up with hematology as scheduled. - Patient instructed to monitor for s/s of bleeding while taking Eliquis. Hypocalcemia  Hyperhomocysteinemia (HCC)  B12 deficiency  Hypomagnesemia  Vitamin D deficiency  Elevated parathyroid hormone  - labs reviewed and discussed with pt in depth. - Continue magnesium, Vit D, and B12/folate supplements as prescribed. - no evidence of parathyroid adenoma. - discussed referral to endocrinology for further evaluation due to PTH continuing to increase and pt is not interested, she is feeling better and would like to give current treatment more time and see if labs continue to improve. - pt verbalized understanding and receptive to plan of care. - redraw labs in 3 months. Primary hypertension  - Stable: Medication re-filled as needed, con't medications as prescribed, con't current tx plan  - Continue lisinopril/ HCTZ as previously prescribed  - continue amlodipine 10 mg daily   - Education provided on maintaining a low sodium diet and routine exercise. - Advised to seek emergent tx if SBP>180 and/or DBP>110, any chest pain, h/a or vision changes    Hypokalemia  - K 4.7  - continue potassium 10 meq daily. - increase foods high in potassium in diet such as the following: beans, dark leafy greens, potatoes, squash, yogurt, fish, avocados, mushrooms, and bananas. Mixed hyperlipidemia  - Stable  - Lifestyle changes: Decrease fats, sugars, carbohydrates, and increase routine exercise, try to get 150 minutes of aerobic activity a week and watch calorie portions and to limit her carbs in her diet. - Foods that helps increase HDL include the following: Fish, nuts, flax, avocados, and legumes (such as soy, kidney beans, chickpeas). - Will cont with low fat/chol diet     Return in about 6 months (around 9/30/2023) for 6 months with labs. - call or return to office, if develops any symptoms.      - pt

## 2023-04-01 LAB — VITAMIN B6: 163.1 NMOL/L (ref 20–125)

## 2023-04-02 DIAGNOSIS — E53.1 VITAMIN B6 DEFICIENCY: Primary | ICD-10-CM

## 2023-05-08 DIAGNOSIS — E87.6 HYPOKALEMIA: ICD-10-CM

## 2023-05-08 RX ORDER — POTASSIUM CHLORIDE 750 MG/1
10 TABLET, EXTENDED RELEASE ORAL DAILY
Qty: 30 TABLET | Refills: 5 | Status: SHIPPED | OUTPATIENT
Start: 2023-05-08

## 2023-06-06 DIAGNOSIS — I10 PRIMARY HYPERTENSION: ICD-10-CM

## 2023-06-06 RX ORDER — LISINOPRIL AND HYDROCHLOROTHIAZIDE 12.5; 1 MG/1; MG/1
1 TABLET ORAL DAILY
Qty: 90 TABLET | Refills: 1 | Status: SHIPPED | OUTPATIENT
Start: 2023-06-06

## 2023-06-06 RX ORDER — CITALOPRAM 10 MG/1
10 TABLET ORAL DAILY
Qty: 90 TABLET | Refills: 1 | Status: SHIPPED | OUTPATIENT
Start: 2023-06-06

## 2023-06-19 DIAGNOSIS — R17 ELEVATED BILIRUBIN: Primary | ICD-10-CM

## 2023-07-03 RX ORDER — BACLOFEN 20 MG
TABLET ORAL
Qty: 30 TABLET | Refills: 0 | Status: SHIPPED | OUTPATIENT
Start: 2023-07-03

## 2023-07-03 RX ORDER — BACLOFEN 20 MG
1 TABLET ORAL DAILY
Qty: 30 TABLET | Refills: 0 | OUTPATIENT
Start: 2023-07-03

## 2023-07-05 RX ORDER — BACLOFEN 20 MG
TABLET ORAL
Qty: 30 TABLET | Refills: 0 | OUTPATIENT
Start: 2023-07-05

## 2023-07-22 DIAGNOSIS — E55.9 VITAMIN D DEFICIENCY: ICD-10-CM

## 2023-07-24 RX ORDER — ERGOCALCIFEROL 1.25 MG/1
CAPSULE ORAL
Qty: 4 CAPSULE | Refills: 0 | OUTPATIENT
Start: 2023-07-24

## 2023-07-29 DIAGNOSIS — E55.9 VITAMIN D DEFICIENCY: ICD-10-CM

## 2023-07-31 RX ORDER — ERGOCALCIFEROL 1.25 MG/1
CAPSULE ORAL
Qty: 4 CAPSULE | Refills: 0 | OUTPATIENT
Start: 2023-07-31

## 2023-08-02 DIAGNOSIS — E55.9 VITAMIN D DEFICIENCY: ICD-10-CM

## 2023-08-02 RX ORDER — ERGOCALCIFEROL 1.25 MG/1
50000 CAPSULE ORAL WEEKLY
Qty: 4 CAPSULE | Refills: 3 | Status: CANCELLED | OUTPATIENT
Start: 2023-08-02

## 2023-08-02 RX ORDER — CITALOPRAM HYDROBROMIDE 10 MG/1
10 TABLET ORAL DAILY
Qty: 90 TABLET | Refills: 1 | Status: SHIPPED | OUTPATIENT
Start: 2023-08-02

## 2023-08-02 RX ORDER — BACLOFEN 20 MG
1 TABLET ORAL DAILY
Qty: 90 TABLET | Refills: 1 | Status: SHIPPED | OUTPATIENT
Start: 2023-08-02

## 2023-08-03 DIAGNOSIS — E55.9 VITAMIN D DEFICIENCY: ICD-10-CM

## 2023-08-03 RX ORDER — ERGOCALCIFEROL 1.25 MG/1
CAPSULE ORAL
Qty: 4 CAPSULE | Refills: 0 | OUTPATIENT
Start: 2023-08-03

## 2023-08-07 DIAGNOSIS — I82.452 ACUTE DEEP VEIN THROMBOSIS (DVT) OF LEFT PERONEAL VEIN (HCC): ICD-10-CM

## 2023-08-07 DIAGNOSIS — I26.99 BILATERAL PULMONARY EMBOLISM (HCC): ICD-10-CM

## 2023-08-22 NOTE — CARE COORDINATION
[facility] ICU Quality Flow/Interdisciplinary Rounds Progress Note    Quality Flow Rounds held on April 12, 2022 at [address] Attending:  Bedside Nurse, , Nursing Unit Leadership, Dietary, Respiratory Therapy, Physical Therapy, Occupational Therapy and Physician    Anticipated Discharge Date:  Expected Discharge Date: 04/12/22    Anticipated Discharge Disposition: home with spouse    Readmission Risk              Risk of Unplanned Readmission:  0           Discussed patient goal for the day, patient clinical progression, and barriers to discharge.   The following Goal(s) of the Day/Commitment(s) have been identified:  Diet Progression    and d/c today      Charli Walter RN  April 12, 2022 [facility]

## 2023-09-05 DIAGNOSIS — I10 PRIMARY HYPERTENSION: ICD-10-CM

## 2023-09-05 RX ORDER — AMLODIPINE BESYLATE 10 MG/1
10 TABLET ORAL DAILY
Qty: 90 TABLET | Refills: 1 | Status: SHIPPED | OUTPATIENT
Start: 2023-09-05

## 2023-09-19 RX ORDER — CITALOPRAM HYDROBROMIDE 10 MG/1
10 TABLET ORAL DAILY
Qty: 90 TABLET | Refills: 1 | Status: SHIPPED | OUTPATIENT
Start: 2023-09-19

## 2023-09-19 NOTE — TELEPHONE ENCOUNTER
Please Approve or Refuse.   Send to Pharmacy per Pt's Request:      Next Visit Date:  10/2/2023   Last Visit Date: 3/30/2023    Hemoglobin A1C (%)   Date Value   11/30/2022 5.1   10/22/2013 5.9             ( goal A1C is < 7)   BP Readings from Last 3 Encounters:   03/30/23 128/84   01/19/23 104/63   01/04/23 126/82          (goal 120/80)  BUN   Date Value Ref Range Status   03/28/2023 16 8 - 23 mg/dL Final     Creatinine   Date Value Ref Range Status   03/28/2023 1.20 (H) 0.50 - 0.90 mg/dL Final     Potassium   Date Value Ref Range Status   03/28/2023 4.7 3.7 - 5.3 mmol/L Final

## 2023-09-28 DIAGNOSIS — E87.6 HYPOKALEMIA: ICD-10-CM

## 2023-09-28 DIAGNOSIS — I10 PRIMARY HYPERTENSION: ICD-10-CM

## 2023-09-28 RX ORDER — POTASSIUM CHLORIDE 750 MG/1
10 TABLET, EXTENDED RELEASE ORAL DAILY
Qty: 30 TABLET | Refills: 5 | Status: SHIPPED | OUTPATIENT
Start: 2023-09-28

## 2023-09-28 RX ORDER — LISINOPRIL AND HYDROCHLOROTHIAZIDE 12.5; 1 MG/1; MG/1
1 TABLET ORAL DAILY
Qty: 90 TABLET | Refills: 1 | Status: SHIPPED | OUTPATIENT
Start: 2023-09-28

## 2023-10-16 DIAGNOSIS — R11.0 NAUSEA: ICD-10-CM

## 2023-10-16 RX ORDER — ONDANSETRON 4 MG/1
4 TABLET, FILM COATED ORAL EVERY 8 HOURS PRN
Qty: 20 TABLET | Refills: 0 | Status: SHIPPED | OUTPATIENT
Start: 2023-10-16

## 2023-12-21 ENCOUNTER — APPOINTMENT (OUTPATIENT)
Dept: CT IMAGING | Age: 63
End: 2023-12-21
Payer: COMMERCIAL

## 2023-12-21 ENCOUNTER — HOSPITAL ENCOUNTER (OUTPATIENT)
Age: 63
Setting detail: OBSERVATION
Discharge: HOME | End: 2023-12-22
Attending: EMERGENCY MEDICINE | Admitting: HOSPITALIST
Payer: COMMERCIAL

## 2023-12-21 DIAGNOSIS — R42 DIZZINESS: Primary | ICD-10-CM

## 2023-12-21 DIAGNOSIS — E87.8 ELECTROLYTE AND FLUID DISORDER: ICD-10-CM

## 2023-12-21 PROBLEM — R74.01 TRANSAMINITIS: Status: ACTIVE | Noted: 2023-12-21

## 2023-12-21 PROBLEM — R11.0 NAUSEA: Status: ACTIVE | Noted: 2023-12-21

## 2023-12-21 PROBLEM — E83.42 HYPOMAGNESEMIA: Status: ACTIVE | Noted: 2023-12-21

## 2023-12-21 LAB
ALBUMIN SERPL-MCNC: 2.9 G/DL (ref 3.5–5.2)
ALBUMIN/GLOB SERPL: 0.8 {RATIO} (ref 1–2.5)
ALP SERPL-CCNC: 174 U/L (ref 35–104)
ALT SERPL-CCNC: 60 U/L (ref 5–33)
ANION GAP SERPL CALCULATED.3IONS-SCNC: 15 MMOL/L (ref 9–17)
AST SERPL-CCNC: 92 U/L
BASOPHILS # BLD: 0.1 K/UL (ref 0–0.2)
BASOPHILS NFR BLD: 1 % (ref 0–2)
BILIRUB SERPL-MCNC: 1.3 MG/DL (ref 0.3–1.2)
BUN SERPL-MCNC: 14 MG/DL (ref 8–23)
CALCIUM SERPL-MCNC: 8 MG/DL (ref 8.6–10.4)
CHLORIDE SERPL-SCNC: 91 MMOL/L (ref 98–107)
CO2 SERPL-SCNC: 26 MMOL/L (ref 20–31)
CREAT SERPL-MCNC: 1.2 MG/DL (ref 0.5–0.9)
EOSINOPHIL # BLD: 0.1 K/UL (ref 0–0.4)
EOSINOPHILS RELATIVE PERCENT: 1 % (ref 1–4)
ERYTHROCYTE [DISTWIDTH] IN BLOOD BY AUTOMATED COUNT: 13.6 % (ref 12.5–15.4)
GFR SERPL CREATININE-BSD FRML MDRD: 51 ML/MIN/1.73M2
GLUCOSE SERPL-MCNC: 125 MG/DL (ref 70–99)
HCT VFR BLD AUTO: 35.4 % (ref 36–46)
HGB BLD-MCNC: 12.7 G/DL (ref 12–16)
LYMPHOCYTES NFR BLD: 3.4 K/UL (ref 1–4.8)
LYMPHOCYTES RELATIVE PERCENT: 37 % (ref 24–44)
MAGNESIUM SERPL-MCNC: 1 MG/DL (ref 1.6–2.6)
MCH RBC QN AUTO: 34.3 PG (ref 26–34)
MCHC RBC AUTO-ENTMCNC: 35.9 G/DL (ref 31–37)
MCV RBC AUTO: 95.5 FL (ref 80–100)
MONOCYTES NFR BLD: 0.9 K/UL (ref 0.1–1.2)
MONOCYTES NFR BLD: 10 % (ref 2–11)
NEUTROPHILS NFR BLD: 51 % (ref 36–66)
NEUTS SEG NFR BLD: 4.7 K/UL (ref 1.8–7.7)
PLATELET # BLD AUTO: 346 K/UL (ref 140–450)
PMV BLD AUTO: 8 FL (ref 6–12)
POTASSIUM SERPL-SCNC: 3 MMOL/L (ref 3.7–5.3)
PROT SERPL-MCNC: 6.6 G/DL (ref 6.4–8.3)
RBC # BLD AUTO: 3.71 M/UL (ref 4–5.2)
SODIUM SERPL-SCNC: 132 MMOL/L (ref 135–144)
TROPONIN I SERPL HS-MCNC: 13 NG/L (ref 0–14)
WBC OTHER # BLD: 9.3 K/UL (ref 3.5–11)

## 2023-12-21 PROCEDURE — 6360000002 HC RX W HCPCS

## 2023-12-21 PROCEDURE — APPSS45 APP SPLIT SHARED TIME 31-45 MINUTES

## 2023-12-21 PROCEDURE — 96361 HYDRATE IV INFUSION ADD-ON: CPT

## 2023-12-21 PROCEDURE — 99285 EMERGENCY DEPT VISIT HI MDM: CPT

## 2023-12-21 PROCEDURE — 70450 CT HEAD/BRAIN W/O DYE: CPT

## 2023-12-21 PROCEDURE — 96374 THER/PROPH/DIAG INJ IV PUSH: CPT

## 2023-12-21 PROCEDURE — 6370000000 HC RX 637 (ALT 250 FOR IP)

## 2023-12-21 PROCEDURE — 80053 COMPREHEN METABOLIC PANEL: CPT

## 2023-12-21 PROCEDURE — 96375 TX/PRO/DX INJ NEW DRUG ADDON: CPT

## 2023-12-21 PROCEDURE — 2580000003 HC RX 258

## 2023-12-21 PROCEDURE — G0378 HOSPITAL OBSERVATION PER HR: HCPCS

## 2023-12-21 PROCEDURE — 96366 THER/PROPH/DIAG IV INF ADDON: CPT

## 2023-12-21 PROCEDURE — 83735 ASSAY OF MAGNESIUM: CPT

## 2023-12-21 PROCEDURE — 96376 TX/PRO/DX INJ SAME DRUG ADON: CPT

## 2023-12-21 PROCEDURE — 85025 COMPLETE CBC W/AUTO DIFF WBC: CPT

## 2023-12-21 PROCEDURE — 36415 COLL VENOUS BLD VENIPUNCTURE: CPT

## 2023-12-21 PROCEDURE — 96365 THER/PROPH/DIAG IV INF INIT: CPT

## 2023-12-21 PROCEDURE — 99222 1ST HOSP IP/OBS MODERATE 55: CPT | Performed by: HOSPITALIST

## 2023-12-21 PROCEDURE — 93005 ELECTROCARDIOGRAM TRACING: CPT

## 2023-12-21 PROCEDURE — 84484 ASSAY OF TROPONIN QUANT: CPT

## 2023-12-21 RX ORDER — ENOXAPARIN SODIUM 100 MG/ML
30 INJECTION SUBCUTANEOUS 2 TIMES DAILY
Status: DISCONTINUED | OUTPATIENT
Start: 2023-12-21 | End: 2023-12-22 | Stop reason: HOSPADM

## 2023-12-21 RX ORDER — POTASSIUM CHLORIDE 7.45 MG/ML
10 INJECTION INTRAVENOUS
Status: COMPLETED | OUTPATIENT
Start: 2023-12-21 | End: 2023-12-22

## 2023-12-21 RX ORDER — ONDANSETRON 2 MG/ML
4 INJECTION INTRAMUSCULAR; INTRAVENOUS ONCE
Status: COMPLETED | OUTPATIENT
Start: 2023-12-21 | End: 2023-12-21

## 2023-12-21 RX ORDER — ACETAMINOPHEN 650 MG/1
650 SUPPOSITORY RECTAL EVERY 6 HOURS PRN
Status: CANCELLED | OUTPATIENT
Start: 2023-12-21

## 2023-12-21 RX ORDER — MECLIZINE HCL 12.5 MG/1
25 TABLET ORAL 3 TIMES DAILY
Status: DISCONTINUED | OUTPATIENT
Start: 2023-12-21 | End: 2023-12-22 | Stop reason: HOSPADM

## 2023-12-21 RX ORDER — MAGNESIUM SULFATE IN WATER 40 MG/ML
2000 INJECTION, SOLUTION INTRAVENOUS PRN
Status: DISCONTINUED | OUTPATIENT
Start: 2023-12-21 | End: 2023-12-22 | Stop reason: HOSPADM

## 2023-12-21 RX ORDER — POTASSIUM CHLORIDE 20 MEQ/1
40 TABLET, EXTENDED RELEASE ORAL PRN
Status: DISCONTINUED | OUTPATIENT
Start: 2023-12-21 | End: 2023-12-22 | Stop reason: HOSPADM

## 2023-12-21 RX ORDER — POTASSIUM CHLORIDE 7.45 MG/ML
10 INJECTION INTRAVENOUS PRN
Status: DISCONTINUED | OUTPATIENT
Start: 2023-12-21 | End: 2023-12-22 | Stop reason: HOSPADM

## 2023-12-21 RX ORDER — SODIUM CHLORIDE 0.9 % (FLUSH) 0.9 %
5-40 SYRINGE (ML) INJECTION PRN
Status: DISCONTINUED | OUTPATIENT
Start: 2023-12-21 | End: 2023-12-22 | Stop reason: HOSPADM

## 2023-12-21 RX ORDER — MAGNESIUM SULFATE HEPTAHYDRATE 40 MG/ML
4000 INJECTION, SOLUTION INTRAVENOUS ONCE
Status: COMPLETED | OUTPATIENT
Start: 2023-12-21 | End: 2023-12-21

## 2023-12-21 RX ORDER — ONDANSETRON 4 MG/1
4 TABLET, ORALLY DISINTEGRATING ORAL EVERY 8 HOURS PRN
Status: DISCONTINUED | OUTPATIENT
Start: 2023-12-21 | End: 2023-12-22 | Stop reason: HOSPADM

## 2023-12-21 RX ORDER — SODIUM CHLORIDE 9 MG/ML
INJECTION, SOLUTION INTRAVENOUS CONTINUOUS
Status: DISCONTINUED | OUTPATIENT
Start: 2023-12-21 | End: 2023-12-22 | Stop reason: HOSPADM

## 2023-12-21 RX ORDER — SODIUM CHLORIDE, SODIUM LACTATE, POTASSIUM CHLORIDE, AND CALCIUM CHLORIDE .6; .31; .03; .02 G/100ML; G/100ML; G/100ML; G/100ML
1000 INJECTION, SOLUTION INTRAVENOUS ONCE
Status: COMPLETED | OUTPATIENT
Start: 2023-12-21 | End: 2023-12-21

## 2023-12-21 RX ORDER — SODIUM CHLORIDE 9 MG/ML
INJECTION, SOLUTION INTRAVENOUS PRN
Status: DISCONTINUED | OUTPATIENT
Start: 2023-12-21 | End: 2023-12-22 | Stop reason: HOSPADM

## 2023-12-21 RX ORDER — MECLIZINE HCL 12.5 MG/1
25 TABLET ORAL ONCE
Status: COMPLETED | OUTPATIENT
Start: 2023-12-21 | End: 2023-12-21

## 2023-12-21 RX ORDER — POLYETHYLENE GLYCOL 3350 17 G/17G
17 POWDER, FOR SOLUTION ORAL DAILY PRN
Status: DISCONTINUED | OUTPATIENT
Start: 2023-12-21 | End: 2023-12-22 | Stop reason: HOSPADM

## 2023-12-21 RX ORDER — ACETAMINOPHEN 325 MG/1
650 TABLET ORAL EVERY 6 HOURS PRN
Status: CANCELLED | OUTPATIENT
Start: 2023-12-21

## 2023-12-21 RX ORDER — ONDANSETRON 2 MG/ML
4 INJECTION INTRAMUSCULAR; INTRAVENOUS EVERY 6 HOURS PRN
Status: DISCONTINUED | OUTPATIENT
Start: 2023-12-21 | End: 2023-12-22 | Stop reason: HOSPADM

## 2023-12-21 RX ORDER — SODIUM CHLORIDE 0.9 % (FLUSH) 0.9 %
5-40 SYRINGE (ML) INJECTION EVERY 12 HOURS SCHEDULED
Status: DISCONTINUED | OUTPATIENT
Start: 2023-12-21 | End: 2023-12-22 | Stop reason: HOSPADM

## 2023-12-21 RX ADMIN — SODIUM CHLORIDE: 9 INJECTION, SOLUTION INTRAVENOUS at 17:24

## 2023-12-21 RX ADMIN — MECLIZINE 25 MG: 12.5 TABLET ORAL at 16:25

## 2023-12-21 RX ADMIN — ONDANSETRON 4 MG: 2 INJECTION INTRAMUSCULAR; INTRAVENOUS at 16:25

## 2023-12-21 RX ADMIN — MECLIZINE 25 MG: 12.5 TABLET ORAL at 20:34

## 2023-12-21 RX ADMIN — Medication 10 MEQ: at 20:58

## 2023-12-21 RX ADMIN — MAGNESIUM SULFATE HEPTAHYDRATE 4000 MG: 40 INJECTION, SOLUTION INTRAVENOUS at 17:24

## 2023-12-21 RX ADMIN — Medication 10 MEQ: at 17:29

## 2023-12-21 RX ADMIN — SODIUM CHLORIDE, POTASSIUM CHLORIDE, SODIUM LACTATE AND CALCIUM CHLORIDE 1000 ML: 600; 310; 30; 20 INJECTION, SOLUTION INTRAVENOUS at 16:26

## 2023-12-21 RX ADMIN — POTASSIUM CHLORIDE 10 MEQ: 7.46 INJECTION, SOLUTION INTRAVENOUS at 22:36

## 2023-12-21 RX ADMIN — Medication 10 MEQ: at 19:56

## 2023-12-21 NOTE — H&P
[facility]  Office: [address], DO, Pete Iverson, DO, Cash Brooke, DO, Jessica Barbosa Blood, DO, Suzanne Rubio MD, Fuentes Galaviz MD, Dwain Quintana MD, Annika Cid MD,  Aysha Nix MD, Allison Vasquez MD, Ronald Calvin MD,  Jovan Nur MD, Beena Cavazos MD, Oscar Stokes, DO, Rei Crowley MD,  Jyoti Jimenez DO, Davide David MD, Toshia Lyle MD, Soo Granda MD, Denilson Bailey MD,  Lizette Islas MD, Kerri Rosenberg MD, Jaylyn Cardoso MD, Valentine Keith MD, Allison Schrader MD, Philippe Deng MD, Gracy Vazquez DO, Delcia Epley, DO, Paula Austin MD,  Rush Barros MD, Esther Beverly, CNP,  Magdalena Graham, CNP, Charisse Austin, CNP,  Loco Gonzalez, BERNA, Saravanan Acosta, CNP, Roberth Mayorga, CNP, Bambi Bynum, CNP, Rafa Cody, CNP, Karena Rodriguez, CNP, Miriam Morgan PARebeccaC, Heena Pritchard PA-C, jC, CNP, Bhupendra Quinteros, CNS, Mick Hough, CNP, Romina Pitts CNP, [facility]    HISTORY AND PHYSICAL EXAMINATION            Date:   12/21/2023  Patient name:  Luci Pérez  Date of admission:  12/21/2023  3:48 PM  MRN:   [id number]  Account:  [de-identified]  YOB: 1960  PCP:    CONCHIS Martinez CNP  Room:   ER11/ER11  Code Status:    Prior      History Obtained From:     patient, electronic medical record    History of Present Illness:     Luci Pérez is a 59 y.o. Non- of /a female who presents with Dizziness   and is admitted to the hospital for the management of Dizziness. Patient is a 70-year-old female who is admitted for management of dizziness and electrolyte abnormalities. Patient states that she has had a history of BPPV in the past, however she recently stopped taking all of the medications prescribed by her PCP because she was \"tired of taking so many. \"  Patient states that last Friday, she had noted  Mouth: mucous membranes dry  Neck: supple, no carotid bruits, thyroid not palpable  Lungs: Bilateral equal air entry, diminished breath sounds, normal effort  Cardiovascular: Tachycardic, regular rhythm, no murmur, gallop, rub  Abdomen: Soft, nontender, nondistended, no hepatomegaly or splenomegaly  Neurologic: There are no new focal motor or sensory deficits, normal muscle tone and bulk, no abnormal sensation, normal speech, cranial nerves II through XII grossly intact  Skin: Bruise noted on right shoulder, no other gross lesions, rashes, bruising or bleeding on exposed skin area  Extremities:  peripheral pulses palpable, calf pain with palpation, bilateral pedal edema L> R  Psych: normal affect     Investigations:      Laboratory Testing:  Recent Results (from the past 24 hour(s))   CBC with Auto Differential    Collection Time: 12/21/23  3:56 PM   Result Value Ref Range    WBC 9.3 3.5 - 11.0 k/uL    RBC 3.71 (L) 4.0 - 5.2 m/uL    Hemoglobin 12.7 12.0 - 16.0 g/dL    Hematocrit 35.4 (L) 36 - 46 %    MCV 95.5 80 - 100 fL    MCH 34.3 (H) 26 - 34 pg    MCHC 35.9 31 - 37 g/dL    RDW 13.6 12.5 - 15.4 %    Platelets 520 373 - 906 k/uL    MPV 8.0 6.0 - 12.0 fL    Neutrophils % 51 36 - 66 %    Lymphocytes % 37 24 - 44 %    Monocytes % 10 2 - 11 %    Eosinophils % 1 1 - 4 %    Basophils % 1 0 - 2 %    Neutrophils Absolute 4.70 1.8 - 7.7 k/uL    Lymphocytes Absolute 3.40 1.0 - 4.8 k/uL    Monocytes Absolute 0.90 0.1 - 1.2 k/uL    Eosinophils Absolute 0.10 0.0 - 0.4 k/uL    Basophils Absolute 0.10 0.0 - 0.2 k/uL   CMP    Collection Time: 12/21/23  3:56 PM   Result Value Ref Range    Sodium 132 (L) 135 - 144 mmol/L    Potassium 3.0 (L) 3.7 - 5.3 mmol/L    Chloride 91 (L) 98 - 107 mmol/L    CO2 26 20 - 31 mmol/L    Anion Gap 15 9 - 17 mmol/L    Glucose 125 (H) 70 - 99 mg/dL    BUN 14 8 - 23 mg/dL    Creatinine 1.2 (H) 0.5 - 0.9 mg/dL    Est, Glom Filt Rate 51 (L) >60 mL/min/1.73m2    Calcium 8.0 (L) 8.6 - 10.4 mg/dL

## 2023-12-21 NOTE — ED PROVIDER NOTES
[facility] [address] [facility]  Emergency Department Encounter  Mid Level Provider     Pt Name: Bethany Saldaña  MRN: [id number]  [address] [facility] 1960  Date of evaluation: 23  PCP:  Fonda Runner, APRN - [facility]       Chief Complaint   Patient presents with    Dizziness       HISTORY OF PRESENT ILLNESS  (Location/Symptom, Timing/Onset,Context/Setting, Quality, Duration, Modifying Factors, Severity.)      Bethany Saldaña is a 59 y.o. female who presents with complaints of vertigo and dizziness that has been ongoing for approximately 1 week. She reports a history of vertigo and has not had an episode in several years however on Friday last week she had an episode of vertigo where she lost her balance and struck her head against her doorknob. Since then she has been persistently dizzy with movement and has had persistent nausea with episodes of emesis as well. She denies any chest pain or shortness of breath associated with the symptoms. She did not lose consciousness when she had the head injury however feels that her symptoms have been progressively worsening since    [address] / SOCIAL / FAMILY HISTORY      has a past medical history of Anemia, DVT (deep venous thrombosis) ([address]), History of depression, History of spontaneous , Hyperlipidemia, Hypertension, Morbid obesity ([address]), and Vision abnormalities. has a past surgical history that includes Cholecystectomy (); hernia repair (Right, ); and Colonoscopy.     Social History     Socioeconomic History    Marital status:      Spouse name: Not on file    Number of children: Not on file    Years of education: Not on file    Highest education level: Not on file   Occupational History    Not on file   Tobacco Use    Smoking status: Never    Smokeless tobacco: Never   Vaping Use    Vaping Use: Never used   Substance and Sexual Activity    Alcohol use: Yes     Comment: Occassional    Drug use: No    Sexual activity: Group     ondansetron (ZOFRAN-ODT) disintegrating tablet 4 mg     ondansetron (ZOFRAN) injection 4 mg    polyethylene glycol (GLYCOLAX) packet 17 g    0.9 % sodium chloride infusion    meclizine (ANTIVERT) tablet 25 mg    melatonin tablet 6 mg       Controlled Substances Monitoring:      DIAGNOSTIC RESULTS / RE-EVALUATION     Radiology:   I directly visualized(with the attending physician) the following  images and reviewed the radiologist interpretations:  No results found. CT Head W/O Contrast   Final Result   No acute intracranial abnormality.              Labs:  Results for orders placed or performed during the hospital encounter of 12/21/23   CBC with Auto Differential   Result Value Ref Range    WBC 9.3 3.5 - 11.0 k/uL    RBC 3.71 (L) 4.0 - 5.2 m/uL    Hemoglobin 12.7 12.0 - 16.0 g/dL    Hematocrit 35.4 (L) 36 - 46 %    MCV 95.5 80 - 100 fL    MCH 34.3 (H) 26 - 34 pg    MCHC 35.9 31 - 37 g/dL    RDW 13.6 12.5 - 15.4 %    Platelets 809 264 - 816 k/uL    MPV 8.0 6.0 - 12.0 fL    Neutrophils % 51 36 - 66 %    Lymphocytes % 37 24 - 44 %    Monocytes % 10 2 - 11 %    Eosinophils % 1 1 - 4 %    Basophils % 1 0 - 2 %    Neutrophils Absolute 4.70 1.8 - 7.7 k/uL    Lymphocytes Absolute 3.40 1.0 - 4.8 k/uL    Monocytes Absolute 0.90 0.1 - 1.2 k/uL    Eosinophils Absolute 0.10 0.0 - 0.4 k/uL    Basophils Absolute 0.10 0.0 - 0.2 k/uL   CMP   Result Value Ref Range    Sodium 132 (L) 135 - 144 mmol/L    Potassium 3.0 (L) 3.7 - 5.3 mmol/L    Chloride 91 (L) 98 - 107 mmol/L    CO2 26 20 - 31 mmol/L    Anion Gap 15 9 - 17 mmol/L    Glucose 125 (H) 70 - 99 mg/dL    BUN 14 8 - 23 mg/dL    Creatinine 1.2 (H) 0.5 - 0.9 mg/dL    Est, Glom Filt Rate 51 (L) >60 mL/min/1.73m2    Calcium 8.0 (L) 8.6 - 10.4 mg/dL    Total Protein 6.6 6.4 - 8.3 g/dL    Albumin 2.9 (L) 3.5 - 5.2 g/dL    Albumin/Globulin Ratio 0.8 (L) 1.0 - 2.5    Total Bilirubin 1.3 (H) 0.3 - 1.2 mg/dL    Alkaline Phosphatase 174 (H) 35 - 104 U/L    ALT 60 (H) 5 - 33

## 2023-12-22 ENCOUNTER — APPOINTMENT (OUTPATIENT)
Dept: ULTRASOUND IMAGING | Age: 63
End: 2023-12-22
Attending: HOSPITALIST
Payer: COMMERCIAL

## 2023-12-22 VITALS
HEIGHT: 64 IN | HEART RATE: 98 BPM | DIASTOLIC BLOOD PRESSURE: 63 MMHG | RESPIRATION RATE: 20 BRPM | WEIGHT: 261.25 LBS | TEMPERATURE: 97.9 F | BODY MASS INDEX: 44.6 KG/M2 | SYSTOLIC BLOOD PRESSURE: 137 MMHG | OXYGEN SATURATION: 97 %

## 2023-12-22 LAB
ALBUMIN SERPL-MCNC: 2.7 G/DL (ref 3.5–5.2)
ALBUMIN/GLOB SERPL: 0.8 {RATIO} (ref 1–2.5)
ALP SERPL-CCNC: 150 U/L (ref 35–104)
ALT SERPL-CCNC: 48 U/L (ref 5–33)
ANION GAP SERPL CALCULATED.3IONS-SCNC: 10 MMOL/L (ref 9–17)
AST SERPL-CCNC: 75 U/L
BILIRUB SERPL-MCNC: 1.5 MG/DL (ref 0.3–1.2)
BUN SERPL-MCNC: 12 MG/DL (ref 8–23)
CALCIUM SERPL-MCNC: 8 MG/DL (ref 8.6–10.4)
CHLORIDE SERPL-SCNC: 94 MMOL/L (ref 98–107)
CO2 SERPL-SCNC: 27 MMOL/L (ref 20–31)
CREAT SERPL-MCNC: 1.3 MG/DL (ref 0.5–0.9)
EKG ATRIAL RATE: 101 BPM
EKG P AXIS: 24 DEGREES
EKG P-R INTERVAL: 170 MS
EKG Q-T INTERVAL: 366 MS
EKG QRS DURATION: 82 MS
EKG QTC CALCULATION (BAZETT): 474 MS
EKG R AXIS: 87 DEGREES
EKG T AXIS: 57 DEGREES
EKG VENTRICULAR RATE: 101 BPM
GFR SERPL CREATININE-BSD FRML MDRD: 46 ML/MIN/1.73M2
GLUCOSE SERPL-MCNC: 107 MG/DL (ref 70–99)
MAGNESIUM SERPL-MCNC: 2 MG/DL (ref 1.6–2.6)
POTASSIUM SERPL-SCNC: 3.6 MMOL/L (ref 3.7–5.3)
PROT SERPL-MCNC: 6.1 G/DL (ref 6.4–8.3)
SODIUM SERPL-SCNC: 131 MMOL/L (ref 135–144)

## 2023-12-22 PROCEDURE — 96367 TX/PROPH/DG ADDL SEQ IV INF: CPT

## 2023-12-22 PROCEDURE — 96366 THER/PROPH/DIAG IV INF ADDON: CPT

## 2023-12-22 PROCEDURE — 6360000002 HC RX W HCPCS

## 2023-12-22 PROCEDURE — 80053 COMPREHEN METABOLIC PANEL: CPT

## 2023-12-22 PROCEDURE — G0378 HOSPITAL OBSERVATION PER HR: HCPCS

## 2023-12-22 PROCEDURE — 96372 THER/PROPH/DIAG INJ SC/IM: CPT

## 2023-12-22 PROCEDURE — 36415 COLL VENOUS BLD VENIPUNCTURE: CPT

## 2023-12-22 PROCEDURE — 6370000000 HC RX 637 (ALT 250 FOR IP)

## 2023-12-22 PROCEDURE — APPSS30 APP SPLIT SHARED TIME 16-30 MINUTES

## 2023-12-22 PROCEDURE — 83735 ASSAY OF MAGNESIUM: CPT

## 2023-12-22 PROCEDURE — 6370000000 HC RX 637 (ALT 250 FOR IP): Performed by: NURSE PRACTITIONER

## 2023-12-22 PROCEDURE — 99232 SBSQ HOSP IP/OBS MODERATE 35: CPT | Performed by: HOSPITALIST

## 2023-12-22 PROCEDURE — 76705 ECHO EXAM OF ABDOMEN: CPT

## 2023-12-22 RX ORDER — LANOLIN ALCOHOL/MO/W.PET/CERES
6 CREAM (GRAM) TOPICAL NIGHTLY PRN
Status: DISCONTINUED | OUTPATIENT
Start: 2023-12-22 | End: 2023-12-22 | Stop reason: HOSPADM

## 2023-12-22 RX ORDER — MECLIZINE HYDROCHLORIDE 25 MG/1
25 TABLET ORAL 3 TIMES DAILY
Qty: 30 TABLET | Refills: 0 | Status: SHIPPED | OUTPATIENT
Start: 2023-12-22 | End: 2024-01-01

## 2023-12-22 RX ADMIN — MECLIZINE 25 MG: 12.5 TABLET ORAL at 14:43

## 2023-12-22 RX ADMIN — Medication 6 MG: at 00:36

## 2023-12-22 RX ADMIN — Medication 10 MEQ: at 00:00

## 2023-12-22 RX ADMIN — POTASSIUM CHLORIDE 10 MEQ: 7.46 INJECTION, SOLUTION INTRAVENOUS at 01:14

## 2023-12-22 RX ADMIN — ENOXAPARIN SODIUM 30 MG: 100 INJECTION SUBCUTANEOUS at 08:29

## 2023-12-22 RX ADMIN — POTASSIUM CHLORIDE 40 MEQ: 1500 TABLET, EXTENDED RELEASE ORAL at 08:29

## 2023-12-22 RX ADMIN — MECLIZINE 25 MG: 12.5 TABLET ORAL at 08:29

## 2023-12-22 NOTE — PLAN OF CARE
Problem: Discharge Planning  Goal: Discharge to home or other facility with appropriate resources  12/22/2023 1420 by Jorge Clifford RN  Outcome: Adequate for Discharge  12/22/2023 0334 by Gustavo Calvert RN  Outcome: Progressing     Problem: ABCDS Injury Assessment  Goal: Absence of physical injury  12/22/2023 1420 by Jorge Clifford RN  Outcome: Adequate for Discharge  12/22/2023 0334 by Gustavo Calvert RN  Outcome: Progressing     Problem: Safety - Adult  Goal: Free from fall injury  12/22/2023 1420 by Jorge Clifford RN  Outcome: Adequate for Discharge  12/22/2023 0334 by Gustavo Calvert RN  Outcome: Progressing

## 2023-12-22 NOTE — DISCHARGE SUMMARY
[facility]  Office: [address], DO, oSnia Guerrier, DO, Esa Wharton, DO, Glendell Bence Blood, DO, Nolvia Conner MD, Dimitrios Javier MD, Darwin Oliver MD, Una Green MD,  Cece Vega MD, Fabby Lofton MD, Mark Luna MD,  Sonia Ortiz MD, Abdiaziz Torres MD, Maureen Valles DO, Willow Snellen, MD,  Cammie Garzon DO, Lauren Huang MD, Rico Obando MD, Coretta Alexander MD, Calixto Ferrera MD,  Marie Caro MD, Marlon Bletre MD, Meliza Peterson MD, Thomas Hector MD, Eliecer Leger MD, Cleopatra Carpenter MD, Jarad Brock DO, Yaneth Godinez DO, Keren Husain MD,  Trevor Gomez MD, Georgie Antoine, CNP,  Tony Hdz, CNP, Sudeep Yanez, CNP,  Antoni Griffin, DNP, Jose Juan Velazquez, CNP, Melania Ayala, CNP, Annia Carrillo, CNP, Doristine Olszewski, CNP, Luis Adames, CNP, Torey Wisdom PARebeccaC, FLORENCIA CallahanC, Cj, CNP, Priti Villavicencio, CNS, Lorie Carvalho, CNP, Melany Christensen, CNP, Tejas Hernandez, CNP         [facility]    Discharge Summary     Patient ID: Arabella King  :  1960   MRN: [id number]     ACCOUNT:  [de-identified]   Patient's PCP: Glori Severe, APRN - CNP  Admit Date: 2023   Discharge Date: 2023  Length of Stay: 0  Code Status:  Full Code  Admitting Physician: Leslie Gifford DO  Discharge Physician: BRAEDEN Arnold     Active Discharge Diagnoses:     Hospital Problem Lists:  Principal Problem:    Dizziness  Active Problems:    Hypokalemia    Hypomagnesemia    Transaminitis    Nausea  Resolved Problems:    * No resolved hospital problems.  *      Admission Condition:  fair     Discharged Condition: good    Hospital Stay:     Hospital Course:  Arabella King is a 59 y.o. female who was admitted for the management of Dizziness , presented to ER with Dizziness    Patient is a very pleasant 43-year-old female who was admitted for management of dizziness,

## 2023-12-22 NOTE — CARE COORDINATION
Case Management Assessment  Initial Evaluation    Date/Time of Evaluation: 12/22/2023 5:05 PM  Assessment Completed by: Sukhdeep Monroe RN    If patient is discharged prior to next notation, then this note serves as note for discharge by case management. Patient Name: Vonda Velez                   YOB: 1960  Diagnosis: Electrolyte and fluid disorder [E87.8]  Dizziness [R42]                   Date / Time: 12/21/2023  3:48 PM    Patient Admission Status: Observation   Readmission Risk (Low < 19, Mod (19-27), High > 27): No data recorded  Current PCP: CONCHIS Renee CNP  PCP verified by CM? Yes    Chart Reviewed: Yes      History Provided by: Patient  Patient Orientation: Alert and Oriented    Patient Cognition: Alert    Hospitalization in the last 30 days (Readmission):  No    If yes, Readmission Assessment in CM Navigator will be completed. Advance Directives:      Code Status: Prior   Patient's Primary Decision Maker is:        Discharge Planning:    Patient lives with: Spouse/Significant Other Type of Home: Apartment  Primary Care Giver: Self  Patient Support Systems include: Spouse/Significant Other   Current Financial resources:  (Commercial)  Current community resources: None  Current services prior to admission: None            Current DME:              Type of Home Care services:  None    ADLS  Prior functional level: Independent in ADLs/IADLs  Current functional level: Independent in ADLs/IADLs    PT AM-PAC:   /24  OT AM-PAC:   /24    Family can provide assistance at DC: Yes  Would you like Case Management to discuss the discharge plan with any other family members/significant others, and if so, who?  No  Plans to Return to Present Housing: Yes  Other Identified Issues/Barriers to RETURNING to current housing: none  Potential Assistance needed at discharge: N/A            Potential DME:    Patient expects to discharge to: [address] [place] [address] Road for transportation at discharge:

## 2023-12-22 NOTE — PROGRESS NOTES
[facility]  Office: [address], DO, Marshall Butterfield, DO, Beth Jaquez, DO, Yohannes Giselle Schroeder, DO, Bobby Reid MD, Wisam Granda MD, Mary Birmingham MD, Leta Pizano MD,  Chad Lynn MD, Robyn Arzate MD, Shubham Colon MD,  Baljeet Quick MD, Richard Sandoval MD, Enoc Butt DO, Kyung Casas MD,  Beatrice Thomas DO, Frances Pérez MD, Telma Ortez MD, Sameer Lowery MD, Zachary Shane MD,  Alejandro Dunne MD, Corrie Galaviz MD, Ferny Manuel MD, June Morrison MD, Ubaldo Cunningham MD, Maribel Basurto MD, Jasmyn Sorensen DO, Deena Nichols DO, Narcisa Rivera MD,  Alphonso Dinh MD, Cande Aguilar CNP,  Angella Allred, CNP, Lynda Gottron, CNP,  Criselda Estes, BERNA, Katy George, CNP, Laney Walker, CNP, Negin Wing, CNP, Marie Buitrago, CNP, Delroy Jimenez, CNP, Pedro Luis Villarreal PA-C, FLORENCIA CallahanC, Cj, CNP, Ruben Bateman, FRANKLYN, Cornelia Briceño, CNP, Trish Mohs, CNP, Sydni Beatty, [address] [place] [address]    Progress Note    12/22/2023    8:26 AM    Name:   Yanique Tafoya  MRN:     [id number]     [address]:      <[facility]>   Room:   305/305-01   Day:  0  Admit Date:  12/21/2023  3:48 PM    PCP:   CONCHIS Mondragon CNP  Code Status:  Full Code    Subjective:     Patient is a 59-year-old female who is seen in follow-up for dizziness and electrolyte imbalances. No acute events overnight. Denies fevers, chills, chest pain, shortness of breath. Patient's potassium has improved from 3.0-3.6, patient's magnesium has improved from 1.0-2.0. I did discuss with the patient the importance of taking her electrolyte supplements that were prescribed by her PCP. I also encouraged the patient to follow-up with her PCP and continue to take meclizine as an outpatient. Patient was agreeable and thankful for this information.   I informed the patient that her liver enzymes were elevated and

## 2024-01-22 RX ORDER — CITALOPRAM HYDROBROMIDE 10 MG/1
10 TABLET ORAL DAILY
Qty: 30 TABLET | Refills: 0 | Status: SHIPPED | OUTPATIENT
Start: 2024-01-22

## 2024-01-31 ENCOUNTER — HOSPITAL ENCOUNTER (OUTPATIENT)
Age: 64
Setting detail: SPECIMEN
Discharge: HOME | End: 2024-01-31

## 2024-01-31 DIAGNOSIS — E53.8 B12 DEFICIENCY: ICD-10-CM

## 2024-01-31 DIAGNOSIS — E83.51 HYPOCALCEMIA: ICD-10-CM

## 2024-01-31 DIAGNOSIS — R17 ELEVATED BILIRUBIN: ICD-10-CM

## 2024-01-31 DIAGNOSIS — E72.11 HYPERHOMOCYSTEINEMIA (HCC): ICD-10-CM

## 2024-01-31 DIAGNOSIS — E55.9 VITAMIN D DEFICIENCY: ICD-10-CM

## 2024-01-31 DIAGNOSIS — E83.42 HYPOMAGNESEMIA: ICD-10-CM

## 2024-01-31 DIAGNOSIS — E53.1 VITAMIN B6 DEFICIENCY: ICD-10-CM

## 2024-01-31 LAB
25(OH)D3 SERPL-MCNC: 35.4 NG/ML
ALBUMIN SERPL-MCNC: 3.3 G/DL (ref 3.5–5.2)
ALBUMIN/GLOB SERPL: 1 {RATIO} (ref 1–2.5)
ALP SERPL-CCNC: 109 U/L (ref 35–104)
ALT SERPL-CCNC: 26 U/L (ref 5–33)
AST SERPL-CCNC: 40 U/L
BILIRUB DIRECT SERPL-MCNC: 0.3 MG/DL
BILIRUB INDIRECT SERPL-MCNC: 0.7 MG/DL (ref 0–1)
BILIRUB SERPL-MCNC: 1 MG/DL (ref 0.3–1.2)
CA-I BLD-SCNC: 1.22 MMOL/L (ref 1.13–1.33)
FOLATE SERPL-MCNC: 5.1 NG/ML (ref 4.8–24.2)
HCYS SERPL-SCNC: 21.7 UMOL/L
MAGNESIUM SERPL-MCNC: 1.4 MG/DL (ref 1.6–2.6)
PROT SERPL-MCNC: 6.6 G/DL (ref 6.4–8.3)
PTH-INTACT SERPL-MCNC: 72 PG/ML (ref 14–72)
VIT B12 SERPL-MCNC: 1353 PG/ML (ref 232–1245)

## 2024-02-02 ENCOUNTER — OFFICE VISIT (OUTPATIENT)
Dept: FAMILY MEDICINE CLINIC | Age: 64
End: 2024-02-02

## 2024-02-02 VITALS
OXYGEN SATURATION: 99 % | RESPIRATION RATE: 16 BRPM | TEMPERATURE: 97.4 F | BODY MASS INDEX: 42.23 KG/M2 | SYSTOLIC BLOOD PRESSURE: 134 MMHG | WEIGHT: 246 LBS | DIASTOLIC BLOOD PRESSURE: 80 MMHG | HEART RATE: 98 BPM

## 2024-02-02 DIAGNOSIS — E55.9 VITAMIN D DEFICIENCY: ICD-10-CM

## 2024-02-02 DIAGNOSIS — E83.51 HYPOCALCEMIA: ICD-10-CM

## 2024-02-02 DIAGNOSIS — I10 PRIMARY HYPERTENSION: ICD-10-CM

## 2024-02-02 DIAGNOSIS — E83.42 HYPOMAGNESEMIA: ICD-10-CM

## 2024-02-02 DIAGNOSIS — E78.2 MIXED HYPERLIPIDEMIA: ICD-10-CM

## 2024-02-02 DIAGNOSIS — Z12.4 CERVICAL CANCER SCREENING: ICD-10-CM

## 2024-02-02 DIAGNOSIS — E72.11 HYPERHOMOCYSTEINEMIA (HCC): ICD-10-CM

## 2024-02-02 DIAGNOSIS — R73.01 ELEVATED FASTING GLUCOSE: ICD-10-CM

## 2024-02-02 DIAGNOSIS — I82.452 ACUTE DEEP VEIN THROMBOSIS (DVT) OF LEFT PERONEAL VEIN (HCC): ICD-10-CM

## 2024-02-02 DIAGNOSIS — D68.59 ANTITHROMBIN III DEFICIENCY (HCC): ICD-10-CM

## 2024-02-02 DIAGNOSIS — E87.6 HYPOKALEMIA: ICD-10-CM

## 2024-02-02 DIAGNOSIS — R79.89 ELEVATED PARATHYROID HORMONE: ICD-10-CM

## 2024-02-02 DIAGNOSIS — I26.99 BILATERAL PULMONARY EMBOLISM (HCC): ICD-10-CM

## 2024-02-02 DIAGNOSIS — E53.8 B12 DEFICIENCY: ICD-10-CM

## 2024-02-02 DIAGNOSIS — Z12.31 ENCOUNTER FOR SCREENING MAMMOGRAM FOR BREAST CANCER: Primary | ICD-10-CM

## 2024-02-02 RX ORDER — POTASSIUM CHLORIDE 20 MEQ/1
20 TABLET, EXTENDED RELEASE ORAL DAILY
Qty: 30 TABLET | Refills: 1 | Status: SHIPPED | OUTPATIENT
Start: 2024-02-02

## 2024-02-02 RX ORDER — BACLOFEN 20 MG
2 TABLET ORAL DAILY
Qty: 90 TABLET | Refills: 1
Start: 2024-02-02

## 2024-02-02 RX ORDER — AMLODIPINE BESYLATE 5 MG/1
5 TABLET ORAL DAILY
Qty: 90 TABLET | Refills: 1 | Status: SHIPPED | OUTPATIENT
Start: 2024-02-02

## 2024-02-02 RX ORDER — ERGOCALCIFEROL 1.25 MG/1
50000 CAPSULE ORAL WEEKLY
Qty: 4 CAPSULE | Refills: 3 | Status: SHIPPED | OUTPATIENT
Start: 2024-02-02

## 2024-02-02 ASSESSMENT — PATIENT HEALTH QUESTIONNAIRE - PHQ9
SUM OF ALL RESPONSES TO PHQ QUESTIONS 1-9: 0
SUM OF ALL RESPONSES TO PHQ9 QUESTIONS 1 & 2: 0
1. LITTLE INTEREST OR PLEASURE IN DOING THINGS: 0
SUM OF ALL RESPONSES TO PHQ QUESTIONS 1-9: 0
2. FEELING DOWN, DEPRESSED OR HOPELESS: 0

## 2024-02-02 ASSESSMENT — ENCOUNTER SYMPTOMS
CHEST TIGHTNESS: 0
ABDOMINAL PAIN: 0
DIARRHEA: 0
SHORTNESS OF BREATH: 0
NAUSEA: 0
COLOR CHANGE: 0
ABDOMINAL DISTENTION: 0
BACK PAIN: 0
RHINORRHEA: 0
CONSTIPATION: 0
SORE THROAT: 0
COUGH: 0

## 2024-02-02 NOTE — PROGRESS NOTES
Bee Kulkarni, APRN-CNP  [facility]  [address], [address]  [facility] [address]  Dept: [phone]  Dept Fax: [phone]     Patient ID: Renetta Canas is a 63 y.o. female Established patient    HPI    Pt here today for f/u on chronic medical problems, go over labs and/or diagnostic studies, and medication refills. Pt denies any fever or chills.  Pt today denies any HA, chest pain, or SOB.  Pt denies any N/V/D/C or abdominal pain.    - overall she is feeling much better, she has more energy and has lost 20 lbs. She was in the hospital once due to running out of medications so she wasn't taking anything and felt horrible.     Otherwise pt doing well on current tx and no other concerns today.     The patient's past medical, surgical, social, and family history as well as his current medications and allergies were reviewed as documented in today's encounter DEDE Durham.      Previous office notes, labs, imaging and hospital records were reviewed prior to and during encounter.    Current Outpatient Medications on File Prior to Visit   Medication Sig Dispense Refill    citalopram (CELEXA) 10 MG tablet Take 1 tablet by mouth daily 30 tablet 0    lisinopril-hydroCHLOROthiazide (PRINZIDE;ZESTORETIC) 10-12.5 MG per tablet Take 1 tablet by mouth daily 90 tablet 1    apixaban (ELIQUIS) 5 MG TABS tablet Take 1 tablet by mouth 2 times daily 180 tablet 1    pyridoxine (B-6) 50 MG tablet Take 1 tablet by mouth daily 30 tablet 3    aspirin 81 MG EC tablet Take 1 tablet by mouth daily       No current facility-administered medications on file prior to visit.        Subjective:     Review of Systems   Constitutional:  Negative for activity change, fatigue and fever.   HENT:  Negative for congestion, ear pain, rhinorrhea and sore throat.    Respiratory:  Negative for cough, chest tightness and shortness of breath.    Cardiovascular:  Negative for chest pain and palpitations.

## 2024-02-02 NOTE — PATIENT INSTRUCTIONS
- 1 month lab work for potassium and magnesium    - potassium increase to 20 meq daily    - magnesium take 2 tablets daily   - b12 take 2 tablets M/W/F and 1 tablet every other day.     - call Central scheduling number: [phone] to schedule mammogram.    - 6 month follow up visit with labs.

## 2024-02-03 LAB — PYRIDOXAL PHOS SERPL-SCNC: 10.5 NMOL/L (ref 20–125)

## 2024-02-13 ENCOUNTER — TELEPHONE (OUTPATIENT)
Dept: OBGYN CLINIC | Age: 64
End: 2024-02-13

## 2024-02-19 RX ORDER — CITALOPRAM HYDROBROMIDE 10 MG/1
10 TABLET ORAL DAILY
Qty: 90 TABLET | Refills: 1 | Status: SHIPPED | OUTPATIENT
Start: 2024-02-19

## 2024-02-22 ENCOUNTER — TELEPHONE (OUTPATIENT)
Dept: OBGYN CLINIC | Age: 64
End: 2024-02-22

## 2024-03-08 ENCOUNTER — TELEPHONE (OUTPATIENT)
Dept: OBGYN CLINIC | Age: 64
End: 2024-03-08

## 2024-03-11 DIAGNOSIS — I26.99 BILATERAL PULMONARY EMBOLISM (HCC): ICD-10-CM

## 2024-03-11 DIAGNOSIS — I82.452 ACUTE DEEP VEIN THROMBOSIS (DVT) OF LEFT PERONEAL VEIN (HCC): ICD-10-CM

## 2024-05-04 DIAGNOSIS — I10 PRIMARY HYPERTENSION: ICD-10-CM

## 2024-05-06 RX ORDER — LISINOPRIL AND HYDROCHLOROTHIAZIDE 12.5; 1 MG/1; MG/1
1 TABLET ORAL DAILY
Qty: 90 TABLET | Refills: 1 | Status: SHIPPED | OUTPATIENT
Start: 2024-05-06

## 2024-05-28 RX ORDER — CITALOPRAM HYDROBROMIDE 10 MG/1
10 TABLET ORAL DAILY
Qty: 30 TABLET | Refills: 5 | Status: SHIPPED | OUTPATIENT
Start: 2024-05-28

## 2024-07-28 DIAGNOSIS — I10 PRIMARY HYPERTENSION: ICD-10-CM

## 2024-07-29 RX ORDER — LISINOPRIL AND HYDROCHLOROTHIAZIDE 12.5; 1 MG/1; MG/1
1 TABLET ORAL DAILY
Qty: 30 TABLET | Refills: 1 | Status: SHIPPED | OUTPATIENT
Start: 2024-07-29

## 2024-08-08 ENCOUNTER — OFFICE VISIT (OUTPATIENT)
Dept: ONCOLOGY | Age: 64
End: 2024-08-08
Payer: COMMERCIAL

## 2024-08-08 ENCOUNTER — TELEPHONE (OUTPATIENT)
Dept: ONCOLOGY | Age: 64
End: 2024-08-08

## 2024-08-08 VITALS
TEMPERATURE: 97.1 F | HEART RATE: 77 BPM | RESPIRATION RATE: 18 BRPM | WEIGHT: 236.9 LBS | OXYGEN SATURATION: 97 % | BODY MASS INDEX: 40.66 KG/M2 | DIASTOLIC BLOOD PRESSURE: 78 MMHG | SYSTOLIC BLOOD PRESSURE: 119 MMHG

## 2024-08-08 DIAGNOSIS — E53.8 B12 DEFICIENCY: ICD-10-CM

## 2024-08-08 DIAGNOSIS — E72.11 HYPERHOMOCYSTEINEMIA (HCC): ICD-10-CM

## 2024-08-08 DIAGNOSIS — I82.90 DEEP VEIN THROMBOSIS (DVT) OF NON-EXTREMITY VEIN, UNSPECIFIED CHRONICITY: Primary | ICD-10-CM

## 2024-08-08 PROCEDURE — 99211 OFF/OP EST MAY X REQ PHY/QHP: CPT | Performed by: INTERNAL MEDICINE

## 2024-08-08 NOTE — TELEPHONE ENCOUNTER
Instructions   from Dr. Darby Booker MD    Rv in 3 months, need labs in 1 week       RV 11/14/24 at 1 pm with labs to be done a week before

## 2024-08-08 NOTE — PROGRESS NOTES
% 87   PROTEIN S ACTIVITY 59 - 130 % 95   Protein S Ag, Total 63 - 126 % 125   (L): Data is abnormally low   Latest Reference Range & Units 1/3/23 07:47   FOLATE, FOLAT >4.8 ng/mL 4.2 (L)   Vitamin B-12 232 - 1245 pg/mL 179 (L)   (L): Data is abnormally low  Lab Results   Component Value Date    WBC 9.3 12/21/2023    HGB 12.7 12/21/2023    HCT 35.4 (L) 12/21/2023    MCV 95.5 12/21/2023     12/21/2023     Lab Results   Component Value Date    [id number] 1353 (H) 01/31/2024      Latest Reference Range & Units 11/30/22 07:54 03/28/23 07:42 01/31/24 10:02   Homocysteine <15.0 umol/L 64.9 (H) 59.0 (H) 21.7 (H)   (H): Data is abnormally high    REVIEW OF RADIOLOGICAL RESULTS:     IMPRESSION:   Vitamin B12 deficient  Elevated homocysteine secondary to B12 deficiency  Low Antithrombin III, but testing happened on the c occasion of the acute thrombosis  PLAN:   I had a very long discussion with the patient.  B12 levels are improving and her homocystine level is coming down nicely.  It is still borderline elevated but it is improving definitely.  Please just check on her B6 level was low.  We talked about taking vitamin B supplements and she will do so over-the-counter.  We will recheck her Antithrombin level  If the Antithrombin level returns to normal, then I think it would be safe to take her off anticoagulation  Patient agrees.  Will recheck in 3 months.  I think with B6 supplements and continued B12, that her homocystine level will improve

## 2024-09-10 ENCOUNTER — HOSPITAL ENCOUNTER (OUTPATIENT)
Age: 64
Setting detail: SPECIMEN
Discharge: HOME | End: 2024-09-10

## 2024-09-10 DIAGNOSIS — R73.01 ELEVATED FASTING GLUCOSE: ICD-10-CM

## 2024-09-10 DIAGNOSIS — R79.89 ELEVATED PARATHYROID HORMONE: ICD-10-CM

## 2024-09-10 DIAGNOSIS — I10 PRIMARY HYPERTENSION: ICD-10-CM

## 2024-09-10 DIAGNOSIS — D68.59 ANTITHROMBIN III DEFICIENCY (HCC): ICD-10-CM

## 2024-09-10 DIAGNOSIS — E55.9 VITAMIN D DEFICIENCY: ICD-10-CM

## 2024-09-10 DIAGNOSIS — E72.11 HYPERHOMOCYSTEINEMIA (HCC): ICD-10-CM

## 2024-09-10 DIAGNOSIS — E53.8 B12 DEFICIENCY: ICD-10-CM

## 2024-09-10 DIAGNOSIS — E87.6 HYPOKALEMIA: ICD-10-CM

## 2024-09-10 DIAGNOSIS — E83.51 HYPOCALCEMIA: ICD-10-CM

## 2024-09-10 DIAGNOSIS — E78.2 MIXED HYPERLIPIDEMIA: ICD-10-CM

## 2024-09-10 DIAGNOSIS — E83.42 HYPOMAGNESEMIA: ICD-10-CM

## 2024-09-10 LAB
25(OH)D3 SERPL-MCNC: 35 NG/ML (ref 30–100)
ALBUMIN SERPL-MCNC: 4.1 G/DL (ref 3.5–5.2)
ALBUMIN/GLOB SERPL: 1 {RATIO} (ref 1–2.5)
ALP SERPL-CCNC: 82 U/L (ref 35–104)
ALT SERPL-CCNC: 17 U/L (ref 10–35)
ANION GAP SERPL CALCULATED.3IONS-SCNC: 10 MMOL/L (ref 9–16)
AST SERPL-CCNC: 23 U/L (ref 10–35)
BASOPHILS # BLD: 0.08 K/UL (ref 0–0.2)
BASOPHILS NFR BLD: 1 % (ref 0–2)
BILIRUB SERPL-MCNC: 0.8 MG/DL (ref 0–1.2)
BUN SERPL-MCNC: 20 MG/DL (ref 8–23)
CA-I BLD-SCNC: 1.33 MMOL/L (ref 1.13–1.33)
CALCIUM SERPL-MCNC: 9.4 MG/DL (ref 8.6–10.4)
CHLORIDE SERPL-SCNC: 102 MMOL/L (ref 98–107)
CHOLEST SERPL-MCNC: 222 MG/DL (ref 0–199)
CHOLESTEROL/HDL RATIO: 5
CO2 SERPL-SCNC: 26 MMOL/L (ref 20–31)
CREAT SERPL-MCNC: 1.2 MG/DL (ref 0.5–0.9)
EOSINOPHIL # BLD: 0.58 K/UL (ref 0–0.44)
EOSINOPHILS RELATIVE PERCENT: 6 % (ref 1–4)
ERYTHROCYTE [DISTWIDTH] IN BLOOD BY AUTOMATED COUNT: 12.6 % (ref 11.8–14.4)
EST. AVERAGE GLUCOSE BLD GHB EST-MCNC: 103 MG/DL
FOLATE SERPL-MCNC: 4.5 NG/ML (ref 4.8–24.2)
GFR, ESTIMATED: 49 ML/MIN/1.73M2
GLUCOSE SERPL-MCNC: 100 MG/DL (ref 74–99)
HBA1C MFR BLD: 5.2 % (ref 4–6)
HCT VFR BLD AUTO: 39 % (ref 36.3–47.1)
HCYS SERPL-SCNC: 15.6 UMOL/L (ref 0–15)
HDLC SERPL-MCNC: 44 MG/DL
HGB BLD-MCNC: 12.7 G/DL (ref 11.9–15.1)
IMM GRANULOCYTES # BLD AUTO: <0.03 K/UL (ref 0–0.3)
IMM GRANULOCYTES NFR BLD: 0 %
LDLC SERPL CALC-MCNC: 156 MG/DL (ref 0–100)
LYMPHOCYTES NFR BLD: 3 K/UL (ref 1.1–3.7)
LYMPHOCYTES RELATIVE PERCENT: 32 % (ref 24–43)
MAGNESIUM SERPL-MCNC: 1.7 MG/DL (ref 1.6–2.4)
MCH RBC QN AUTO: 28.7 PG (ref 25.2–33.5)
MCHC RBC AUTO-ENTMCNC: 32.6 G/DL (ref 28.4–34.8)
MCV RBC AUTO: 88 FL (ref 82.6–102.9)
MONOCYTES NFR BLD: 0.76 K/UL (ref 0.1–1.2)
MONOCYTES NFR BLD: 8 % (ref 3–12)
NEUTROPHILS NFR BLD: 53 % (ref 36–65)
NEUTS SEG NFR BLD: 4.97 K/UL (ref 1.5–8.1)
NRBC BLD-RTO: 0 PER 100 WBC
PLATELET # BLD AUTO: 407 K/UL (ref 138–453)
PMV BLD AUTO: 10.5 FL (ref 8.1–13.5)
POTASSIUM SERPL-SCNC: 4.7 MMOL/L (ref 3.7–5.3)
PROT SERPL-MCNC: 7.3 G/DL (ref 6.6–8.7)
PTH-INTACT SERPL-MCNC: 63 PG/ML (ref 15–65)
RBC # BLD AUTO: 4.43 M/UL (ref 3.95–5.11)
SODIUM SERPL-SCNC: 138 MMOL/L (ref 136–145)
TRIGL SERPL-MCNC: 112 MG/DL
VIT B12 SERPL-MCNC: 568 PG/ML (ref 232–1245)
VLDLC SERPL CALC-MCNC: 22 MG/DL
WBC OTHER # BLD: 9.4 K/UL (ref 3.5–11.3)

## 2024-09-10 SDOH — ECONOMIC STABILITY: FOOD INSECURITY: WITHIN THE PAST 12 MONTHS, THE FOOD YOU BOUGHT JUST DIDN'T LAST AND YOU DIDN'T HAVE MONEY TO GET MORE.: NEVER TRUE

## 2024-09-10 SDOH — ECONOMIC STABILITY: FOOD INSECURITY: WITHIN THE PAST 12 MONTHS, YOU WORRIED THAT YOUR FOOD WOULD RUN OUT BEFORE YOU GOT MONEY TO BUY MORE.: NEVER TRUE

## 2024-09-10 SDOH — ECONOMIC STABILITY: INCOME INSECURITY: HOW HARD IS IT FOR YOU TO PAY FOR THE VERY BASICS LIKE FOOD, HOUSING, MEDICAL CARE, AND HEATING?: NOT HARD AT ALL

## 2024-09-12 ASSESSMENT — ENCOUNTER SYMPTOMS
NAUSEA: 0
SHORTNESS OF BREATH: 0
DIARRHEA: 0
CONSTIPATION: 0
CHEST TIGHTNESS: 0
COLOR CHANGE: 0
ABDOMINAL PAIN: 0
SORE THROAT: 0
RHINORRHEA: 0
COUGH: 0
ABDOMINAL DISTENTION: 0
BACK PAIN: 0

## 2024-09-13 ENCOUNTER — OFFICE VISIT (OUTPATIENT)
Dept: FAMILY MEDICINE CLINIC | Age: 64
End: 2024-09-13

## 2024-09-13 VITALS
BODY MASS INDEX: 40.68 KG/M2 | TEMPERATURE: 97.2 F | SYSTOLIC BLOOD PRESSURE: 116 MMHG | WEIGHT: 237 LBS | OXYGEN SATURATION: 98 % | DIASTOLIC BLOOD PRESSURE: 74 MMHG | HEART RATE: 74 BPM | RESPIRATION RATE: 16 BRPM

## 2024-09-13 DIAGNOSIS — Z86.59 HISTORY OF DEPRESSION: ICD-10-CM

## 2024-09-13 DIAGNOSIS — E72.11 HYPERHOMOCYSTEINEMIA (HCC): ICD-10-CM

## 2024-09-13 DIAGNOSIS — E87.6 HYPOKALEMIA: ICD-10-CM

## 2024-09-13 DIAGNOSIS — D68.59 ANTITHROMBIN III DEFICIENCY (HCC): ICD-10-CM

## 2024-09-13 DIAGNOSIS — I82.552 CHRONIC DEEP VEIN THROMBOSIS (DVT) OF LEFT PERONEAL VEIN (HCC): ICD-10-CM

## 2024-09-13 DIAGNOSIS — R79.89 ELEVATED PARATHYROID HORMONE: ICD-10-CM

## 2024-09-13 DIAGNOSIS — E78.2 MIXED HYPERLIPIDEMIA: ICD-10-CM

## 2024-09-13 DIAGNOSIS — E53.1 VITAMIN B6 DEFICIENCY: ICD-10-CM

## 2024-09-13 DIAGNOSIS — E83.51 HYPOCALCEMIA: ICD-10-CM

## 2024-09-13 DIAGNOSIS — E83.42 HYPOMAGNESEMIA: ICD-10-CM

## 2024-09-13 DIAGNOSIS — E53.8 B12 DEFICIENCY: ICD-10-CM

## 2024-09-13 DIAGNOSIS — R79.89 ELEVATED SERUM CREATININE: ICD-10-CM

## 2024-09-13 DIAGNOSIS — I26.99 BILATERAL PULMONARY EMBOLISM (HCC): Primary | ICD-10-CM

## 2024-09-13 DIAGNOSIS — E55.9 VITAMIN D DEFICIENCY: ICD-10-CM

## 2024-09-13 DIAGNOSIS — I10 PRIMARY HYPERTENSION: ICD-10-CM

## 2024-09-13 PROBLEM — R11.0 NAUSEA: Status: RESOLVED | Noted: 2023-12-21 | Resolved: 2024-09-13

## 2024-09-13 PROBLEM — H81.11 BENIGN PAROXYSMAL POSITIONAL VERTIGO OF RIGHT EAR: Status: RESOLVED | Noted: 2022-11-18 | Resolved: 2024-09-13

## 2024-09-13 PROBLEM — R42 DIZZINESS: Status: RESOLVED | Noted: 2023-12-21 | Resolved: 2024-09-13

## 2024-09-13 PROBLEM — R74.01 TRANSAMINITIS: Status: RESOLVED | Noted: 2023-12-21 | Resolved: 2024-09-13

## 2024-09-16 LAB — AT III ACT/NOR PPP CHRO: 106 % (ref 83–122)

## 2024-10-06 DIAGNOSIS — I10 PRIMARY HYPERTENSION: ICD-10-CM

## 2024-10-07 RX ORDER — LISINOPRIL/HYDROCHLOROTHIAZIDE 10-12.5 MG
1 TABLET ORAL DAILY
Qty: 90 TABLET | Refills: 1 | Status: SHIPPED | OUTPATIENT
Start: 2024-10-07 | End: 2024-10-08 | Stop reason: SDUPTHER

## 2024-10-08 ENCOUNTER — PATIENT MESSAGE (OUTPATIENT)
Dept: FAMILY MEDICINE CLINIC | Age: 64
End: 2024-10-08

## 2024-10-08 DIAGNOSIS — I10 PRIMARY HYPERTENSION: ICD-10-CM

## 2024-10-08 RX ORDER — LISINOPRIL/HYDROCHLOROTHIAZIDE 10-12.5 MG
1 TABLET ORAL DAILY
Qty: 30 TABLET | Refills: 1 | Status: SHIPPED | OUTPATIENT
Start: 2024-10-08

## 2024-12-27 RX ORDER — CITALOPRAM HYDROBROMIDE 10 MG/1
10 TABLET ORAL DAILY
Qty: 30 TABLET | Refills: 5 | Status: SHIPPED | OUTPATIENT
Start: 2024-12-27

## 2025-04-15 DIAGNOSIS — I10 PRIMARY HYPERTENSION: ICD-10-CM

## 2025-04-15 RX ORDER — LISINOPRIL AND HYDROCHLOROTHIAZIDE 10; 12.5 MG/1; MG/1
1 TABLET ORAL DAILY
Qty: 30 TABLET | Refills: 1 | Status: SHIPPED | OUTPATIENT
Start: 2025-04-15

## 2025-04-30 DIAGNOSIS — E53.8 B12 DEFICIENCY: ICD-10-CM

## 2025-04-30 DIAGNOSIS — E53.1 VITAMIN B6 DEFICIENCY: ICD-10-CM

## 2025-04-30 DIAGNOSIS — E78.2 MIXED HYPERLIPIDEMIA: ICD-10-CM

## 2025-04-30 LAB
CHOLESTEROL, TOTAL: 232 MG/DL (ref 0–199)
CHOLESTEROL/HDL RATIO: 5.5
FOLATE: 21.7 NG/ML (ref 4.8–24.2)
HDLC SERPL-MCNC: 42 MG/DL
LDL CHOLESTEROL: 164 MG/DL (ref 0–100)
TRIGL SERPL-MCNC: 131 MG/DL
VITAMIN B-12: 1216 PG/ML (ref 232–1245)
VLDLC SERPL CALC-MCNC: 26 MG/DL (ref 1–30)

## 2025-05-01 ENCOUNTER — RESULTS FOLLOW-UP (OUTPATIENT)
Dept: FAMILY MEDICINE CLINIC | Age: 65
End: 2025-05-01

## 2025-05-01 ASSESSMENT — PATIENT HEALTH QUESTIONNAIRE - PHQ9
1. LITTLE INTEREST OR PLEASURE IN DOING THINGS: NOT AT ALL
2. FEELING DOWN, DEPRESSED OR HOPELESS: NOT AT ALL
SUM OF ALL RESPONSES TO PHQ9 QUESTIONS 1 & 2: 0
1. LITTLE INTEREST OR PLEASURE IN DOING THINGS: NOT AT ALL
SUM OF ALL RESPONSES TO PHQ QUESTIONS 1-9: 0
2. FEELING DOWN, DEPRESSED OR HOPELESS: NOT AT ALL

## 2025-05-02 SDOH — ECONOMIC STABILITY: FOOD INSECURITY: WITHIN THE PAST 12 MONTHS, THE FOOD YOU BOUGHT JUST DIDN'T LAST AND YOU DIDN'T HAVE MONEY TO GET MORE.: NEVER TRUE

## 2025-05-02 SDOH — ECONOMIC STABILITY: FOOD INSECURITY: WITHIN THE PAST 12 MONTHS, YOU WORRIED THAT YOUR FOOD WOULD RUN OUT BEFORE YOU GOT MONEY TO BUY MORE.: NEVER TRUE

## 2025-05-02 SDOH — ECONOMIC STABILITY: INCOME INSECURITY: IN THE LAST 12 MONTHS, WAS THERE A TIME WHEN YOU WERE NOT ABLE TO PAY THE MORTGAGE OR RENT ON TIME?: NO

## 2025-05-02 SDOH — ECONOMIC STABILITY: TRANSPORTATION INSECURITY
IN THE PAST 12 MONTHS, HAS THE LACK OF TRANSPORTATION KEPT YOU FROM MEDICAL APPOINTMENTS OR FROM GETTING MEDICATIONS?: NO

## 2025-05-04 LAB — VITAMIN B6: 195.2 NMOL/L (ref 20–125)

## 2025-05-05 ENCOUNTER — OFFICE VISIT (OUTPATIENT)
Dept: FAMILY MEDICINE CLINIC | Age: 65
End: 2025-05-05
Payer: COMMERCIAL

## 2025-05-05 VITALS
TEMPERATURE: 98.1 F | DIASTOLIC BLOOD PRESSURE: 84 MMHG | HEIGHT: 64 IN | RESPIRATION RATE: 16 BRPM | WEIGHT: 256 LBS | BODY MASS INDEX: 43.71 KG/M2 | HEART RATE: 95 BPM | OXYGEN SATURATION: 94 % | SYSTOLIC BLOOD PRESSURE: 138 MMHG

## 2025-05-05 DIAGNOSIS — E83.51 HYPOCALCEMIA: ICD-10-CM

## 2025-05-05 DIAGNOSIS — I10 PRIMARY HYPERTENSION: Primary | ICD-10-CM

## 2025-05-05 DIAGNOSIS — E78.2 MIXED HYPERLIPIDEMIA: ICD-10-CM

## 2025-05-05 DIAGNOSIS — E72.11 HYPERHOMOCYSTEINEMIA: ICD-10-CM

## 2025-05-05 DIAGNOSIS — E55.9 VITAMIN D DEFICIENCY: ICD-10-CM

## 2025-05-05 DIAGNOSIS — E53.1 VITAMIN B6 DEFICIENCY: ICD-10-CM

## 2025-05-05 DIAGNOSIS — D68.59 ANTITHROMBIN III DEFICIENCY: ICD-10-CM

## 2025-05-05 DIAGNOSIS — E53.8 B12 DEFICIENCY: ICD-10-CM

## 2025-05-05 DIAGNOSIS — Z86.718 HX OF DEEP VENOUS THROMBOSIS: ICD-10-CM

## 2025-05-05 DIAGNOSIS — Z12.31 ENCOUNTER FOR SCREENING MAMMOGRAM FOR MALIGNANT NEOPLASM OF BREAST: ICD-10-CM

## 2025-05-05 DIAGNOSIS — Z86.59 HISTORY OF DEPRESSION: ICD-10-CM

## 2025-05-05 DIAGNOSIS — Z86.711 HX PULMONARY EMBOLISM: ICD-10-CM

## 2025-05-05 DIAGNOSIS — R79.89 ELEVATED PARATHYROID HORMONE: ICD-10-CM

## 2025-05-05 DIAGNOSIS — E83.42 HYPOMAGNESEMIA: ICD-10-CM

## 2025-05-05 DIAGNOSIS — E87.6 HYPOKALEMIA: ICD-10-CM

## 2025-05-05 DIAGNOSIS — R73.9 ELEVATED BLOOD SUGAR: ICD-10-CM

## 2025-05-05 DIAGNOSIS — R79.89 ELEVATED SERUM CREATININE: ICD-10-CM

## 2025-05-05 PROBLEM — I82.409 DVT (DEEP VENOUS THROMBOSIS) (HCC): Status: RESOLVED | Noted: 2022-04-25 | Resolved: 2025-05-05

## 2025-05-05 PROCEDURE — 99214 OFFICE O/P EST MOD 30 MIN: CPT | Performed by: NURSE PRACTITIONER

## 2025-05-05 PROCEDURE — 3079F DIAST BP 80-89 MM HG: CPT | Performed by: NURSE PRACTITIONER

## 2025-05-05 PROCEDURE — 3075F SYST BP GE 130 - 139MM HG: CPT | Performed by: NURSE PRACTITIONER

## 2025-05-05 RX ORDER — PYRIDOXINE HCL (VITAMIN B6) 50 MG
TABLET ORAL
Qty: 90 TABLET | Refills: 1 | Status: SHIPPED | OUTPATIENT
Start: 2025-05-05

## 2025-05-05 ASSESSMENT — ENCOUNTER SYMPTOMS
DIARRHEA: 0
RHINORRHEA: 0
CHEST TIGHTNESS: 0
SHORTNESS OF BREATH: 0
SORE THROAT: 0
ABDOMINAL PAIN: 0
BACK PAIN: 0
CONSTIPATION: 0
COUGH: 0
NAUSEA: 0
COLOR CHANGE: 0

## 2025-05-05 NOTE — PROGRESS NOTES
CONCHIS Henriquez-CNP  [facility] MEDICINE  [address], [address]  [facility] [address]  Dept: [phone]  Dept Fax: [phone]     Patient ID: Renetta Canas is a 65 y.o. female Established patient    HPI    History of Present Illness  The patient presents for a follow-up visit.    She has been experiencing weight gain, which she attributes to stress eating and inadequate hydration. Her goal is to lose 20 pounds by September 2025. She reports a daily bowel movement and expresses a desire to reduce her abdominal girth.    Recent lab results show elevated cholesterol levels and high vitamin B6 levels. She has been supplementing with vitamin B6 once daily and vitamin B12 twice weekly on Mondays, Wednesdays, and Fridays, with a single dose on the remaining days. She reports feeling well overall, with a sleep duration ranging from 6 to 10.5 hours per night.    She is due for a mammogram, which is scheduled for 05/16/2025, and acknowledges the need to reschedule her Pap smear.    She had a severe cold this summer, which did not require nebulizer treatment. She typically experiences a significant cold around February each year, resulting in temporary voice loss. During these episodes, she utilizes a humidifier and remains in her pajamas.    Otherwise pt doing well on current tx and no other concerns today.     The patient's past medical, surgical, social, and family history as well as his current medications and allergies were reviewed as documented in today's encounter [facility].      Previous office notes, labs, imaging and hospital records were reviewed prior to and during encounter.    Current Outpatient Medications on File Prior to Visit   Medication Sig Dispense Refill    lisinopril-hydroCHLOROthiazide (PRINZIDE;ZESTORETIC) 10-12.5 MG per tablet Take 1 tablet by mouth daily 30 tablet 1    citalopram (CELEXA) 10 MG tablet Take 1 tablet by mouth daily 30 tablet 5

## 2025-06-15 DIAGNOSIS — I10 PRIMARY HYPERTENSION: ICD-10-CM

## 2025-06-16 RX ORDER — LISINOPRIL AND HYDROCHLOROTHIAZIDE 10; 12.5 MG/1; MG/1
1 TABLET ORAL DAILY
Qty: 90 TABLET | Refills: 1 | Status: SHIPPED | OUTPATIENT
Start: 2025-06-16 | End: 2025-06-17 | Stop reason: SDUPTHER

## 2025-06-17 DIAGNOSIS — I10 PRIMARY HYPERTENSION: ICD-10-CM

## 2025-06-17 RX ORDER — LISINOPRIL AND HYDROCHLOROTHIAZIDE 10; 12.5 MG/1; MG/1
1 TABLET ORAL DAILY
Qty: 30 TABLET | Refills: 3 | Status: SHIPPED | OUTPATIENT
Start: 2025-06-17

## 2025-06-30 RX ORDER — CITALOPRAM HYDROBROMIDE 10 MG/1
10 TABLET ORAL DAILY
Qty: 90 TABLET | Refills: 1 | Status: SHIPPED | OUTPATIENT
Start: 2025-06-30